# Patient Record
Sex: MALE | Race: WHITE | Employment: OTHER | URBAN - METROPOLITAN AREA
[De-identification: names, ages, dates, MRNs, and addresses within clinical notes are randomized per-mention and may not be internally consistent; named-entity substitution may affect disease eponyms.]

---

## 2013-01-01 LAB — COLONOSCOPY, EXTERNAL: NORMAL

## 2017-03-14 DIAGNOSIS — M25.532 WRIST PAIN, ACUTE, LEFT: ICD-10-CM

## 2017-03-14 DIAGNOSIS — R05.9 COUGH: ICD-10-CM

## 2017-11-01 PROBLEM — E78.1 HYPERTRIGLYCERIDEMIA: Status: ACTIVE | Noted: 2017-11-01

## 2017-11-01 PROBLEM — J33.9 NASAL POLYP: Status: ACTIVE | Noted: 2017-11-01

## 2017-11-01 PROBLEM — J30.9 ALLERGIC RHINITIS: Status: ACTIVE | Noted: 2017-11-01

## 2017-11-01 PROBLEM — G47.00 INSOMNIA: Status: ACTIVE | Noted: 2017-11-01

## 2017-11-01 PROBLEM — R21 RASH: Status: ACTIVE | Noted: 2017-11-01

## 2017-11-01 PROBLEM — M10.9 ACUTE GOUT OF RIGHT FOOT: Status: ACTIVE | Noted: 2017-11-01

## 2017-11-01 PROBLEM — Z12.5 PROSTATE CANCER SCREENING: Status: ACTIVE | Noted: 2017-11-01

## 2017-11-01 PROBLEM — I10 HTN (HYPERTENSION): Status: ACTIVE | Noted: 2017-11-01

## 2017-11-01 PROBLEM — R53.83 FATIGUE: Status: ACTIVE | Noted: 2017-11-01

## 2017-11-01 PROBLEM — M54.9 BACK PAIN: Status: ACTIVE | Noted: 2017-11-01

## 2017-11-01 PROBLEM — M25.532 WRIST PAIN, ACUTE, LEFT: Status: ACTIVE | Noted: 2017-11-01

## 2017-11-01 PROBLEM — R07.9 CHEST PAIN: Status: ACTIVE | Noted: 2017-11-01

## 2017-11-01 PROBLEM — M19.90 ARTHRITIS: Status: ACTIVE | Noted: 2017-11-01

## 2017-11-01 PROBLEM — M25.50 ARTHRALGIA: Status: ACTIVE | Noted: 2017-11-01

## 2017-11-01 PROBLEM — R05.9 COUGH: Status: ACTIVE | Noted: 2017-11-01

## 2017-11-01 RX ORDER — LEVOFLOXACIN 750 MG/1
750 TABLET ORAL DAILY
COMMUNITY
End: 2017-11-27

## 2017-11-27 ENCOUNTER — HOSPITAL ENCOUNTER (OUTPATIENT)
Age: 82
Setting detail: OBSERVATION
Discharge: HOME | End: 2017-11-29
Attending: EMERGENCY MEDICINE | Admitting: INTERNAL MEDICINE
Payer: MEDICARE

## 2017-11-27 ENCOUNTER — APPOINTMENT (OUTPATIENT)
Dept: GENERAL RADIOLOGY | Age: 82
End: 2017-11-27
Attending: EMERGENCY MEDICINE
Payer: MEDICARE

## 2017-11-27 ENCOUNTER — APPOINTMENT (OUTPATIENT)
Dept: CT IMAGING | Age: 82
End: 2017-11-27
Attending: EMERGENCY MEDICINE
Payer: MEDICARE

## 2017-11-27 DIAGNOSIS — R47.1 DYSARTHRIA: Primary | ICD-10-CM

## 2017-11-27 DIAGNOSIS — G45.1 HEMISPHERIC CAROTID ARTERY SYNDROME: ICD-10-CM

## 2017-11-27 DIAGNOSIS — E78.5 HYPERLIPIDEMIA, UNSPECIFIED HYPERLIPIDEMIA TYPE: ICD-10-CM

## 2017-11-27 DIAGNOSIS — G45.9 TRANSIENT CEREBRAL ISCHEMIA, UNSPECIFIED TYPE: ICD-10-CM

## 2017-11-27 DIAGNOSIS — I10 HYPERTENSION, UNSPECIFIED TYPE: ICD-10-CM

## 2017-11-27 DIAGNOSIS — I65.23 BILATERAL CAROTID ARTERY STENOSIS: ICD-10-CM

## 2017-11-27 DIAGNOSIS — I69.322 DYSARTHRIA DUE TO RECENT CEREBROVASCULAR ACCIDENT (CVA): ICD-10-CM

## 2017-11-27 LAB
ALBUMIN SERPL-MCNC: 3.7 G/DL (ref 3.5–5)
ALBUMIN/GLOB SERPL: 0.9 {RATIO} (ref 1.1–2.2)
ALP SERPL-CCNC: 84 U/L (ref 45–117)
ALT SERPL-CCNC: 27 U/L (ref 12–78)
ANION GAP SERPL CALC-SCNC: 6 MMOL/L (ref 5–15)
APPEARANCE UR: CLEAR
AST SERPL-CCNC: 17 U/L (ref 15–37)
ATRIAL RATE: 63 BPM
BACTERIA URNS QL MICRO: NEGATIVE /HPF
BASOPHILS # BLD: 0 K/UL (ref 0–0.1)
BASOPHILS NFR BLD: 1 % (ref 0–1)
BILIRUB SERPL-MCNC: 0.6 MG/DL (ref 0.2–1)
BILIRUB UR QL: NEGATIVE
BUN SERPL-MCNC: 19 MG/DL (ref 6–20)
BUN/CREAT SERPL: 16 (ref 12–20)
CALCIUM SERPL-MCNC: 8.9 MG/DL (ref 8.5–10.1)
CALCULATED P AXIS, ECG09: 28 DEGREES
CALCULATED R AXIS, ECG10: -15 DEGREES
CALCULATED T AXIS, ECG11: 130 DEGREES
CHLORIDE SERPL-SCNC: 102 MMOL/L (ref 97–108)
CK MB CFR SERPL CALC: 1.9 % (ref 0–2.5)
CK MB SERPL-MCNC: 2.2 NG/ML (ref 5–25)
CK SERPL-CCNC: 116 U/L (ref 39–308)
CO2 SERPL-SCNC: 29 MMOL/L (ref 21–32)
COLOR UR: NORMAL
CREAT SERPL-MCNC: 1.17 MG/DL (ref 0.7–1.3)
DIAGNOSIS, 93000: NORMAL
EOSINOPHIL # BLD: 0.3 K/UL (ref 0–0.4)
EOSINOPHIL NFR BLD: 5 % (ref 0–7)
EPITH CASTS URNS QL MICRO: NORMAL /LPF
ERYTHROCYTE [DISTWIDTH] IN BLOOD BY AUTOMATED COUNT: 13.2 % (ref 11.5–14.5)
GLOBULIN SER CALC-MCNC: 4 G/DL (ref 2–4)
GLUCOSE SERPL-MCNC: 106 MG/DL (ref 65–100)
GLUCOSE UR STRIP.AUTO-MCNC: NEGATIVE MG/DL
HCT VFR BLD AUTO: 44 % (ref 36.6–50.3)
HGB BLD-MCNC: 15.3 G/DL (ref 12.1–17)
HGB UR QL STRIP: NEGATIVE
HYALINE CASTS URNS QL MICRO: NORMAL /LPF (ref 0–5)
INR PPP: 1 (ref 0.9–1.1)
KETONES UR QL STRIP.AUTO: NEGATIVE MG/DL
LEUKOCYTE ESTERASE UR QL STRIP.AUTO: NEGATIVE
LYMPHOCYTES # BLD: 1.8 K/UL (ref 0.8–3.5)
LYMPHOCYTES NFR BLD: 29 % (ref 12–49)
MCH RBC QN AUTO: 30.8 PG (ref 26–34)
MCHC RBC AUTO-ENTMCNC: 34.8 G/DL (ref 30–36.5)
MCV RBC AUTO: 88.7 FL (ref 80–99)
MONOCYTES # BLD: 0.4 K/UL (ref 0–1)
MONOCYTES NFR BLD: 6 % (ref 5–13)
NEUTS SEG # BLD: 3.7 K/UL (ref 1.8–8)
NEUTS SEG NFR BLD: 59 % (ref 32–75)
NITRITE UR QL STRIP.AUTO: NEGATIVE
P-R INTERVAL, ECG05: 172 MS
PH UR STRIP: 6 [PH] (ref 5–8)
PLATELET # BLD AUTO: 242 K/UL (ref 150–400)
POTASSIUM SERPL-SCNC: 3.9 MMOL/L (ref 3.5–5.1)
PROT SERPL-MCNC: 7.7 G/DL (ref 6.4–8.2)
PROT UR STRIP-MCNC: NEGATIVE MG/DL
PROTHROMBIN TIME: 10.1 SEC (ref 9–11.1)
Q-T INTERVAL, ECG07: 470 MS
QRS DURATION, ECG06: 154 MS
QTC CALCULATION (BEZET), ECG08: 480 MS
RBC # BLD AUTO: 4.96 M/UL (ref 4.1–5.7)
RBC #/AREA URNS HPF: NORMAL /HPF (ref 0–5)
SODIUM SERPL-SCNC: 137 MMOL/L (ref 136–145)
SP GR UR REFRACTOMETRY: 1.01 (ref 1–1.03)
TROPONIN I SERPL-MCNC: <0.04 NG/ML
UROBILINOGEN UR QL STRIP.AUTO: 0.2 EU/DL (ref 0.2–1)
VENTRICULAR RATE, ECG03: 63 BPM
WBC # BLD AUTO: 6.2 K/UL (ref 4.1–11.1)
WBC URNS QL MICRO: NORMAL /HPF (ref 0–4)

## 2017-11-27 PROCEDURE — 99218 HC RM OBSERVATION: CPT

## 2017-11-27 PROCEDURE — 99285 EMERGENCY DEPT VISIT HI MDM: CPT

## 2017-11-27 PROCEDURE — 81001 URINALYSIS AUTO W/SCOPE: CPT | Performed by: EMERGENCY MEDICINE

## 2017-11-27 PROCEDURE — 93005 ELECTROCARDIOGRAM TRACING: CPT

## 2017-11-27 PROCEDURE — 82550 ASSAY OF CK (CPK): CPT | Performed by: EMERGENCY MEDICINE

## 2017-11-27 PROCEDURE — 96372 THER/PROPH/DIAG INJ SC/IM: CPT

## 2017-11-27 PROCEDURE — 80053 COMPREHEN METABOLIC PANEL: CPT | Performed by: EMERGENCY MEDICINE

## 2017-11-27 PROCEDURE — 71020 XR CHEST PA LAT: CPT

## 2017-11-27 PROCEDURE — 74011250637 HC RX REV CODE- 250/637: Performed by: EMERGENCY MEDICINE

## 2017-11-27 PROCEDURE — 70450 CT HEAD/BRAIN W/O DYE: CPT

## 2017-11-27 PROCEDURE — 85025 COMPLETE CBC W/AUTO DIFF WBC: CPT | Performed by: EMERGENCY MEDICINE

## 2017-11-27 PROCEDURE — 84484 ASSAY OF TROPONIN QUANT: CPT | Performed by: EMERGENCY MEDICINE

## 2017-11-27 PROCEDURE — 74011250636 HC RX REV CODE- 250/636: Performed by: INTERNAL MEDICINE

## 2017-11-27 PROCEDURE — 36415 COLL VENOUS BLD VENIPUNCTURE: CPT | Performed by: EMERGENCY MEDICINE

## 2017-11-27 PROCEDURE — 85610 PROTHROMBIN TIME: CPT | Performed by: EMERGENCY MEDICINE

## 2017-11-27 PROCEDURE — 74011250637 HC RX REV CODE- 250/637: Performed by: INTERNAL MEDICINE

## 2017-11-27 RX ORDER — DOCUSATE SODIUM 100 MG/1
100 CAPSULE, LIQUID FILLED ORAL
Status: DISCONTINUED | OUTPATIENT
Start: 2017-11-27 | End: 2017-11-29 | Stop reason: HOSPADM

## 2017-11-27 RX ORDER — SODIUM CHLORIDE 0.9 % (FLUSH) 0.9 %
5-10 SYRINGE (ML) INJECTION EVERY 8 HOURS
Status: DISCONTINUED | OUTPATIENT
Start: 2017-11-27 | End: 2017-11-29 | Stop reason: HOSPADM

## 2017-11-27 RX ORDER — DIPHENHYDRAMINE HCL 25 MG
25 CAPSULE ORAL
Status: DISCONTINUED | OUTPATIENT
Start: 2017-11-27 | End: 2017-11-29 | Stop reason: HOSPADM

## 2017-11-27 RX ORDER — DIPHENHYDRAMINE HCL 25 MG
25 CAPSULE ORAL
COMMUNITY
End: 2018-06-28

## 2017-11-27 RX ORDER — GUAIFENESIN 100 MG/5ML
243 LIQUID (ML) ORAL
Status: COMPLETED | OUTPATIENT
Start: 2017-11-27 | End: 2017-11-27

## 2017-11-27 RX ORDER — SODIUM CHLORIDE 0.9 % (FLUSH) 0.9 %
5-10 SYRINGE (ML) INJECTION AS NEEDED
Status: DISCONTINUED | OUTPATIENT
Start: 2017-11-27 | End: 2017-11-29 | Stop reason: HOSPADM

## 2017-11-27 RX ORDER — GUAIFENESIN 100 MG/5ML
81 LIQUID (ML) ORAL DAILY
Status: DISCONTINUED | OUTPATIENT
Start: 2017-11-28 | End: 2017-11-29 | Stop reason: HOSPADM

## 2017-11-27 RX ORDER — HYDROCHLOROTHIAZIDE 25 MG/1
25 TABLET ORAL DAILY
Status: DISCONTINUED | OUTPATIENT
Start: 2017-11-28 | End: 2017-11-29 | Stop reason: HOSPADM

## 2017-11-27 RX ORDER — ZOLPIDEM TARTRATE 5 MG/1
5 TABLET ORAL
Status: DISCONTINUED | OUTPATIENT
Start: 2017-11-27 | End: 2017-11-29 | Stop reason: HOSPADM

## 2017-11-27 RX ORDER — ACETAMINOPHEN 325 MG/1
650 TABLET ORAL
Status: DISCONTINUED | OUTPATIENT
Start: 2017-11-27 | End: 2017-11-29 | Stop reason: HOSPADM

## 2017-11-27 RX ORDER — HEPARIN SODIUM 5000 [USP'U]/ML
5000 INJECTION, SOLUTION INTRAVENOUS; SUBCUTANEOUS EVERY 8 HOURS
Status: DISCONTINUED | OUTPATIENT
Start: 2017-11-27 | End: 2017-11-29 | Stop reason: HOSPADM

## 2017-11-27 RX ORDER — ONDANSETRON 2 MG/ML
4 INJECTION INTRAMUSCULAR; INTRAVENOUS
Status: DISCONTINUED | OUTPATIENT
Start: 2017-11-27 | End: 2017-11-29 | Stop reason: HOSPADM

## 2017-11-27 RX ADMIN — ZOLPIDEM TARTRATE 5 MG: 5 TABLET ORAL at 22:15

## 2017-11-27 RX ADMIN — HEPARIN SODIUM 5000 UNITS: 5000 INJECTION, SOLUTION INTRAVENOUS; SUBCUTANEOUS at 22:14

## 2017-11-27 RX ADMIN — ASPIRIN 81 MG 243 MG: 81 TABLET ORAL at 12:08

## 2017-11-27 RX ADMIN — Medication 10 ML: at 22:20

## 2017-11-27 RX ADMIN — Medication 10 ML: at 12:08

## 2017-11-27 RX ADMIN — Medication 10 ML: at 17:00

## 2017-11-27 NOTE — ED PROVIDER NOTES
EMERGENCY DEPARTMENT HISTORY AND PHYSICAL EXAM      Date: 11/27/2017  Patient Name: Monica Bui    History of Presenting Illness     Chief Complaint   Patient presents with    Dysarthria     Pt states he awoke around 8am with slurred speech Pt states he went to bed around 2300 without incident Pt wife states she medicated him with 81 mg ASA prior to coming to ED       History Provided By: Patient    HPI: Monica Bui, 80 y.o. male with PMHx significant for HTN, presents ambulatory to the ED with cc of acute onset mild slurred speech at 0800 this morning. Pt states that he got up this morning without any obvious symptoms when he was picked up by his neighbor as they carpool to [employer] to help put up [employer] lights. He states that while on the way there, he was speaking when his neighbor expressed concern that the pt's speech sounded slurred. Pt denies noticing any slurred speech prior to this, noting he knows what he wants to say but has difficulty getting the words out. He states symptoms have gradually improved and resolved PTA. Symptoms lasted less than 2 hours in duration. He has a hx of Bell's Palsy but denies any hx of CVA/TIA. He reports current use of HCTZ; he denies any current use of anticoagulants. Wife provided the pt with ASA 81 mg PTA. Pt denies any recent falls, injuries, or head traumas. He specially denies any HA, vision changes, cough, or N/V/D. Pt is followed by Dr. Yris Albrecht (cardiology) and has an appointment scheduled for tomorrow morning. PCP: Cecelia Berman MD    There are no other complaints, changes, or physical findings at this time.     Current Facility-Administered Medications   Medication Dose Route Frequency Provider Last Rate Last Dose    sodium chloride (NS) flush 5-10 mL  5-10 mL IntraVENous Q8H Rubye Dragon, DO   10 mL at 11/27/17 1208    sodium chloride (NS) flush 5-10 mL  5-10 mL IntraVENous PRN Rubye Dragon, DO         Current Outpatient Prescriptions   Medication Sig Dispense Refill    diphenhydrAMINE (BENADRYL) 25 mg capsule Take 25 mg by mouth every six (6) hours as needed.  aspirin (ASPIRIN) 325 mg tablet Take 1 tablet by mouth daily. 21 tablet 0    acetaminophen-codeine (TYLENOL-CODEINE #3) 300-30 mg per tablet Take 1 tablet by mouth every four (4) hours as needed for Pain. 40 tablet 0    hydrochlorothiazide (HYDRODIURIL) 25 mg tablet Take 25 mg by mouth daily. Indications: HYPERTENSION         Past History     Past Medical History:  Past Medical History:   Diagnosis Date    Acute gout of right foot 11/1/2017    Allergic rhinitis 11/1/2017    Impression: trial of otc Zyrtec    Arthralgia 11/1/2017    Impression: left hip, possibly referred from spine    Arthritis 11/1/2017    Back pain 11/1/2017    Chest pain 11/1/2017    Impression: left shoulder/arm pain ?anginal equivalent    Cough 11/1/2017    Impression: suspect medication related, will follow    Fatigue 11/1/2017    High cholesterol     HTN (hypertension) 11/1/2017    Impression: stable on HCTZ    Hypertension     intermittent    Hypertriglyceridemia 11/1/2017    Insomnia 11/1/2017    LBBB (left bundle branch block)     Nasal polyp 11/1/2017    Prostate cancer screening 11/1/2017    Rash 11/1/2017    Wrist pain, acute, left 11/1/2017    Impression: mostly resolved, pt concerned this was heart related, more likely musculoskeletal or neuropathic, observe, if increases/persists follow up       Past Surgical History:  Past Surgical History:   Procedure Laterality Date    HX CIRCUMCISION  2013    HX MOHS PROCEDURES Left     NEUROLOGICAL PROCEDURE UNLISTED      \"pinched nerve neck\"       Family History:  History reviewed. No pertinent family history. Social History:  Social History   Substance Use Topics    Smoking status: Former Smoker    Smokeless tobacco: Never Used    Alcohol use Yes      Comment: 1-2 beers per month       Allergies:   Allergies   Allergen Reactions    Hydrocodone Other (comments)     jittery    Lisinopril Cough         Review of Systems   Review of Systems   Constitutional: Negative. Negative for appetite change, chills, fatigue and fever. HENT: Negative. Negative for congestion, rhinorrhea, sinus pressure and sore throat. Eyes: Negative for visual disturbance. Respiratory: Negative. Negative for cough, choking, chest tightness, shortness of breath and wheezing. Cardiovascular: Negative. Negative for chest pain, palpitations and leg swelling. Gastrointestinal: Negative for abdominal pain, constipation, diarrhea, nausea and vomiting. Endocrine: Negative. Genitourinary: Negative. Negative for difficulty urinating, dysuria, flank pain and urgency. Musculoskeletal: Negative. Skin: Negative. Neurological: Positive for speech difficulty (slurred). Negative for dizziness, weakness, light-headedness, numbness and headaches. Psychiatric/Behavioral: Negative. All other systems reviewed and are negative. Physical Exam   Physical Exam   Constitutional: He is oriented to person, place, and time. He appears well-developed and well-nourished. No distress. HENT:   Head: Normocephalic and atraumatic. Mouth/Throat: Oropharynx is clear and moist. No oropharyngeal exudate. Eyes: Conjunctivae and EOM are normal. Pupils are equal, round, and reactive to light. Neck: Normal range of motion. Neck supple. No JVD present. No tracheal deviation present. Cardiovascular: Normal rate, regular rhythm, normal heart sounds and intact distal pulses. No murmur heard. Pulmonary/Chest: Effort normal and breath sounds normal. No stridor. No respiratory distress. He has no wheezes. He has no rales. He exhibits no tenderness. Abdominal: Soft. He exhibits no distension. There is no tenderness. There is no rebound and no guarding. Musculoskeletal: Normal range of motion. He exhibits no edema or tenderness.    Neurological: He is alert and oriented to person, place, and time. No cranial nerve deficit. No focal motor or sensory deficits, no aphasia or slurred speech, there is some mild word finding difficulties   Skin: Skin is warm and dry. He is not diaphoretic. Psychiatric: He has a normal mood and affect. His behavior is normal.   Nursing note and vitals reviewed. Diagnostic Study Results     Labs -  Recent Results (from the past 12 hour(s))   CBC WITH AUTOMATED DIFF    Collection Time: 11/27/17  9:49 AM   Result Value Ref Range    WBC 6.2 4.1 - 11.1 K/uL    RBC 4.96 4.10 - 5.70 M/uL    HGB 15.3 12.1 - 17.0 g/dL    HCT 44.0 36.6 - 50.3 %    MCV 88.7 80.0 - 99.0 FL    MCH 30.8 26.0 - 34.0 PG    MCHC 34.8 30.0 - 36.5 g/dL    RDW 13.2 11.5 - 14.5 %    PLATELET 558 293 - 998 K/uL    NEUTROPHILS 59 32 - 75 %    LYMPHOCYTES 29 12 - 49 %    MONOCYTES 6 5 - 13 %    EOSINOPHILS 5 0 - 7 %    BASOPHILS 1 0 - 1 %    ABS. NEUTROPHILS 3.7 1.8 - 8.0 K/UL    ABS. LYMPHOCYTES 1.8 0.8 - 3.5 K/UL    ABS. MONOCYTES 0.4 0.0 - 1.0 K/UL    ABS. EOSINOPHILS 0.3 0.0 - 0.4 K/UL    ABS. BASOPHILS 0.0 0.0 - 0.1 K/UL   METABOLIC PANEL, COMPREHENSIVE    Collection Time: 11/27/17  9:49 AM   Result Value Ref Range    Sodium 137 136 - 145 mmol/L    Potassium 3.9 3.5 - 5.1 mmol/L    Chloride 102 97 - 108 mmol/L    CO2 29 21 - 32 mmol/L    Anion gap 6 5 - 15 mmol/L    Glucose 106 (H) 65 - 100 mg/dL    BUN 19 6 - 20 MG/DL    Creatinine 1.17 0.70 - 1.30 MG/DL    BUN/Creatinine ratio 16 12 - 20      GFR est AA >60 >60 ml/min/1.73m2    GFR est non-AA 60 (L) >60 ml/min/1.73m2    Calcium 8.9 8.5 - 10.1 MG/DL    Bilirubin, total 0.6 0.2 - 1.0 MG/DL    ALT (SGPT) 27 12 - 78 U/L    AST (SGOT) 17 15 - 37 U/L    Alk.  phosphatase 84 45 - 117 U/L    Protein, total 7.7 6.4 - 8.2 g/dL    Albumin 3.7 3.5 - 5.0 g/dL    Globulin 4.0 2.0 - 4.0 g/dL    A-G Ratio 0.9 (L) 1.1 - 2.2     CK W/ CKMB & INDEX    Collection Time: 11/27/17  9:49 AM   Result Value Ref Range     39 - 308 U/L    CK - MB 2.2 <3.6 NG/ML CK-MB Index 1.9 0 - 2.5     TROPONIN I    Collection Time: 11/27/17  9:49 AM   Result Value Ref Range    Troponin-I, Qt. <0.04 <0.05 ng/mL   EKG, 12 LEAD, INITIAL    Collection Time: 11/27/17 10:36 AM   Result Value Ref Range    Ventricular Rate 63 BPM    Atrial Rate 63 BPM    P-R Interval 172 ms    QRS Duration 154 ms    Q-T Interval 470 ms    QTC Calculation (Bezet) 480 ms    Calculated P Axis 28 degrees    Calculated R Axis -15 degrees    Calculated T Axis 130 degrees    Diagnosis       Normal sinus rhythm  Left bundle branch block  Abnormal ECG  No previous ECGs available     PROTHROMBIN TIME + INR    Collection Time: 11/27/17 11:00 AM   Result Value Ref Range    INR 1.0 0.9 - 1.1      Prothrombin time 10.1 9.0 - 11.1 sec   URINALYSIS W/MICROSCOPIC    Collection Time: 11/27/17 11:00 AM   Result Value Ref Range    Color YELLOW/STRAW      Appearance CLEAR CLEAR      Specific gravity 1.013 1.003 - 1.030      pH (UA) 6.0 5.0 - 8.0      Protein NEGATIVE  NEG mg/dL    Glucose NEGATIVE  NEG mg/dL    Ketone NEGATIVE  NEG mg/dL    Bilirubin NEGATIVE  NEG      Blood NEGATIVE  NEG      Urobilinogen 0.2 0.2 - 1.0 EU/dL    Nitrites NEGATIVE  NEG      Leukocyte Esterase NEGATIVE  NEG      WBC 0-4 0 - 4 /hpf    RBC 0-5 0 - 5 /hpf    Epithelial cells FEW FEW /lpf    Bacteria NEGATIVE  NEG /hpf    Hyaline cast 0-2 0 - 5 /lpf       Radiologic Studies -   CT Results  (Last 48 hours)               11/27/17 1012  CT HEAD WO CONT Final result    Impression:   impression: No acute changes. Narrative:  EXAM:  CT HEAD WO CONT       INDICATION:   dysarthria       COMPARISON: None. CONTRAST:  None. TECHNIQUE: Unenhanced CT of the head was performed using 5 mm images. Brain and   bone windows were generated. CT dose reduction was achieved through use of a   standardized protocol tailored for this examination and automatic exposure   control for dose modulation.          FINDINGS:   There is no extra-axial fluid collection hemorrhage shift or masses . CXR Results  (Last 48 hours)               11/27/17 1134  XR CHEST PA LAT Final result    Impression:  Impression: No acute process or change compared to the prior exam.           Narrative:  Exam:  2 view chest       Indication: Dysarthria       Comparison to 3/24/2006. PA and lateral views demonstrate normal heart size. There is no acute process in   the lung fields. Degenerative changes are noted in the thoracic spine. The lungs   are hyperinflated. Medical Decision Making   I am the first provider for this patient. I reviewed the vital signs, available nursing notes, past medical history, past surgical history, family history and social history. Vital Signs-Reviewed the patient's vital signs. Patient Vitals for the past 12 hrs:   Temp Pulse Resp BP SpO2   11/27/17 1215 - 65 20 130/73 96 %   11/27/17 1200 - 63 15 128/67 96 %   11/27/17 1145 - 63 9 120/65 99 %   11/27/17 1133 - 67 17 - 97 %   11/27/17 1132 - 72 15 - 96 %   11/27/17 1130 - - - 145/68 -   11/27/17 1025 - 74 18 - 94 %   11/27/17 0939 - 78 20 - 97 %   11/27/17 0930 - 78 15 161/74 97 %   11/27/17 0927 98 °F (36.7 °C) 82 16 160/84 96 %       Pulse Oximetry Analysis - 97% on RA    Cardiac Monitor:   Rate: 75 bpm  Rhythm: Normal Sinus Rhythm     Records Reviewed: Nursing Notes, Old Medical Records and Previous electrocardiograms    Provider Notes (Medical Decision Making):   DDx: TIA, CVA, ICH, electrolyte abnormality    EKG:  NSR, LBBB, normal axis/pr, prolonged qrs, no acute ST-T wave changes, Mason Nahomy,     ED Course:   Initial assessment performed. The patients presenting problems have been discussed, and they are in agreement with the care plan formulated and outlined with them. I have encouraged them to ask questions as they arise throughout their visit. 11:32 AM  I have just reevaluated the patient.  I have reviewed his vital signs and determined there is currently no worsening in their condition or physical exam. Results have been reviewed with them and their questions have been answered. We will continue to review further results as they come available. CONSULT NOTE:   12:14 PM  Madhavi Mascorro DO spoke with Dr. Solomon Morgan,   Specialty: Hospitalist  Discussed pt's hx, disposition, and available diagnostic and imaging results. Reviewed care plans. Consultant will evaluate pt for admission. Disposition:  Admit Note:  12:14 PM  Patient is being admitted to the hospital by Dr. Solomon Morgan. The results of their tests and reasons for their admission have been discussed with them and/or available family. They convey agreement and understanding for the need to be admitted and for their admission diagnosis. Consultation has been made with the inpatient physician specialist for hospitalization. At the time of admission, pt's symptoms resolved, will continue to recommend admission for TIA work-up. PLAN:  1. Admit to Hospitalist     Diagnosis     Clinical Impression:   1. Dysarthria        Attestations: This note is prepared by Gordo Hickey, acting as Scribe for Madhavi Mascorro, [address] [place] [address]: The scribe's documentation has been prepared under my direction and personally reviewed by me in its entirety.  I confirm that the note above accurately reflects all work, treatment, procedures, and medical decision making performed by me.    s

## 2017-11-27 NOTE — IP AVS SNAPSHOT
[address] [facility] 
[phone] Patient: Ngoc Avilez MRN: [id number] SRW:[birth year] About your hospitalization You were admitted on:  November 27, 2017 You last received care in the:  [facility] 3 NEUROSCIENCE TELEMETRY You were discharged on:  November 29, 2017 Why you were hospitalized Your primary diagnosis was:  Not on File Your diagnoses also included:  Tia (Transient Ischemic Attack), Bilateral Carotid Artery Stenosis, Dysarthria Due To Recent Cerebrovascular Accident (Cva) Things You Need To Do (next 8 weeks) Follow up with Farheen Joaquin MD  
  
Phone:  [phone] Where:  [address] Friday Dec 08, 2017 Follow Up with Farheen Joaquin MD at 10:20 AM  
Where:  [facility] [address] ([facility]) Follow up with 10:20 Wednesday Dec 20, 2017 Follow Up with Tommy Can MD at  1:20 PM  
Where:  [facility]) Discharge Orders None A check fritz indicates which time of day the medication should be taken. My Medications TAKE these medications as instructed Instructions Each Dose to Equal  
 Morning Noon Evening Bedtime  
 aspirin 81 mg chewable tablet Your last dose was: Your next dose is: Take 1 Tab by mouth daily. 81 mg  
    
   
   
   
  
 atorvastatin 40 mg tablet Commonly known as:  LIPITOR Your last dose was: Your next dose is: Take 1 Tab by mouth nightly. 40 mg  
    
   
   
   
  
 BENADRYL 25 mg capsule Generic drug:  diphenhydrAMINE Your last dose was: Your next dose is: Take 25 mg by mouth every six (6) hours as needed for Itching or Skin Irritation.   
 25 mg  
    
   
 hydroCHLOROthiazide 25 mg tablet Commonly known as:  HYDRODIURIL Your last dose was: Your next dose is: Take 25 mg by mouth daily. Indications: HYPERTENSION  
 25 mg Where to Get Your Medications These medications were sent to [facility] [address], [address] [address] Phone:  [phone]  
  aspirin 81 mg chewable tablet  
 atorvastatin 40 mg tablet Discharge Instructions [address] [address] 
[phone] Patient Discharge Instructions Alan Almaguer / [id number] : 1935 Admitted 2017 Discharged: 17 Take Home Medications · It is important that you take the medication exactly as they are prescribed. · Keep your medication in the bottles provided by the pharmacist and keep a list of the medication names, dosages, and times to be taken in your wallet. · Do not take other medications without consulting your doctor. What to do at [facility] Recommended diet: Cardiac Diet, Recommended activity: Activity as tolerated, Follow-up with Dr. Nicolasa Amin in 1 week. Call [phone] for your appointment. Information obtained by : 
I understand that if any problems occur once I am at home I am to contact my physician. I understand and acknowledge receipt of the instructions indicated above. Physician's or R.N.'s Signature                                                                  Date/Time Patient or Representative Signature Date/Time [employer] Announcement We are excited to announce that we are making your provider's discharge notes available to you in [employer]. You will see these notes when they are completed and signed by the physician that discharged you from your recent hospital stay. If you have any questions or concerns about any information you see in [employer], please call the Health Information Department where you were seen or reach out to your Primary Care Provider for more information about your plan of care. Introducing [facility]! Serene Tinoco introduces [employer] patient portal. Now you can access parts of your medical record, email your doctor's office, and request medication refills online. 1. In your internet browser, go to [email or web address] 2. Click on the First Time User? Click Here link in the Sign In box. You will see the New Member Sign Up page. 3. Enter your [employer] Access Code exactly as it appears below. You will not need to use this code after youve completed the sign-up process. If you do not sign up before the expiration date, you must request a new code. · [employer] Access Code: [email or web address] Expires: 2/25/2018  9:57 AM 
 
4. Enter the last four digits of your Social Security Number (xxxx) and Date of Birth (mm/dd/yyyy) as indicated and click Submit. You will be taken to the next sign-up page. 5. Create a [employer] ID. This will be your [employer] login ID and cannot be changed, so think of one that is secure and easy to remember. 6. Create a [employer] password. You can change your password at any time. 7. Enter your Password Reset Question and Answer. This can be used at a later time if you forget your password. 8. Enter your e-mail address. You will receive e-mail notification when new information is available in [address] 9. Click Sign Up. You can now view and download portions of your medical record. 10. Click the Download Summary menu link to download a portable copy of your medical information. If you have questions, please visit the Frequently Asked Questions section of the [employer] website. Remember, [employer] is NOT to be used for urgent needs. For medical emergencies, dial 911. Now available from your iPhone and Android! Providers Seen During Your Hospitalization Provider Specialty Primary office phone Ivy Orozco DO Emergency Medicine [phone] Radha Quinones MD Internal Medicine [phone] Your Primary Care Physician (PCP) Primary Care Physician Office Phone Office Fax [facility]  You are allergic to the following Allergen Reactions Hydrocodone Other (comments)  
 jittery Lisinopril Cough Recent Documentation Height Weight BMI Smoking Status 1.753 m 88.4 kg 28.78 kg/m2 Former Smoker Emergency Contacts Name Discharge Info Relation Home Work Mobile Bree Rogernda DISCHARGE CAREGIVER [3] Spouse [3] [phone] Patient Belongings The following personal items are in your possession at time of discharge: 
  Dental Appliances: None  Visual Aid: None      Home Medications: None   Jewelry: None  Clothing: Shirt, Pants, Undergarments    Other Valuables: None Please provide this summary of care documentation to your next provider. Signatures-by signing, you are acknowledging that this After Visit Summary has been reviewed with you and you have received a copy. Patient Signature:  ____________________________________________________________ Date:  ____________________________________________________________  
  
Sarah Beth Sarabia Provider Signature:  ____________________________________________________________ Date:  ____________________________________________________________

## 2017-11-27 NOTE — ED NOTES
Pt sitting in bed. No slurring noted at time. Pt feels slowly he is getting better as far as finding his words. Denies headache or chest pain or SOB.

## 2017-11-27 NOTE — PROGRESS NOTES
Pharmacy Clarification of Prior to Admission Medication Regimen     The patient was interviewed regarding clarification of the prior to admission medication regimen. Wife was present in room and obtained permission from patient to discuss drug regimen with visitor(s) present. Patient was questioned regarding use of any other inhalers, topical products, over the counter medications, herbal medications, vitamin products or ophthalmic/nasal/otic medication use. Information Obtained From: Patient    Pertinent Pharmacy Findings:   Updated patients preferred outpatient pharmacy to: [facility] [address] [place] [address], [address] [place] [address] RD. PTA medication list was corrected to the following:     Prior to Admission Medications   Prescriptions Last Dose Informant Patient Reported? Taking? diphenhydrAMINE (BENADRYL) 25 mg capsule 11/27/2017 at Unknown time Self Yes Yes   Sig: Take 25 mg by mouth every six (6) hours as needed for Itching or Skin Irritation. hydrochlorothiazide (HYDRODIURIL) 25 mg tablet 11/27/2017 at Unknown time Self Yes Yes   Sig: Take 25 mg by mouth daily.  Indications: HYPERTENSION      Facility-Administered Medications: None          Thank you,  Aaliyah Mullen CPhT  Medication History Pharmacy Technician

## 2017-11-27 NOTE — Clinical Note
Patient Class[de-identified] Observation [641] Type of Bed: Telemetry [19] Reason for Observation: TIA Admitting Diagnosis: TIA (transient ischemic attack) [550946] Admitting Physician: Tobin Osgood Attending Physician: Peter Che [[id number]]

## 2017-11-27 NOTE — ED NOTES
TRANSFER - OUT REPORT:    Verbal report given to T RN (name) on Matthew Rashid  being transferred to [facility](unit) for routine progression of care       Report consisted of patients Situation, Background, Assessment and   Recommendations(SBAR). Information from the following report(s) SBAR, Kardex, ED Summary and MAR was reviewed with the receiving nurse. Lines:   Peripheral IV 11/27/17 Left Antecubital (Active)   Site Assessment Clean, dry, & intact 11/27/2017  9:52 AM   Phlebitis Assessment 0 11/27/2017  9:52 AM   Infiltration Assessment 0 11/27/2017  9:52 AM   Dressing Status Clean, dry, & intact 11/27/2017  9:52 AM   Dressing Type Tape;Transparent 11/27/2017  9:52 AM   Hub Color/Line Status Pink;Flushed 11/27/2017  9:52 AM   Action Taken Blood drawn 11/27/2017  9:52 AM        Opportunity for questions and clarification was provided.       Patient transported with:   Belongings and wife

## 2017-11-27 NOTE — ED NOTES
Labs drawn and sent. Pt resting with wife at bedside. Remains A&O, good movement. Speech remains slurred at times with difficulty with word retrieval noted.

## 2017-11-27 NOTE — ED NOTES
Assumed care of pt from [facility]. Pt reports this morning he felt fine, on his way to work with a friend, friend noted pt had difficulty getting words out, pt reports he knew what he was trying to say but had trouble getting words out. No slurred speech noted however. Pt denies blurred vision, CP, or SOB. Pt generally healthy, active.

## 2017-11-27 NOTE — H&P
Hospitalist Admission Note    NAME: Spencer Rider   :  1935   MRN:  [id number]     Date/Time:  2017 1:31 PM    Patient PCP: Marii Ayoub MD  ________________________________________________________________________    My assessment of this patient's clinical condition and my plan of care is as follows. Assessment / Plan:  Suspected TIA  -Had dysarthria with no other symptoms. Dysarthria is almost resolved. -neuro exam is completely normal.  -start TIA work up with A1c, TSH and lipid panel. Start asa. Check us carotid and 2D echocardiogram. Consult neurology. Telemetry monitoring. EKG shows LBBB which is old.  -continue neuro checks. HTN  -resume pta hctz        Code Status: Full   Surrogate Decision Maker: Wife Severa Pinks    DVT Prophylaxis: Heparin  GI Prophylaxis: not indicated    Baseline: Independent        Subjective:   CHIEF COMPLAINT: Dysarthria    HISTORY OF PRESENT ILLNESS:     Spencer Rider is a 80 y.o.   male with HTN  who presents with sudden onset of dysarthria since this am.  He reports being in his usual state of health until early this am when he woke up and started to have  Sudden onset of word finding difficulty with no other problems. He noticed difficulty articulating words  And also find words. He denied having any weakness, tingling or numbness, facial deviation. He denies  Having any loss of consciousness. He denied similar episodes in the past. He deneis chest pain, sob,  Palpitations. 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 His symptoms have  Lasted for about 2 hours and since arrival to ER his symptoms are getting better. On arrival to er, he had a ct brain which was normal. Vitals and lab work were also normal.     We were asked to admit for work up and evaluation of the above problems.      Past Medical History:   Diagnosis Date    Acute gout of right foot 2017    Allergic rhinitis 2017    Impression: trial of otc Zyrtec    Arthralgia 11/1/2017    Impression: left hip, possibly referred from spine    Arthritis 11/1/2017    Back pain 11/1/2017    Chest pain 11/1/2017    Impression: left shoulder/arm pain ?anginal equivalent    Cough 11/1/2017    Impression: suspect medication related, will follow    Fatigue 11/1/2017    High cholesterol     HTN (hypertension) 11/1/2017    Impression: stable on HCTZ    Hypertension     intermittent    Hypertriglyceridemia 11/1/2017    Insomnia 11/1/2017    LBBB (left bundle branch block)     Nasal polyp 11/1/2017    Prostate cancer screening 11/1/2017    Rash 11/1/2017    Wrist pain, acute, left 11/1/2017    Impression: mostly resolved, pt concerned this was heart related, more likely musculoskeletal or neuropathic, observe, if increases/persists follow up        Past Surgical History:   Procedure Laterality Date    HX CIRCUMCISION  2013    HX MOHS PROCEDURES Left     NEUROLOGICAL PROCEDURE UNLISTED      \"pinched nerve neck\"       Social History   Substance Use Topics    Smoking status: Former Smoker    Smokeless tobacco: Never Used    Alcohol use Yes      Comment: 1-2 beers per month        History reviewed. No pertinent family history. No CAD in the family. Allergies   Allergen Reactions    Hydrocodone Other (comments)     jittery    Lisinopril Cough        Prior to Admission medications    Medication Sig Start Date End Date Taking? Authorizing Provider   diphenhydrAMINE (BENADRYL) 25 mg capsule Take 25 mg by mouth every six (6) hours as needed for Itching or Skin Irritation. Yes Phys Other, MD   hydrochlorothiazide (HYDRODIURIL) 25 mg tablet Take 25 mg by mouth daily. Indications: HYPERTENSION   Yes Historical Provider       REVIEW OF SYSTEMS:     I am not able to complete the review of systems because:    The patient is intubated and sedated    The patient has altered mental status due to his acute medical problems    The patient has baseline aphasia from prior stroke(s)    The patient has baseline dementia and is not reliable historian    The patient is in acute medical distress and unable to provide information           Total of 12 systems reviewed as follows:       POSITIVE= underlined text  Negative = text not underlined  General:  fever, chills, sweats, generalized weakness, weight loss/gain,      loss of appetite   Eyes:    blurred vision, eye pain, loss of vision, double vision  ENT:    rhinorrhea, pharyngitis   Respiratory:   cough, sputum production, SOB, MCCOY, wheezing, pleuritic pain   Cardiology:   chest pain, palpitations, orthopnea, PND, edema, syncope   Gastrointestinal:  abdominal pain , N/V, diarrhea, dysphagia, constipation, bleeding   Genitourinary:  frequency, urgency, dysuria, hematuria, incontinence   Muskuloskeletal :  arthralgia, myalgia, back pain  Hematology:  easy bruising, nose or gum bleeding, lymphadenopathy   Dermatological: rash, ulceration, pruritis, color change / jaundice  Endocrine:   hot flashes or polydipsia   Neurological:  headache, dizziness, confusion, focal weakness, paresthesia,     Speech difficulties, memory loss, gait difficulty  Psychological: Feelings of anxiety, depression, agitation    Objective:   VITALS:    Visit Vitals    /74    Pulse 90    Temp 98 °F (36.7 °C)    Resp 25    Ht 5' 9\" (1.753 m)    Wt 88.4 kg (194 lb 14.2 oz)    SpO2 90%    BMI 28.78 kg/m2       PHYSICAL EXAM:    General:    Alert, cooperative, no distress, appears stated age. HEENT: Atraumatic, anicteric sclerae, pink conjunctivae     No oral ulcers, mucosa moist, throat clear, dentition fair  Neck:  Supple, symmetrical,  thyroid: non tender  Lungs:   Clear to auscultation bilaterally. No Wheezing or Rhonchi. No rales. Chest wall:  No tenderness  No Accessory muscle use. Heart:   Regular  rhythm,  No  murmur   No edema  Abdomen:   Soft, non-tender. Not distended. Bowel sounds normal  Extremities: No cyanosis.   No clubbing,      Skin turgor normal, Capillary refill normal, Radial dial pulse 2+  Skin:     Not pale. Not Jaundiced  No rashes   Psych:  Good insight. Not depressed. Not anxious or agitated. Neurologic: EOMs intact. No facial asymmetry. No aphasia or slurred speech. Symmetrical strength, Sensation grossly intact. Alert and oriented X 4.     _______________________________________________________________________  Care Plan discussed with:    Comments   Patient y    Family      RN y    Care Manager                    Consultant:      _______________________________________________________________________  Expected  Disposition:   Home with Family y   HH/PT/OT/RN    SNF/LTC    EVAN    ________________________________________________________________________  TOTAL TIME:  48  Minutes    Critical Care Provided     Minutes non procedure based      Comments    y Reviewed previous records   >50% of visit spent in counseling and coordination of care y Discussion with patient and/or family and questions answered       ________________________________________________________________________  Signed: Silvana Amaya MD    Procedures: see electronic medical records for all procedures/Xrays and details which were not copied into this note but were reviewed prior to creation of Plan. LAB DATA REVIEWED:    Recent Results (from the past 24 hour(s))   CBC WITH AUTOMATED DIFF    Collection Time: 11/27/17  9:49 AM   Result Value Ref Range    WBC 6.2 4.1 - 11.1 K/uL    RBC 4.96 4.10 - 5.70 M/uL    HGB 15.3 12.1 - 17.0 g/dL    HCT 44.0 36.6 - 50.3 %    MCV 88.7 80.0 - 99.0 FL    MCH 30.8 26.0 - 34.0 PG    MCHC 34.8 30.0 - 36.5 g/dL    RDW 13.2 11.5 - 14.5 %    PLATELET 676 801 - 689 K/uL    NEUTROPHILS 59 32 - 75 %    LYMPHOCYTES 29 12 - 49 %    MONOCYTES 6 5 - 13 %    EOSINOPHILS 5 0 - 7 %    BASOPHILS 1 0 - 1 %    ABS. NEUTROPHILS 3.7 1.8 - 8.0 K/UL    ABS. LYMPHOCYTES 1.8 0.8 - 3.5 K/UL    ABS.  MONOCYTES 0.4 0.0 - 1.0 K/UL ABS. EOSINOPHILS 0.3 0.0 - 0.4 K/UL    ABS. BASOPHILS 0.0 0.0 - 0.1 K/UL   METABOLIC PANEL, COMPREHENSIVE    Collection Time: 11/27/17  9:49 AM   Result Value Ref Range    Sodium 137 136 - 145 mmol/L    Potassium 3.9 3.5 - 5.1 mmol/L    Chloride 102 97 - 108 mmol/L    CO2 29 21 - 32 mmol/L    Anion gap 6 5 - 15 mmol/L    Glucose 106 (H) 65 - 100 mg/dL    BUN 19 6 - 20 MG/DL    Creatinine 1.17 0.70 - 1.30 MG/DL    BUN/Creatinine ratio 16 12 - 20      GFR est AA >60 >60 ml/min/1.73m2    GFR est non-AA 60 (L) >60 ml/min/1.73m2    Calcium 8.9 8.5 - 10.1 MG/DL    Bilirubin, total 0.6 0.2 - 1.0 MG/DL    ALT (SGPT) 27 12 - 78 U/L    AST (SGOT) 17 15 - 37 U/L    Alk.  phosphatase 84 45 - 117 U/L    Protein, total 7.7 6.4 - 8.2 g/dL    Albumin 3.7 3.5 - 5.0 g/dL    Globulin 4.0 2.0 - 4.0 g/dL    A-G Ratio 0.9 (L) 1.1 - 2.2     CK W/ CKMB & INDEX    Collection Time: 11/27/17  9:49 AM   Result Value Ref Range     39 - 308 U/L    CK - MB 2.2 <3.6 NG/ML    CK-MB Index 1.9 0 - 2.5     TROPONIN I    Collection Time: 11/27/17  9:49 AM   Result Value Ref Range    Troponin-I, Qt. <0.04 <0.05 ng/mL   EKG, 12 LEAD, INITIAL    Collection Time: 11/27/17 10:36 AM   Result Value Ref Range    Ventricular Rate 63 BPM    Atrial Rate 63 BPM    P-R Interval 172 ms    QRS Duration 154 ms    Q-T Interval 470 ms    QTC Calculation (Bezet) 480 ms    Calculated P Axis 28 degrees    Calculated R Axis -15 degrees    Calculated T Axis 130 degrees    Diagnosis       Normal sinus rhythm  Left bundle branch block  Abnormal ECG  No previous ECGs available     PROTHROMBIN TIME + INR    Collection Time: 11/27/17 11:00 AM   Result Value Ref Range    INR 1.0 0.9 - 1.1      Prothrombin time 10.1 9.0 - 11.1 sec   URINALYSIS W/MICROSCOPIC    Collection Time: 11/27/17 11:00 AM   Result Value Ref Range    Color YELLOW/STRAW      Appearance CLEAR CLEAR      Specific gravity 1.013 1.003 - 1.030      pH (UA) 6.0 5.0 - 8.0      Protein NEGATIVE  NEG mg/dL Glucose NEGATIVE  NEG mg/dL    Ketone NEGATIVE  NEG mg/dL    Bilirubin NEGATIVE  NEG      Blood NEGATIVE  NEG      Urobilinogen 0.2 0.2 - 1.0 EU/dL    Nitrites NEGATIVE  NEG      Leukocyte Esterase NEGATIVE  NEG      WBC 0-4 0 - 4 /hpf    RBC 0-5 0 - 5 /hpf    Epithelial cells FEW FEW /lpf    Bacteria NEGATIVE  NEG /hpf    Hyaline cast 0-2 0 - 5 /lpf

## 2017-11-27 NOTE — PROGRESS NOTES
TRANSFER - IN REPORT:    Verbal report received from [address], RN (name) on Delores Chaidez  being received from ED (unit) for routine progression of care      Report consisted of patients Situation, Background, Assessment and   Recommendations(SBAR). Information from the following report(s) SBAR, Kardex, Intake/Output, Recent Results and Cardiac Rhythm NSR was reviewed with the receiving nurse. Opportunity for questions and clarification was provided. Assessment completed upon patients arrival to unit and care assumed.

## 2017-11-28 PROBLEM — I69.322 DYSARTHRIA DUE TO RECENT CEREBROVASCULAR ACCIDENT (CVA): Status: ACTIVE | Noted: 2017-11-28

## 2017-11-28 PROBLEM — I65.23 BILATERAL CAROTID ARTERY STENOSIS: Status: ACTIVE | Noted: 2017-11-28

## 2017-11-28 LAB
ANION GAP SERPL CALC-SCNC: 6 MMOL/L (ref 5–15)
BASOPHILS # BLD: 0 K/UL (ref 0–0.1)
BASOPHILS NFR BLD: 0 % (ref 0–1)
BUN SERPL-MCNC: 18 MG/DL (ref 6–20)
BUN/CREAT SERPL: 16 (ref 12–20)
CALCIUM SERPL-MCNC: 8.7 MG/DL (ref 8.5–10.1)
CHLORIDE SERPL-SCNC: 102 MMOL/L (ref 97–108)
CHOLEST SERPL-MCNC: 216 MG/DL
CO2 SERPL-SCNC: 29 MMOL/L (ref 21–32)
CREAT SERPL-MCNC: 1.16 MG/DL (ref 0.7–1.3)
EOSINOPHIL # BLD: 0.4 K/UL (ref 0–0.4)
EOSINOPHIL NFR BLD: 6 % (ref 0–7)
ERYTHROCYTE [DISTWIDTH] IN BLOOD BY AUTOMATED COUNT: 13.3 % (ref 11.5–14.5)
EST. AVERAGE GLUCOSE BLD GHB EST-MCNC: 114 MG/DL
GLUCOSE SERPL-MCNC: 105 MG/DL (ref 65–100)
HBA1C MFR BLD: 5.6 % (ref 4.2–6.3)
HCT VFR BLD AUTO: 45.8 % (ref 36.6–50.3)
HDLC SERPL-MCNC: 40 MG/DL
HDLC SERPL: 5.4 {RATIO} (ref 0–5)
HGB BLD-MCNC: 16 G/DL (ref 12.1–17)
LDLC SERPL CALC-MCNC: 139.4 MG/DL (ref 0–100)
LIPID PROFILE,FLP: ABNORMAL
LYMPHOCYTES # BLD: 2 K/UL (ref 0.8–3.5)
LYMPHOCYTES NFR BLD: 31 % (ref 12–49)
MCH RBC QN AUTO: 31.2 PG (ref 26–34)
MCHC RBC AUTO-ENTMCNC: 34.9 G/DL (ref 30–36.5)
MCV RBC AUTO: 89.3 FL (ref 80–99)
MONOCYTES # BLD: 0.4 K/UL (ref 0–1)
MONOCYTES NFR BLD: 6 % (ref 5–13)
NEUTS SEG # BLD: 3.6 K/UL (ref 1.8–8)
NEUTS SEG NFR BLD: 57 % (ref 32–75)
PLATELET # BLD AUTO: 264 K/UL (ref 150–400)
POTASSIUM SERPL-SCNC: 4 MMOL/L (ref 3.5–5.1)
RBC # BLD AUTO: 5.13 M/UL (ref 4.1–5.7)
SODIUM SERPL-SCNC: 137 MMOL/L (ref 136–145)
TRIGL SERPL-MCNC: 183 MG/DL (ref ?–150)
TSH SERPL DL<=0.05 MIU/L-ACNC: 1.66 UIU/ML (ref 0.36–3.74)
VLDLC SERPL CALC-MCNC: 36.6 MG/DL
WBC # BLD AUTO: 6.3 K/UL (ref 4.1–11.1)

## 2017-11-28 PROCEDURE — G9186 MOTOR SPEECH GOAL STATUS: HCPCS

## 2017-11-28 PROCEDURE — G9158 MOTOR SPEECH D/C STATUS: HCPCS

## 2017-11-28 PROCEDURE — 97161 PT EVAL LOW COMPLEX 20 MIN: CPT | Performed by: PHYSICAL THERAPIST

## 2017-11-28 PROCEDURE — 93306 TTE W/DOPPLER COMPLETE: CPT

## 2017-11-28 PROCEDURE — 85025 COMPLETE CBC W/AUTO DIFF WBC: CPT | Performed by: INTERNAL MEDICINE

## 2017-11-28 PROCEDURE — 80048 BASIC METABOLIC PNL TOTAL CA: CPT | Performed by: INTERNAL MEDICINE

## 2017-11-28 PROCEDURE — G8999 MOTOR SPEECH CURRENT STATUS: HCPCS

## 2017-11-28 PROCEDURE — 80061 LIPID PANEL: CPT | Performed by: INTERNAL MEDICINE

## 2017-11-28 PROCEDURE — 74011250637 HC RX REV CODE- 250/637: Performed by: INTERNAL MEDICINE

## 2017-11-28 PROCEDURE — 99218 HC RM OBSERVATION: CPT

## 2017-11-28 PROCEDURE — 84443 ASSAY THYROID STIM HORMONE: CPT | Performed by: INTERNAL MEDICINE

## 2017-11-28 PROCEDURE — 36415 COLL VENOUS BLD VENIPUNCTURE: CPT | Performed by: INTERNAL MEDICINE

## 2017-11-28 PROCEDURE — G8979 MOBILITY GOAL STATUS: HCPCS | Performed by: PHYSICAL THERAPIST

## 2017-11-28 PROCEDURE — 74011250636 HC RX REV CODE- 250/636: Performed by: INTERNAL MEDICINE

## 2017-11-28 PROCEDURE — G8980 MOBILITY D/C STATUS: HCPCS | Performed by: PHYSICAL THERAPIST

## 2017-11-28 PROCEDURE — 92523 SPEECH SOUND LANG COMPREHEN: CPT

## 2017-11-28 PROCEDURE — 83036 HEMOGLOBIN GLYCOSYLATED A1C: CPT | Performed by: INTERNAL MEDICINE

## 2017-11-28 PROCEDURE — 93880 EXTRACRANIAL BILAT STUDY: CPT

## 2017-11-28 PROCEDURE — G8978 MOBILITY CURRENT STATUS: HCPCS | Performed by: PHYSICAL THERAPIST

## 2017-11-28 PROCEDURE — 96372 THER/PROPH/DIAG INJ SC/IM: CPT

## 2017-11-28 PROCEDURE — 74011250637 HC RX REV CODE- 250/637: Performed by: PSYCHIATRY & NEUROLOGY

## 2017-11-28 RX ORDER — ATORVASTATIN CALCIUM 10 MG/1
10 TABLET, FILM COATED ORAL
Status: DISCONTINUED | OUTPATIENT
Start: 2017-11-28 | End: 2017-11-28

## 2017-11-28 RX ORDER — ATORVASTATIN CALCIUM 40 MG/1
40 TABLET, FILM COATED ORAL
Status: DISCONTINUED | OUTPATIENT
Start: 2017-11-28 | End: 2017-11-29 | Stop reason: HOSPADM

## 2017-11-28 RX ADMIN — Medication 10 ML: at 05:20

## 2017-11-28 RX ADMIN — ASPIRIN 81 MG CHEWABLE TABLET 81 MG: 81 TABLET CHEWABLE at 09:00

## 2017-11-28 RX ADMIN — ZOLPIDEM TARTRATE 5 MG: 5 TABLET ORAL at 21:38

## 2017-11-28 RX ADMIN — HEPARIN SODIUM 5000 UNITS: 5000 INJECTION, SOLUTION INTRAVENOUS; SUBCUTANEOUS at 05:16

## 2017-11-28 RX ADMIN — ATORVASTATIN CALCIUM 40 MG: 40 TABLET, FILM COATED ORAL at 21:38

## 2017-11-28 RX ADMIN — HYDROCHLOROTHIAZIDE 25 MG: 25 TABLET ORAL at 09:00

## 2017-11-28 RX ADMIN — Medication 10 ML: at 21:39

## 2017-11-28 NOTE — PROGRESS NOTES
PROGRESS NOTE    NAME:  Ariela Dasilva   :   1935   MRN:   [id number]     Date/Time:  2017 1:31 PM  Subjective:   History:  Pt. Denies current symptoms. Admitted with dysarthria lasting a couple of hours. No weakness. Medications reviewed:  Current Facility-Administered Medications   Medication Dose Route Frequency    sodium chloride (NS) flush 5-10 mL  5-10 mL IntraVENous Q8H    sodium chloride (NS) flush 5-10 mL  5-10 mL IntraVENous PRN    diphenhydrAMINE (BENADRYL) capsule 25 mg  25 mg Oral Q6H PRN    hydroCHLOROthiazide (HYDRODIURIL) tablet 25 mg  25 mg Oral DAILY    sodium chloride (NS) flush 5-10 mL  5-10 mL IntraVENous Q8H    sodium chloride (NS) flush 5-10 mL  5-10 mL IntraVENous PRN    acetaminophen (TYLENOL) tablet 650 mg  650 mg Oral Q6H PRN    ondansetron (ZOFRAN) injection 4 mg  4 mg IntraVENous Q6H PRN    docusate sodium (COLACE) capsule 100 mg  100 mg Oral DAILY PRN    heparin (porcine) injection 5,000 Units  5,000 Units SubCUTAneous Q8H    aspirin chewable tablet 81 mg  81 mg Oral DAILY    zolpidem (AMBIEN) tablet 5 mg  5 mg Oral QHS PRN        Objective:   Vitals:  Visit Vitals    /82 (BP 1 Location: Right arm, BP Patient Position: Sitting)    Pulse 73    Temp 97.5 °F (36.4 °C)    Resp 18    Ht 5' 9\" (1.753 m)    Wt 194 lb 14.2 oz (88.4 kg)    SpO2 98%    BMI 28.78 kg/m2      O2 Device: Room air Temp (24hrs), Av.6 °F (36.4 °C), Min:97.3 °F (36.3 °C), Max:98 °F (36.7 °C)      Last 24hr Input/Output:  No intake or output data in the 24 hours ending 17 1331     PHYSICAL EXAM:  General:     Alert, cooperative, no distress, appears stated age. Head:    Normocephalic, without obvious abnormality, atraumatic. Eyes:    Conjunctivae/corneas clear. PERRLA  Nose:   Nares normal. No drainage or sinus tenderness.   Throat:     Lips, mucosa, and tongue normal.  No Thrush  Neck:   Supple, symmetrical,  no adenopathy, thyroid: non tender     no carotid bruit and no JVD. Back:     Symmetric,  No CVA tenderness. Lungs:    Clear to auscultation bilaterally. No Wheezing or Rhonchi. No rales. Heart:    Regular rate and rhythm,  no murmur, rub or gallop. Abdomen:    Soft, non-tender. Not distended. Bowel sounds normal. No masses  Extremities:  Extremities normal, atraumatic, No cyanosis. No edema. No clubbing  Lymph nodes:  Cervical, supraclavicular normal.  Neurologic:  Normal strength, Alert and oriented X 3. Skin:                No rash      Lab Data Reviewed:    Recent Results (from the past 24 hour(s))   METABOLIC PANEL, BASIC    Collection Time: 11/28/17  5:12 AM   Result Value Ref Range    Sodium 137 136 - 145 mmol/L    Potassium 4.0 3.5 - 5.1 mmol/L    Chloride 102 97 - 108 mmol/L    CO2 29 21 - 32 mmol/L    Anion gap 6 5 - 15 mmol/L    Glucose 105 (H) 65 - 100 mg/dL    BUN 18 6 - 20 MG/DL    Creatinine 1.16 0.70 - 1.30 MG/DL    BUN/Creatinine ratio 16 12 - 20      GFR est AA >60 >60 ml/min/1.73m2    GFR est non-AA >60 >60 ml/min/1.73m2    Calcium 8.7 8.5 - 10.1 MG/DL   CBC WITH AUTOMATED DIFF    Collection Time: 11/28/17  5:12 AM   Result Value Ref Range    WBC 6.3 4.1 - 11.1 K/uL    RBC 5.13 4.10 - 5.70 M/uL    HGB 16.0 12.1 - 17.0 g/dL    HCT 45.8 36.6 - 50.3 %    MCV 89.3 80.0 - 99.0 FL    MCH 31.2 26.0 - 34.0 PG    MCHC 34.9 30.0 - 36.5 g/dL    RDW 13.3 11.5 - 14.5 %    PLATELET 795 740 - 598 K/uL    NEUTROPHILS 57 32 - 75 %    LYMPHOCYTES 31 12 - 49 %    MONOCYTES 6 5 - 13 %    EOSINOPHILS 6 0 - 7 %    BASOPHILS 0 0 - 1 %    ABS. NEUTROPHILS 3.6 1.8 - 8.0 K/UL    ABS. LYMPHOCYTES 2.0 0.8 - 3.5 K/UL    ABS. MONOCYTES 0.4 0.0 - 1.0 K/UL    ABS. EOSINOPHILS 0.4 0.0 - 0.4 K/UL    ABS.  BASOPHILS 0.0 0.0 - 0.1 K/UL   HEMOGLOBIN A1C WITH EAG    Collection Time: 11/28/17  5:12 AM   Result Value Ref Range    Hemoglobin A1c 5.6 4.2 - 6.3 %    Est. average glucose 114 mg/dL   LIPID PANEL    Collection Time: 11/28/17  5:12 AM   Result Value Ref Range    LIPID PROFILE          Cholesterol, total 216 (H) <200 MG/DL    Triglyceride 183 (H) <150 MG/DL    HDL Cholesterol 40 MG/DL    LDL, calculated 139.4 (H) 0 - 100 MG/DL    VLDL, calculated 36.6 MG/DL    CHOL/HDL Ratio 5.4 (H) 0 - 5.0     TSH 3RD GENERATION    Collection Time: 11/28/17  5:12 AM   Result Value Ref Range    TSH 1.66 0.36 - 3.74 uIU/mL         Assessment/Plan:     Active Problems:    TIA (transient ischemic attack) (11/27/2017)       ___________________________________________________  PLAN:    1. Follow up ECHO and carotid doppler  2. Add ASA and low dose statin  3. Neurology consult  4.  Possibly home this evening if results noted and patient stable            ___________________________________________________    Attending Physician: Veronica Michelle MD

## 2017-11-28 NOTE — PROGRESS NOTES
Speech LAnguage Pathology evaluation/discharge  Patient: Alan Almaguer [de-identified]80 y.o. male)  Date: 11/28/2017  Primary Diagnosis: TIA (transient ischemic attack)  TIA (transient ischemic attack)        Precautions:        ASSESSMENT :  Based on the objective data described below, the patient presents with intact language and functional motor speech. Patient 100% intelligible in conversation. Patient reports significant improvement in speech/language function compared to yesterday, however reports he has not yet returned to baseline. Patient with excellent prognosis for return to baseline given progress to date. Skilled therapy provided by a speech-language pathologist is not indicated at this time. PLAN :  Recommendations:  --No SLP treatment indicated  Discharge Recommendations: None     SUBJECTIVE:   Patient stated Could this happen again?  Patient alert, appropriate, cooperative. Oriented x4.     OBJECTIVE:     Past Medical History:   Diagnosis Date    Acute gout of right foot 11/1/2017    Allergic rhinitis 11/1/2017    Impression: trial of otc Zyrtec    Arthralgia 11/1/2017    Impression: left hip, possibly referred from spine    Arthritis 11/1/2017    Back pain 11/1/2017    Chest pain 11/1/2017    Impression: left shoulder/arm pain ?anginal equivalent    Cough 11/1/2017    Impression: suspect medication related, will follow    Fatigue 11/1/2017    High cholesterol     HTN (hypertension) 11/1/2017    Impression: stable on HCTZ    Hypertension     intermittent    Hypertriglyceridemia 11/1/2017    Insomnia 11/1/2017    LBBB (left bundle branch block)     Nasal polyp 11/1/2017    Prostate cancer screening 11/1/2017    Rash 11/1/2017    Wrist pain, acute, left 11/1/2017    Impression: mostly resolved, pt concerned this was heart related, more likely musculoskeletal or neuropathic, observe, if increases/persists follow up     Past Surgical History:   Procedure Laterality Date    HX CIRCUMCISION 2013    HX MOHS PROCEDURES Left     NEUROLOGICAL PROCEDURE UNLISTED      \"pinched nerve neck\"     Prior Level of Function/Home Situation:   Home Situation  Home Environment: Private residence ([facility])  # Steps to Enter: 0  One/Two Story Residence: Two story, live on 1st floor  # of Interior Steps: 0 (patient states he does not go upstairs)  Interior Rails: Both  Lift Chair Available: No  Living Alone: No  Support Systems: Child(trey), Family member(s), Spouse/Significant Other/Partner  Patient Expects to be Discharged to[de-identified] Private residence  Current DME Used/Available at Home: None  Mental Status:  Neurologic State: Alert  Orientation Level: Oriented X4  Cognition: Appropriate decision making  Perception: Appears intact  Perseveration: No perseveration noted  Safety/Judgement: Awareness of environment, Insight into deficits  Motor Speech:     Language Comprehension and Expression:  Auditory Comprehension  Auditory Impairment: No   Response to Complex Yes/No Questions (%) : 100 %  Three-Step Basic Commands (%): 100 %  Verbal Expression  Automatic speech task cueing amount:  (100%)  Repetition: No impairment  Naming: No impairment  Confrontation (%): 100 %  Sentence Formulation: No impairment (100%)  Conversation: No impairment  Overall Impairment: None               G Codes: In compliance with CMSs Claims Based Outcome Reporting, the following G-code set was chosen for this patient based the use of the NOMS functional outcome to quantify this patient's level of motor speech impairment. Using the NOMS, the patient was determined to be at level 7 for motor speech function which correlates with the CH= 0% level of severity.     Based on the objective assessment provided within this note, the current, goal, and discharge g-codes are as follows:    Swallow  Swallowing:   Swallow Current Status CH= 0%   Swallow Goal Status CH= 0%   Swallow D/C Status CH= 0%      NOMS: The NOMS functional outcome measure was used to quantify this patient's level of motor speech impairment. Based on the NOMS, the patient was determined to be at level 7 for motor speech function. NOMS Motor Speech:  Level 1 (CN):  100% unintelligible  Level 2 (CM):  Communication partner responsible for message; can do CV or automatic words w/ max cues but rarely intelligible in context  Level 3 (CL): communication partner primarily responsible for message but says CV/automatic words intelligibly; mod cues to say simple words/phrases  Level 4 (CK): In structured conversation with familiar listener can say simple words and phrases. Mod cues for simple sentences  Level 5 (CJ):  Uses simple sentences for ADLs with familiar and unfamiliar listener; min cues for complex sentences  Level 6 (CI):  Intelligible in ADLs; difficulty in voc/social activites; rare cues for complex message; uses comp strategies  Level 7 ([address] [place] [address]):  Intelligible in all activities. May occasionally use compensatory strategies. LIO. (2003). National Outcomes Measurement System (NOMS): Adult Speech-Language Pathology User's Guide. Pain:  Pain Scale 1: Numeric (0 - 10)  Pain Intensity 1: 0     After treatment:   [x]   Patient left in no apparent distress sitting up in chair  []   Patient left in no apparent distress in bed  [x]   Call bell left within reach  [x]   Nursing notified  [x]   Caregiver present  []   Bed alarm activated    COMMUNICATION/EDUCATION:   The patients plan of care including findings and recommendations was discussed with: Registered Nurse. Patient was educated regarding His deficit(s) of functional language/motor speech as this relates to His diagnosis of ?CVA. He demonstrated Excellent understanding as evidenced by verbalizing understanding. [x]   Patient/family have participated as able and agree with findings and recommendations. []   Patient is unable to participate in plan of care at this time.     Thank you for this referral.  Matti Gauthier, RAMON  Time Calculation: 15 mins

## 2017-11-28 NOTE — PROGRESS NOTES
Problem: Mobility Impaired (Adult and Pediatric)  Goal: *Acute Goals and Plan of Care (Insert Text)  physical Therapy EVALUATION- neuro population    Patient: Ariela Dasilva [de-identified]80 y.o. male)  Date: 11/28/2017  Primary Diagnosis: TIA (transient ischemic attack)  TIA (transient ischemic attack)        Precautions:standard      ASSESSMENT :  Based on the objective data described below, the patient presents with good strength, balance, functional mobility skills. Patient is independent with bed mobility, transfers, and ambulates 180' with supervision only with no assistive device. Patient scored 52/56 on Hillman balance test.  Patient feels he is at baseline for mobility. Patient feels his only deficit is with his speech (RN notified). Prior to admission, patient was independent with all mobility and is active at home. Lives with wife. No further PT recommended. Patient will benefit from skilled intervention to address the above impairments.   Patients rehabilitation potential is considered to be Excellent  Factors which may influence rehabilitation potential include:   [x]           None noted  []           Mental ability/status  []           Medical condition  []           Home/family situation and support systems  []           Safety awareness  []           Pain tolerance/management  []           Other:      PLAN :  Recommendations and Planned Interventions:  []             Bed Mobility Training             []      Neuromuscular Re-Education  []             Transfer Training                   []      Orthotic/Prosthetic Training  []             Gait Training                         []      Modalities  []             Therapeutic Exercises           []      Edema Management/Control  []             Therapeutic Activities            []      Patient and Family Training/Education  []             Other (comment):     Discharge Recommendations: None  Further Equipment Recommendations for Discharge: none     SUBJECTIVE: Patient stated I can walk fine.     OBJECTIVE DATA SUMMARY:   HISTORY:    Past Medical History:   Diagnosis Date    Acute gout of right foot 11/1/2017    Allergic rhinitis 11/1/2017    Impression: trial of otc Zyrtec    Arthralgia 11/1/2017    Impression: left hip, possibly referred from spine    Arthritis 11/1/2017    Back pain 11/1/2017    Chest pain 11/1/2017    Impression: left shoulder/arm pain ?anginal equivalent    Cough 11/1/2017    Impression: suspect medication related, will follow    Fatigue 11/1/2017    High cholesterol     HTN (hypertension) 11/1/2017    Impression: stable on HCTZ    Hypertension     intermittent    Hypertriglyceridemia 11/1/2017    Insomnia 11/1/2017    LBBB (left bundle branch block)     Nasal polyp 11/1/2017    Prostate cancer screening 11/1/2017    Rash 11/1/2017    Wrist pain, acute, left 11/1/2017    Impression: mostly resolved, pt concerned this was heart related, more likely musculoskeletal or neuropathic, observe, if increases/persists follow up     Past Surgical History:   Procedure Laterality Date    HX CIRCUMCISION  2013    HX MOHS PROCEDURES Left     NEUROLOGICAL PROCEDURE UNLISTED      \"pinched nerve neck\"     Prior Level of Function/Home Situation: Independent with all mobility.   Personal factors and/or comorbidities impacting plan of care: None noted    Home Situation  Home Environment: Private residence ([facility])  # Steps to Enter: 0  One/Two Story Residence: Two story, live on 1st floor  # of Interior Steps: 0 (patient states he does not go upstairs)  Interior Rails: Both  Lift Chair Available: No  Living Alone: No  Support Systems: Child(trey), Family member(s), Spouse/Significant Other/Partner  Patient Expects to be Discharged to[de-identified] Private residence  Current DME Used/Available at Home: None    EXAMINATION/PRESENTATION/DECISION MAKING:   Critical Behavior:  Neurologic State: Alert  Orientation Level: Oriented X4  Cognition: Appropriate decision making  Safety/Judgement: Awareness of environment, Insight into deficits  Hearing: Auditory  Auditory Impairment: None  Skin:  intact  Edema: none noted  Range Of Motion:  AROM: Within functional limits           PROM: Within functional limits           Strength:    Strength: Within functional limits                    Tone & Sensation:   Tone: Normal              Sensation: Intact               Coordination:  Coordination: Within functional limits  Vision:      Functional Mobility:  Bed Mobility:              Transfers:  Sit to Stand: Independent  Stand to Sit: Independent        Bed to Chair: Independent              Balance:   Sitting: Intact  Standing: Intact  Ambulation/Gait Training:  Distance (ft): 180 Feet (ft)  Assistive Device: Gait belt  Ambulation - Level of Assistance: Supervision                       Speed/Jannette: Fluctuations  Step Length: Left shortened;Right shortened                 Functional Measure  Hillman Balance Test:    Sitting to Standin  Standing Unsupported: 4  Sitting with Back Unsupported: 4  Standing to Sittin  Transfers: 4  Standing Unsupported with Eyes Closed: 3  Standing Unsupported with Feet Together: 4  Reach Forward with Outstretched Arm: 4   Object: 4  Turn to Look Over Shoulders: 4  Turn 360 Degrees: 4  Alternate Foot on Step/Stool: 4  Standing Unsupported One Foot in Front: 3  Stand on One Le  Total: 52         56=Maximum possible score;   0-20=High fall risk  21-40=Moderate fall risk   41-56=Low fall risk     Hillman Balance Test and G-code impairment scale:  Percentage of Impairment CH    0%   CI    1-19% CJ    20-39% CK    40-59% CL    60-79% CM    80-99% CN     100%   Hillman   Score 0-56 56 45-55 34-44 23-33 12-22 1-11 0       G codes: In compliance with CMSs Claims Based Outcome Reporting, the following G-code set was chosen for this patient based on their primary functional limitation being treated:     The outcome measure chosen to determine the severity of the functional limitation was the Hillman balance test with a score of 52/56 which was correlated with the impairment scale. ? Mobility - Walking and Moving Around:     - CURRENT STATUS: CI - 1%-19% impaired, limited or restricted    - GOAL STATUS: CI - 1%-19% impaired, limited or restricted    - D/C STATUS:  CI - 1%-19% impaired, limited or restricted     Physical Therapy Evaluation Charge Determination   History Examination Presentation Decision-Making   LOW Complexity : Zero comorbidities / personal factors that will impact the outcome / POC LOW Complexity : 1-2 Standardized tests and measures addressing body structure, function, activity limitation and / or participation in recreation  LOW Complexity : Stable, uncomplicated  LOW Complexity : FOTO score of       Based on the above components, the patient evaluation is determined to be of the following complexity level: LOW       Pain:  Pain Scale 1: Numeric (0 - 10)  Pain Intensity 1: 0              Activity Tolerance:   good  Please refer to the flowsheet for vital signs taken during this treatment. After treatment:   [x]     Patient left in no apparent distress sitting up in chair  []     Patient left in no apparent distress in bed  [x]     Call bell left within reach  [x]     Nursing notified  [x]     Caregiver present  []     Bed alarm activated    COMMUNICATION/EDUCATION:   The patients plan of care was discussed with: Speech Therapist and Registered Nurse. Patient was educated regarding His deficit(s) of speech as this relates to His diagnosis of TIA. He demonstrated Good understanding as evidenced by feedback. Patient and/or family was verbally educated on the BE FAST acronym for signs/symptoms of CVA and TIA. BE FAST was written on patient's communication board  for visual education and reinforcement. All questions answered with patient indicating good understanding.      [x]  Fall prevention education was provided and the patient/caregiver indicated understanding. [x]  Patient/family have participated as able in goal setting and plan of care. []  Patient/family agree to work toward stated goals and plan of care. []  Patient understands intent and goals of therapy, but is neutral about his/her participation. []  Patient is unable to participate in goal setting and plan of care.     Thank you for this referral.  Amairani Zheng, PT   Time Calculation: 18 mins

## 2017-11-28 NOTE — PROGRESS NOTES
Bedside shift change report given to Wauneta Goldberg, RN (oncoming nurse) by Urvashi Oh RN (offgoing nurse). Report included the following information SBAR, Kardex, Intake/Output, Recent Results and Cardiac Rhythm NSR .

## 2017-11-28 NOTE — PROGRESS NOTES
[facility]  Preliminary Report:  Carotid Duplex Scan    Right:  Mild plaque noted in the right carotid system. Right ICA velocities suggest less than 50% diameter reduction. Right vertebral artery flow is unable to be visualized. Left:  Mild plaque noted in the left carotid system. Left ICA velocities suggest 50-69% diameter reduction. Left vertebral artery flow is antegrade. Final report to follow.

## 2017-11-28 NOTE — PROCEDURES
[facility]  *** FINAL REPORT ***    Name: Adrien Muñiz  MRN: [id number]    Inpatient  : 03 Dec 1935  HIS Order #: [id number]  [id number] [facility] Visit #: [id number]  Date: 2017    TYPE OF TEST: Cerebrovascular Duplex    REASON FOR TEST  Arterial occlusion    Right Carotid:-             Proximal               Mid                 Distal  cm/s  Systolic  Diastolic  Systolic  Diastolic  Systolic  Diastolic  CCA:     37.7      12.0                            44.0      13.0  Bulb:  ECA:     94.0      10.0  ICA:     96.0      31.0       58.0      18.0       55.0      17.0  ICA/CCA:  2.2       2.4    ICA Stenosis: <50%    Right Vertebral:-  Finding: Not visualized  Sys:  Flower:    Right Subclavian: Normal    Left Carotid:-            Proximal                Mid                 Distal  cm/s  Systolic  Diastolic  Systolic  Diastolic  Systolic  Diastolic  CCA:     20.3      12.0                            60.0      13.0  Bulb:  ECA:     85.0      11.0  ICA:     50.0      17.0      106.0      33.0      140.0      38.0  ICA/CCA:  0.8       1.3    ICA Stenosis: 50-69%    Left Vertebral:-  Finding: Antegrade  Sys:       34.0  Flower:       12.0    Left Subclavian: Normal    INTERPRETATION/FINDINGS  PROCEDURE:  Carotid Duplex Examination using B-mode, color and  spectral Doppler of the extracranial cerebrovascular arteries. 1. <50% stenosis in the right internal carotid artery. 2. 50-69% stenosis in the left internal carotid artery. 3. No significant stenosis in the external carotid arteries  bilaterally. 4. Unable to visualize the right vertebral artery. 5. Antegrade flow in the left vertebral artery. 6. Normal flow in both subclavian arteries. Plaque Morphology:  1. Heterogeneous plaque in the bulb and right ICA. 2. Heterogeneous plaque in the bulb and left ICA. ADDITIONAL COMMENTS    I have personally reviewed the data relevant to the interpretation of  this  study. TECHNOLOGIST: Diana Mitchell RITU FALK  Signed: 11/28/2017 09:51 AM    PHYSICIAN: Lavinia Kehr A. Lucilla Harbor, MD  Signed: 11/28/2017 03:34 PM

## 2017-11-28 NOTE — PROGRESS NOTES
Problem: Falls - Risk of  Goal: *Absence of Falls  Document Lashell Fall Risk and appropriate interventions in the flowsheet.    Outcome: Progressing Towards Goal  Fall Risk Interventions:            Medication Interventions: Bed/chair exit alarm, Patient to call before getting OOB

## 2017-11-28 NOTE — CONSULTS
NEUROLOGY NOTE       DATE OF CONSULTATION: 11/28/2017    CONSULTED BY: Ibeth Zhang MD    Chief Complaint   Patient presents with    Dysarthria     Pt states he awoke around 8am with slurred speech Pt states he went to bed around 2300 without incident Pt wife states she medicated him with 81 mg ASA prior to coming to ED       Reason for Consult  I have been asked to see the patient in neurological consultation to render advice and opinion regarding stroke    HISTORY OF PRESENT ILLNESS  Maynor Mcneil is a 80 y.o. male who presents to the hospital because of expressive aphasia. Yesterday morning, on 11/27/2017 he was going to the botanical garden to help out with the lights. On the way over there she was not able to get the words out when he was talking with his friend. He came home and the wife noticed the same. He knew what he wanted to say but was not able to express it. This lasted around 6 hours and then his symptoms resolved. His CT scan of the head was normal and carotid ultrasound shows no significant stenosis. The patient has been started on aspirin 81 mg a day and Lipitor. The patient is back to his baseline.     ROS  A ten system review of constitutional, cardiovascular, respiratory, musculoskeletal, endocrine, skin, SHEENT, genitourinary, psychiatric and neurologic systems was obtained and is unremarkable except as stated in HPI     PMH  Past Medical History:   Diagnosis Date    Acute gout of right foot 11/1/2017    Allergic rhinitis 11/1/2017    Impression: trial of otc Zyrtec    Arthralgia 11/1/2017    Impression: left hip, possibly referred from spine    Arthritis 11/1/2017    Back pain 11/1/2017    Chest pain 11/1/2017    Impression: left shoulder/arm pain ?anginal equivalent    Cough 11/1/2017    Impression: suspect medication related, will follow    Fatigue 11/1/2017    High cholesterol     HTN (hypertension) 11/1/2017    Impression: stable on HCTZ    Hypertension intermittent    Hypertriglyceridemia 11/1/2017    Insomnia 11/1/2017    LBBB (left bundle branch block)     Nasal polyp 11/1/2017    Prostate cancer screening 11/1/2017    Rash 11/1/2017    Wrist pain, acute, left 11/1/2017    Impression: mostly resolved, pt concerned this was heart related, more likely musculoskeletal or neuropathic, observe, if increases/persists follow up       Todd Longoria  History reviewed. No pertinent family history. Anya Youngblood  Social History     Social History    Marital status:      Spouse name: N/A    Number of children: N/A    Years of education: N/A     Social History Main Topics    Smoking status: Former Smoker    Smokeless tobacco: Never Used    Alcohol use Yes      Comment: 1-2 beers per month    Drug use: No    Sexual activity: Not Asked     Other Topics Concern    None     Social History Narrative       ALLERGIES  Allergies   Allergen Reactions    Hydrocodone Other (comments)     jittery    Lisinopril Cough       PHYSICAL EXAM  EXAMINATION:   Patient Vitals for the past 24 hrs:   Temp Pulse Resp BP SpO2   11/28/17 1145 97.5 °F (36.4 °C) 73 18 121/82 98 %   11/28/17 1103 97.5 °F (36.4 °C) 68 18 133/82 99 %   11/28/17 0745 97.6 °F (36.4 °C) 71 - 124/75 98 %   11/28/17 0256 97.5 °F (36.4 °C) 71 18 117/74 96 %   11/28/17 0013 97.8 °F (36.6 °C) 75 18 118/77 95 %   11/27/17 1909 98 °F (36.7 °C) 93 18 132/86 96 %   11/27/17 1552 97.3 °F (36.3 °C) 72 18 148/90 96 %   11/27/17 1457 - 62 16 - 98 %   11/27/17 1445 - 64 12 136/67 94 %        General:   General appearance: Pt is in no acute distress   Distal pulses are preserved  Fundoscopic exam: attempted    Neurological Examination:   Mental Status:  AAO x3. Speech is fluent. Follows commands, has normal fund of knowledge, attention, short term recall, comprehension and insight. Cranial Nerves: Visual fields are full. PERRL, Extraocular movements are full. Facial sensation intact V1- V3. Facial movement intact, symmetric. Hearing intact to conversation. Palate elevates symmetrically. Shoulder shrug symmetric. Tongue midline. Motor: Strength is 5/5 in all 4 ext. Normal tone. No atrophy. Sensation: Normal to light touch    Reflexes: DTRs 1+ in upper extremities and absent in the lower extremities. Coordination/Cerebellar: Intact to finger-nose-finger     Gait: casual gait is normal.     Skin: No significant bruising or lacerations. LAB DATA REVIEWED:    Recent Results (from the past 24 hour(s))   METABOLIC PANEL, BASIC    Collection Time: 11/28/17  5:12 AM   Result Value Ref Range    Sodium 137 136 - 145 mmol/L    Potassium 4.0 3.5 - 5.1 mmol/L    Chloride 102 97 - 108 mmol/L    CO2 29 21 - 32 mmol/L    Anion gap 6 5 - 15 mmol/L    Glucose 105 (H) 65 - 100 mg/dL    BUN 18 6 - 20 MG/DL    Creatinine 1.16 0.70 - 1.30 MG/DL    BUN/Creatinine ratio 16 12 - 20      GFR est AA >60 >60 ml/min/1.73m2    GFR est non-AA >60 >60 ml/min/1.73m2    Calcium 8.7 8.5 - 10.1 MG/DL   CBC WITH AUTOMATED DIFF    Collection Time: 11/28/17  5:12 AM   Result Value Ref Range    WBC 6.3 4.1 - 11.1 K/uL    RBC 5.13 4.10 - 5.70 M/uL    HGB 16.0 12.1 - 17.0 g/dL    HCT 45.8 36.6 - 50.3 %    MCV 89.3 80.0 - 99.0 FL    MCH 31.2 26.0 - 34.0 PG    MCHC 34.9 30.0 - 36.5 g/dL    RDW 13.3 11.5 - 14.5 %    PLATELET 665 897 - 890 K/uL    NEUTROPHILS 57 32 - 75 %    LYMPHOCYTES 31 12 - 49 %    MONOCYTES 6 5 - 13 %    EOSINOPHILS 6 0 - 7 %    BASOPHILS 0 0 - 1 %    ABS. NEUTROPHILS 3.6 1.8 - 8.0 K/UL    ABS. LYMPHOCYTES 2.0 0.8 - 3.5 K/UL    ABS. MONOCYTES 0.4 0.0 - 1.0 K/UL    ABS. EOSINOPHILS 0.4 0.0 - 0.4 K/UL    ABS.  BASOPHILS 0.0 0.0 - 0.1 K/UL   HEMOGLOBIN A1C WITH EAG    Collection Time: 11/28/17  5:12 AM   Result Value Ref Range    Hemoglobin A1c 5.6 4.2 - 6.3 %    Est. average glucose 114 mg/dL   LIPID PANEL    Collection Time: 11/28/17  5:12 AM   Result Value Ref Range    LIPID PROFILE          Cholesterol, total 216 (H) <200 MG/DL    Triglyceride 183 (H) <150 MG/DL    HDL Cholesterol 40 MG/DL    LDL, calculated 139.4 (H) 0 - 100 MG/DL    VLDL, calculated 36.6 MG/DL    CHOL/HDL Ratio 5.4 (H) 0 - 5.0     TSH 3RD GENERATION    Collection Time: 11/28/17  5:12 AM   Result Value Ref Range    TSH 1.66 0.36 - 3.74 uIU/mL        Imaging review:  See below. Stroke workup    CT head  No acute changes. Carotids:   1. <50% stenosis in the right internal carotid artery. 2. 50-69% stenosis in the left internal carotid artery. 3. No significant stenosis in the external carotid arteries  bilaterally. 4. Unable to visualize the right vertebral artery. 5. Antegrade flow in the left vertebral artery. 6. Normal flow in both subclavian arteries. Plaque Morphology:  1. Heterogeneous plaque in the bulb and right ICA. 2. Heterogeneous plaque in the bulb and left ICA. TTE:   Pending    Stroke labs:  HgBA1c    Lab Results   Component Value Date/Time    Hemoglobin A1c 5.6 11/28/2017 05:12 AM     LDL   Lab Results   Component Value Date/Time    LDL, calculated 139.4 11/28/2017 05:12 AM       HOME MEDS  Prior to Admission Medications   Prescriptions Last Dose Informant Patient Reported? Taking? diphenhydrAMINE (BENADRYL) 25 mg capsule 11/27/2017 at Unknown time Self Yes Yes   Sig: Take 25 mg by mouth every six (6) hours as needed for Itching or Skin Irritation. hydrochlorothiazide (HYDRODIURIL) 25 mg tablet 11/27/2017 at Unknown time Self Yes Yes   Sig: Take 25 mg by mouth daily.  Indications: HYPERTENSION      Facility-Administered Medications: None       CURRENT MEDS  Current Facility-Administered Medications   Medication Dose Route Frequency    atorvastatin (LIPITOR) tablet 10 mg  10 mg Oral QHS    sodium chloride (NS) flush 5-10 mL  5-10 mL IntraVENous Q8H    hydroCHLOROthiazide (HYDRODIURIL) tablet 25 mg  25 mg Oral DAILY    sodium chloride (NS) flush 5-10 mL  5-10 mL IntraVENous Q8H    heparin (porcine) injection 5,000 Units  5,000 Units SubCUTAneous Q8H    aspirin chewable tablet 81 mg  81 mg Oral DAILY       IMPRESSION:  Autumn You is a 80 y.o. male who presents with new onset expressive aphasia which lasted for 6 hours and now has resolved. Examination is nonfocal.  The patient did have a left hemispheric TIA. Risk factors include hypertension and hyperlipidemia. The patient is on statin and aspirin 81 mg a day. The patient does have bilateral carotid stenosis but they are not significant. 1.  TIA  2. Hypertension  3. Hyperlipidemia  4. Carotid stenosis    RECOMMENDATIONS:  - CT head normal  - Carotid US  -less than 50% stenosis in right ICA and 50-69% stenosis in the left ICA  - TTE - Pending  - Telemetry  - BP goal is less than 140/90  - Stroke labs (HgbA1c, lipid panel)  - Continue ASA 81 mg daily  - Start atorvastatin 40 mg daily as LDL more than 70.  - ST/OT/PT eval  - Discussed  healthy lifestyle changes, and modifiable risk factors for stroke with patient and family    All the workup is done except the results of transthoracic echo. The patient does not need to stay here for the result and this can be followed up as an outpatient with the primary care physician. From neurological standpoint, the patient is okay to be discharged. I will see the patient office in 2-4 weeks. Thank you very much for this consultation. We will follow up on the above studies and give further recommendations as indicated.       Leonides Goldberg, MD  Neurologist

## 2017-11-28 NOTE — PROGRESS NOTES
Pt is an 80 y.o  male admitted with a TIA. Pt was alert, oriented and in no distress sitting in the chair with his wife at the bedside. Demographic information verified and all is correct. Pt lives with his wife in a 2 story town home with no steps to the entrance and they stay on the first floor. Prior to admission, pt was independent with his ADL's, IADL's and was driving. Denies using DME and had outpt rehab years ago. Preferred pharmacy is [facility] on [address] [place] [address]. Pt's wife can transport pt home by car. CM provided pt with the Observations letters, pt understood and signed them. Copy left with pt. CM consult noted and will continue to follow pt for discharge planning needs. Care Management Interventions  PCP Verified by CM: Yes (Dr. Katherin Valenzuela)  Mode of Transport at Discharge:  Other (see comment) (pt's wife can transport pt by car)  Transition of Care Consult (CM Consult): Discharge Planning  Discharge Durable Medical Equipment: No  Physical Therapy Consult: Yes  Occupational Therapy Consult: No  Speech Therapy Consult: No  Current Support Network: Lives with Spouse, Own Home (lives in a 2 story town home with no steps to the entrance)  Confirm Follow Up Transport: Family  Discharge Location  Discharge Placement: [facility] [address] [facility]

## 2017-11-28 NOTE — ROUTINE PROCESS
Bedside and Verbal shift change report given to Samia Hauser 238) by Tona Hines nurse). Report included the following information SBAR, Kardex, Recent Results, Med Rec Status and Cardiac Rhythm SR/PAC.       Zone Phone:             Significant changes during shift:  None    Patient Information      80 yr old, admitted on 11/27/17, patient of Dr. Shaina Rea, admitted from ED.      Problem List            Past Medical History:   Diagnosis Date    Acute gout of right foot 11/1/2017    Allergic rhinitis 11/1/2017     Impression: trial of otc Zyrtec    Arthralgia 11/1/2017     Impression: left hip, possibly referred from spine    Arthritis 11/1/2017    Back pain 11/1/2017    Chest pain 11/1/2017     Impression: left shoulder/arm pain ?anginal equivalent    Cough 11/1/2017     Impression: suspect medication related, will follow    Fatigue 11/1/2017    High cholesterol      HTN (hypertension) 11/1/2017     Impression: stable on HCTZ    Hypertension       intermittent    Hypertriglyceridemia 11/1/2017    Insomnia 11/1/2017    LBBB (left bundle branch block)      Nasal polyp 11/1/2017    Prostate cancer screening 11/1/2017    Rash 11/1/2017    Wrist pain, acute, left 11/1/2017     Impression: mostly resolved, pt concerned this was heart related, more likely musculoskeletal or neuropathic, observe, if increases/persists follow up               Past Surgical History:   Procedure Laterality Date    HX CIRCUMCISION   2013    HX MOHS PROCEDURES Left      NEUROLOGICAL PROCEDURE UNLISTED         \"pinched nerve neck\"           Core Measures:      CVA: TIA Yes  CHF: No  PNA: No    Activity Status:      OOB to Chair:  Yes  Ambulated this shift Yes  Bed Rest No      Supplemental D3: (VO Applicable)      Room Air          LINES AND DRAINS:      Central Line? No   PICC LINE? No   Urinary Catheter? No      DVT prophylaxis:      DVT prophylaxis Med- Heparin  DVT prophylaxis SCD or SHIV- Yes       Wounds: (If Applicable)      Wounds- No    Location      Patient Safety:      Falls Score Total Score:  1  Safety Level_______  Bed Alarm On? No  Sitter?  No      Plan for upcoming shift: 2 D echo      Discharge Plan: Pending      Active Consults:  Neuro, Casemanagement, PT

## 2017-11-29 VITALS
SYSTOLIC BLOOD PRESSURE: 105 MMHG | HEIGHT: 69 IN | HEART RATE: 77 BPM | DIASTOLIC BLOOD PRESSURE: 72 MMHG | RESPIRATION RATE: 16 BRPM | WEIGHT: 194.89 LBS | BODY MASS INDEX: 28.87 KG/M2 | OXYGEN SATURATION: 98 % | TEMPERATURE: 97.4 F

## 2017-11-29 PROCEDURE — 99218 HC RM OBSERVATION: CPT

## 2017-11-29 RX ORDER — ATORVASTATIN CALCIUM 40 MG/1
40 TABLET, FILM COATED ORAL
Qty: 30 TAB | Refills: 6 | Status: SHIPPED | OUTPATIENT
Start: 2017-11-29 | End: 2018-07-06 | Stop reason: SDUPTHER

## 2017-11-29 RX ORDER — GUAIFENESIN 100 MG/5ML
81 LIQUID (ML) ORAL DAILY
Qty: 30 TAB | Refills: 6 | Status: SHIPPED | OUTPATIENT
Start: 2017-11-29 | End: 2021-07-13

## 2017-11-29 RX ADMIN — Medication 10 ML: at 05:14

## 2017-11-29 NOTE — DISCHARGE INSTRUCTIONS
[facility] [address]  [phone]      Patient Discharge Instructions    Zack Escobar / [id number] : 1935    Admitted 2017 Discharged: 17     Take Home Medications            · It is important that you take the medication exactly as they are prescribed. · Keep your medication in the bottles provided by the pharmacist and keep a list of the medication names, dosages, and times to be taken in your wallet. · Do not take other medications without consulting your doctor. What to do at Home    Recommended diet: Cardiac Diet,     Recommended activity: Activity as tolerated,       Follow-up with Dr. Randi Wilson in 1 week. Call [phone] for your appointment. Information obtained by :  I understand that if any problems occur once I am at home I am to contact my physician. I understand and acknowledge receipt of the instructions indicated above.                                                                                                                                            Physician's or R.N.'s Signature                                                                  Date/Time                                                                                                                                              Patient or Representative Signature                                                          Date/Time

## 2017-11-29 NOTE — PROGRESS NOTES
Bedside shift change report given to Elena Garcia RN (oncoming nurse) by Nate Shultz RN (offgoing nurse). Report included the following information SBAR, Kardex and Recent Results.     Zone Phone:   [phone]      Significant changes during shift:  none        Patient Information    Ya Mendoza  80 y.o.  11/27/2017  9:18 AM by Bart Bamberger, MD. Ya Mendoza was admitted from Home    Problem List    Patient Active Problem List    Diagnosis Date Noted    Bilateral carotid artery stenosis 11/28/2017    Dysarthria due to recent cerebrovascular accident (CVA) 11/28/2017    TIA (transient ischemic attack) 11/27/2017    Insomnia 11/01/2017    Wrist pain, acute, left 11/01/2017    Allergic rhinitis 11/01/2017    Acute gout of right foot 11/01/2017    Arthralgia 11/01/2017    Nasal polyp 11/01/2017    Back pain 11/01/2017    Cough 11/01/2017    Chest pain 11/01/2017    Arthritis 11/01/2017    Hypertriglyceridemia 11/01/2017    Fatigue 11/01/2017    HTN (hypertension) 11/01/2017    Rash 11/01/2017    Prostate cancer screening 11/01/2017     Past Medical History:   Diagnosis Date    Acute gout of right foot 11/1/2017    Allergic rhinitis 11/1/2017    Impression: trial of otc Zyrtec    Arthralgia 11/1/2017    Impression: left hip, possibly referred from spine    Arthritis 11/1/2017    Back pain 11/1/2017    Chest pain 11/1/2017    Impression: left shoulder/arm pain ?anginal equivalent    Cough 11/1/2017    Impression: suspect medication related, will follow    Fatigue 11/1/2017    High cholesterol     HTN (hypertension) 11/1/2017    Impression: stable on HCTZ    Hypertension     intermittent    Hypertriglyceridemia 11/1/2017    Insomnia 11/1/2017    LBBB (left bundle branch block)     Nasal polyp 11/1/2017    Prostate cancer screening 11/1/2017    Rash 11/1/2017    Wrist pain, acute, left 11/1/2017    Impression: mostly resolved, pt concerned this was heart related, more likely musculoskeletal or neuropathic, observe, if increases/persists follow up       Activity Status:    OOB to Chair No  Ambulated this shift Yes   Bed Rest No    DVT prophylaxis:    DVT prophylaxis Med- Yes  DVT prophylaxis SCD or SHIV- Yes     Patient Safety:    Falls Score Total Score: 1  Safety Level_______  Bed Alarm On? No  Sitter?  No    Plan for upcoming shift: Go home        Discharge Plan: Yes     Active Consults:  IP CONSULT TO NEUROLOGY

## 2017-11-29 NOTE — PROGRESS NOTES
Problem: Falls - Risk of  Goal: *Absence of Falls  Document Lashell Fall Risk and appropriate interventions in the flowsheet.    Outcome: Progressing Towards Goal  Fall Risk Interventions:            Medication Interventions: Patient to call before getting OOB, Teach patient to arise slowly

## 2017-11-29 NOTE — PROGRESS NOTES
PROGRESS NOTE    NAME:  Yazmin Smith   :   1935   MRN:   [id number]     Date/Time:  2017 1:31 PM  Subjective:   History:  Pt. Denies current symptoms. Work up reviewed. Wife present. Medications reviewed:  Current Facility-Administered Medications   Medication Dose Route Frequency    atorvastatin (LIPITOR) tablet 40 mg  40 mg Oral QHS    sodium chloride (NS) flush 5-10 mL  5-10 mL IntraVENous Q8H    sodium chloride (NS) flush 5-10 mL  5-10 mL IntraVENous PRN    diphenhydrAMINE (BENADRYL) capsule 25 mg  25 mg Oral Q6H PRN    hydroCHLOROthiazide (HYDRODIURIL) tablet 25 mg  25 mg Oral DAILY    sodium chloride (NS) flush 5-10 mL  5-10 mL IntraVENous Q8H    sodium chloride (NS) flush 5-10 mL  5-10 mL IntraVENous PRN    acetaminophen (TYLENOL) tablet 650 mg  650 mg Oral Q6H PRN    ondansetron (ZOFRAN) injection 4 mg  4 mg IntraVENous Q6H PRN    docusate sodium (COLACE) capsule 100 mg  100 mg Oral DAILY PRN    heparin (porcine) injection 5,000 Units  5,000 Units SubCUTAneous Q8H    aspirin chewable tablet 81 mg  81 mg Oral DAILY    zolpidem (AMBIEN) tablet 5 mg  5 mg Oral QHS PRN        Objective:   Vitals:  Visit Vitals    /69 (BP 1 Location: Right arm, BP Patient Position: At rest)    Pulse 71    Temp 97.6 °F (36.4 °C)    Resp 16    Ht 5' 9\" (1.753 m)    Wt 194 lb 14.2 oz (88.4 kg)    SpO2 98%    BMI 28.78 kg/m2      O2 Device: Room air Temp (24hrs), Av.8 °F (36.6 °C), Min:97.5 °F (36.4 °C), Max:98.7 °F (37.1 °C)      Last 24hr Input/Output:  No intake or output data in the 24 hours ending 17 0806     PHYSICAL EXAM:  General:     Alert, cooperative, no distress, appears stated age. Head:    Normocephalic, without obvious abnormality, atraumatic. Eyes:    Conjunctivae/corneas clear. PERRLA  Nose:   Nares normal. No drainage or sinus tenderness.   Throat:     Lips, mucosa, and tongue normal.  No Thrush  Neck:   Supple, symmetrical,  no adenopathy, thyroid: non tender     no carotid bruit and no JVD. Back:     Symmetric,  No CVA tenderness. Lungs:    Clear to auscultation bilaterally. No Wheezing or Rhonchi. No rales. Heart:    Regular rate and rhythm,  no murmur, rub or gallop. Abdomen:    Soft, non-tender. Not distended. Bowel sounds normal. No masses  Extremities:  Extremities normal, atraumatic, No cyanosis. No edema. No clubbing  Lymph nodes:  Cervical, supraclavicular normal.  Neurologic:  Normal strength, Alert and oriented X 3. Skin:                No rash      Lab Data Reviewed:    No results found for this or any previous visit (from the past 24 hour(s)). Assessment/Plan:     Active Problems:    TIA (transient ischemic attack) (11/27/2017)      Bilateral carotid artery stenosis (11/28/2017)      Dysarthria due to recent cerebrovascular accident (CVA) (11/28/2017)       ___________________________________________________  PLAN:    1. Follow up ECHO pending, Carotid shows noncritical atherosclerosis L>R  2. Add ASA and low dose statin  3. Neurology consult appreciated  4.  Home today            ___________________________________________________    Attending Physician: Veronica Michelle MD

## 2017-11-29 NOTE — PROGRESS NOTES
F/u appointments made and documented on the discharge paperwork. CM met with pt and he was ready for discharge. Wife at bedside and will transport pt home by car. Care Management Interventions  PCP Verified by CM: Yes (Dr. Jadyn Ferguson)  Mode of Transport at Discharge:  Other (see comment) (pt's wife will transport by car)  Hospital Transport Time of Discharge: 1000  Transition of Care Consult (CM Consult): Discharge Planning  Discharge Durable Medical Equipment: No  Physical Therapy Consult: Yes  Occupational Therapy Consult: No  Speech Therapy Consult: Yes  Current Support Network: Lives with Spouse, Own Home (lives in a 2 story town home with no steps to the entrance)  Confirm Follow Up Transport: Family  Plan discussed with Pt/Family/Caregiver: Yes  Discharge Location  Discharge Placement: [address] [facility], [address] [place]

## 2017-11-29 NOTE — PROGRESS NOTES
DC instructions reviewed with patient and wife. F/U appointments made,side effects of statins reviewed,  both verbalize understanding of all information. Pt to go home with wife

## 2017-11-29 NOTE — DISCHARGE SUMMARY
Physician Discharge Summary     Patient ID:  Delores Chaidez  [id number]  27 y.o.  1935    Admit date: 11/27/2017  Discharge date and time:  11/22017    Discharge Diagnoses: TIA, HTN, hyperlipidemia, carotid artery disease    Hospital Course: Mr. Loras Rinne was admitted with dysarthria which began a couple of hours prior to admission. Upon arrival to the emergency room he underwent a head CT scan which was negative. His symptoms appeared to resolve but he was admitted for further workup for possible CVA versus TIA. The patient had follow-up lab work which revealed an LDL cholesterol of 139. His glucose was normal.  He remains on hydrochlorothiazide for mild hypertension. He is a non-smoker. The patient underwent an echocardiogram which is pending at the time of discharge. He had carotid Dopplers which showed noncritical carotid artery disease greater than 50% in the left carotid and less than 50% in the right carotid. His MRI did not demonstrate any evidence for an acute infarct. There are no other abnormalities detected. Patient's symptoms have completely resolved and will be discharged in stable condition. He was seen by neurology and addition of 81 mg enteric-coated aspirin and Lipitor 40 mg nightly were added to his medical regimen. Patient will follow-up in the office within 1 week at which point we will review his workup and his medical regimen. PCP: Veronica Michelle MD  Consults: Neurology    Significant Diagnostic Studies: Head CT, MRI of brain, carotid artery Dopplers, echocardiogram    [unfilled]    [unfilled]      Discharge Exam:  Visit Vitals    /69 (BP 1 Location: Right arm, BP Patient Position: At rest)    Pulse 71    Temp 97.6 °F (36.4 °C)    Resp 16    Ht 5' 9\" (1.753 m)    Wt 194 lb 14.2 oz (88.4 kg)    SpO2 98%    BMI 28.78 kg/m2     General:  Alert, cooperative, no distress, appears stated age. Head:  Normocephalic, without obvious abnormality, atraumatic. Eyes:  Conjunctivae/corneas clear. PERRL, EOMs intact. Fundi benign   Ears:  Normal TMs and external ear canals both ears. Nose: Nares normal. Septum midline. Mucosa normal. No drainage or sinus tenderness. Throat: Lips, mucosa, and tongue normal. Teeth and gums normal.   Neck: Supple, symmetrical, trachea midline, no adenopathy, thyroid: no enlargement/tenderness/nodules, no carotid bruit and no JVD. Back:   Symmetric, no curvature. ROM normal. No CVA tenderness. Lungs:   Clear to auscultation bilaterally. Chest wall:  No tenderness or deformity. Heart:  Regular rate and rhythm, S1, S2 normal, no murmur, click, rub or gallop. Abdomen:   Soft, non-tender. Bowel sounds normal. No masses,  No organomegaly. Genitalia:     Rectal:     Extremities: Extremities normal, atraumatic, no cyanosis or edema. Pulses: 2+ and symmetric all extremities. Skin: Skin color, texture, turgor normal. No rashes or lesions   Lymph nodes: Cervical, supraclavicular, and axillary nodes normal.   Neurologic: CNII-XII intact. Normal strength, sensation and reflexes throughout. Disposition: home    Patient Instructions:   Current Discharge Medication List      START taking these medications    Details   aspirin 81 mg chewable tablet Take 1 Tab by mouth daily. Qty: 30 Tab, Refills: 6      atorvastatin (LIPITOR) 40 mg tablet Take 1 Tab by mouth nightly. Qty: 30 Tab, Refills: 6         CONTINUE these medications which have NOT CHANGED    Details   diphenhydrAMINE (BENADRYL) 25 mg capsule Take 25 mg by mouth every six (6) hours as needed for Itching or Skin Irritation. hydrochlorothiazide (HYDRODIURIL) 25 mg tablet Take 25 mg by mouth daily.  Indications: HYPERTENSION               Signed:LEYLA Garcia MD  11/29/2017  8:14 AM

## 2017-11-29 NOTE — PROGRESS NOTES
Bedside and Verbal shift change report given to BENI Vu (oncoming nurse) by Merrily Burkitt nurse). Report included the following information SBAR, Kardex, Recent Results, Med Rec Status and Cardiac Rhythm SR/PAC.       Zone Phone:             Significant changes during shift:  all testing complete, waiting for echo to be read     Patient Information      80 yr old, admitted on 11/27/17, patient of Dr. Ernesto Newman, admitted from ED.      Problem List               Past Medical History:   Diagnosis Date    Acute gout of right foot 11/1/2017    Allergic rhinitis 11/1/2017      Impression: trial of otc Zyrtec    Arthralgia 11/1/2017      Impression: left hip, possibly referred from spine    Arthritis 11/1/2017    Back pain 11/1/2017    Chest pain 11/1/2017      Impression: left shoulder/arm pain ?anginal equivalent    Cough 11/1/2017      Impression: suspect medication related, will follow    Fatigue 11/1/2017    High cholesterol       HTN (hypertension) 11/1/2017      Impression: stable on HCTZ    Hypertension         intermittent    Hypertriglyceridemia 11/1/2017    Insomnia 11/1/2017    LBBB (left bundle branch block)       Nasal polyp 11/1/2017    Prostate cancer screening 11/1/2017    Rash 11/1/2017    Wrist pain, acute, left 11/1/2017      Impression: mostly resolved, pt concerned this was heart related, more likely musculoskeletal or neuropathic, observe, if increases/persists follow up                    Past Surgical History:   Procedure Laterality Date    HX CIRCUMCISION    2013    HX MOHS PROCEDURES Left       NEUROLOGICAL PROCEDURE UNLISTED            \"pinched nerve neck\"              Core Measures:      CVA: TIA   CHF: No  PNA: No    Activity Status:      OOB to Chair:  Yes  Ambulated this shift Yes  Bed Rest No      Supplemental L2: (NW Applicable)      Room Air          LINES AND DRAINS:      Central Line? No   PICC LINE? No   Urinary Catheter? No      DVT prophylaxis:      DVT prophylaxis Med- Heparin  DVT prophylaxis SCD or SHIV- Yes       Wounds: (If Applicable)      Wounds- No    Location      Patient Safety:      Falls Score Total Score:  1  Safety Level_______  Bed Alarm On? No  Sitter?  No      Plan for upcoming shift:  Discharge in the am, need echo to be read      Discharge Plan: Home      Active Consults:  Neuro, Casemanagement, PT

## 2017-12-08 ENCOUNTER — OFFICE VISIT (OUTPATIENT)
Dept: INTERNAL MEDICINE CLINIC | Age: 82
End: 2017-12-08

## 2017-12-08 VITALS
DIASTOLIC BLOOD PRESSURE: 77 MMHG | TEMPERATURE: 98.3 F | WEIGHT: 194.4 LBS | SYSTOLIC BLOOD PRESSURE: 119 MMHG | HEIGHT: 69 IN | BODY MASS INDEX: 28.79 KG/M2 | OXYGEN SATURATION: 96 % | HEART RATE: 73 BPM | RESPIRATION RATE: 18 BRPM

## 2017-12-08 DIAGNOSIS — G45.1 HEMISPHERIC CAROTID ARTERY SYNDROME: ICD-10-CM

## 2017-12-08 NOTE — MR AVS SNAPSHOT
Visit Information Date & Time Provider Department Dept. Phone Encounter #  
 12/8/2017 10:20 AM LEYLA Puentes MD [address] [place] [address] [facility] [phone] [id number] Follow-up Instructions Return for follow up, as scheduled. Your Appointments 12/20/2017  1:20 PM  
Follow Up with Asuncion Talavera MD  
Neurology Clinic at [facility] [address]) Appt Note: follow up from [facility]. ...[facility] 11/29/17  
 [address] [place] [address], 
[address] [place] [address], [address] [address]  
[address] [facility], [address] [facility], [address] [address] Upcoming Health Maintenance Date Due DTaP/Tdap/Td series (1 - Tdap) 12/3/1956 ZOSTER VACCINE AGE 60> 10/3/1995 GLAUCOMA SCREENING Q2Y 12/3/2000 MEDICARE YEARLY EXAM 12/3/2000 Pneumococcal 65+ Low/Medium Risk (2 of 2 - PPSV23) 1/1/2016 Allergies as of 12/8/2017  Review Complete On: 98/5/9898 By: Sreekanth Monique, RN Severity Noted Reaction Type Reactions Hydrocodone  12/01/2014   Intolerance Other (comments)  
 jittery Lisinopril  12/01/2014   Intolerance Cough Current Immunizations  Reviewed on 9/18/2017 Name Date Influenza High Dose Vaccine PF 9/15/2017 Pneumococcal Vaccine (Unspecified Type) 1/1/2011 Not reviewed this visit You Were Diagnosed With   
  
 Codes Comments Transition of care performed with sharing of clinical summary    -  Primary ICD-10-CM: Z91.89 ICD-9-CM: V15.89 Hemispheric carotid artery syndrome     ICD-10-CM: G45.1 ICD-9-CM: 435.8 Vitals BP Pulse Temp Resp Height(growth percentile) Weight(growth percentile) 119/77 (BP 1 Location: Left arm, BP Patient Position: Sitting) 73 98.3 °F (36.8 °C) (Oral) 18 5' 9\" (1.753 m) 194 lb 6.4 oz (88.2 kg) SpO2 BMI Smoking Status 96% 28.71 kg/m2 Former Smoker Vitals History BMI and BSA Data Body Mass Index Body Surface Area 28.71 kg/m 2 2.07 m 2 Preferred Pharmacy Pharmacy Name Phone [facility] [address] [phone] Your Updated Medication List  
  
   
This list is accurate as of: 12/8/17 11:07 AM.  Always use your most recent med list.  
  
  
  
  
 aspirin 81 mg chewable tablet Take 1 Tab by mouth daily. atorvastatin 40 mg tablet Commonly known as:  LIPITOR Take 1 Tab by mouth nightly. BENADRYL 25 mg capsule Generic drug:  diphenhydrAMINE Take 25 mg by mouth every six (6) hours as needed for Itching or Skin Irritation. hydroCHLOROthiazide 25 mg tablet Commonly known as:  HYDRODIURIL Take 25 mg by mouth daily. Indications: HYPERTENSION Follow-up Instructions Return for follow up, as scheduled. Introducing \[facility]\"" & [facility] SERVICES! Radha Olvera introduces [employer] patient portal. Now you can access parts of your medical record, email your doctor's office, and request medication refills online. 1. In your internet browser, go to [email or web address] 2. Click on the First Time User? Click Here link in the Sign In box. You will see the New Member Sign Up page. 3. Enter your [employer] Access Code exactly as it appears below. You will not need to use this code after youve completed the sign-up process. If you do not sign up before the expiration date, you must request a new code. · [employer] Access Code: [email or web address] Expires: 2/25/2018  9:57 AM 
 
4. Enter the last four digits of your Social Security Number (xxxx) and Date of Birth (mm/dd/yyyy) as indicated and click Submit. You will be taken to the next sign-up page. 5. Create a [employer] ID. This will be your [employer] login ID and cannot be changed, so think of one that is secure and easy to remember. 6. Create a [employer] password. You can change your password at any time. 7. Enter your Password Reset Question and Answer.  This can be used at a later time if you forget your password. 8. Enter your e-mail address. You will receive e-mail notification when new information is available in [address] 9. Click Sign Up. You can now view and download portions of your medical record. 10. Click the Download Summary menu link to download a portable copy of your medical information. If you have questions, please visit the Frequently Asked Questions section of the [employer] website. Remember, [employer] is NOT to be used for urgent needs. For medical emergencies, dial 911. Now available from your iPhone and Android! Please provide this summary of care documentation to your next provider. Your primary care clinician is listed as LEYLA Marcum. If you have any questions after today's visit, please call [phone].

## 2017-12-08 NOTE — PROGRESS NOTES
This note will not be viewable in [address] Monica Bui is a 80 y.o. male and presents with Transitions Of Care [facility] admitted for TIA on 11/27/2017 to 11/29/2017; Room 8)  . Subjective:  Mr. Durga Alfred presents today for transition of care follow-up after being admitted to the hospital for TIA on 27 November. His workup including MRI, carotid Doppler, echocardiogram were all essentially normal.  He has not had any recurring symptoms however he states that when he is fatigued he may slur a couple of words and sometimes will get worse. He states the hoarseness is been noted mostly with fatigue over the past several months. He denies any other complaints. He has follow-up with neurology which is pending. His follow-up with cardiology that had to be postponed while he was hospitalized.     Past Medical History:   Diagnosis Date    Acute gout of right foot 11/1/2017    Allergic rhinitis 11/1/2017    Impression: trial of otc Zyrtec    Arthralgia 11/1/2017    Impression: left hip, possibly referred from spine    Arthritis 11/1/2017    Back pain 11/1/2017    Chest pain 11/1/2017    Impression: left shoulder/arm pain ?anginal equivalent    Cough 11/1/2017    Impression: suspect medication related, will follow    Fatigue 11/1/2017    High cholesterol     HTN (hypertension) 11/1/2017    Impression: stable on HCTZ    Hypertension     intermittent    Hypertriglyceridemia 11/1/2017    Insomnia 11/1/2017    LBBB (left bundle branch block)     Nasal polyp 11/1/2017    Prostate cancer screening 11/1/2017    Rash 11/1/2017    Stroke ([facility] [address]     Wrist pain, acute, left 11/1/2017    Impression: mostly resolved, pt concerned this was heart related, more likely musculoskeletal or neuropathic, observe, if increases/persists follow up     Past Surgical History:   Procedure Laterality Date    HX CIRCUMCISION  2013    HX MOHS PROCEDURES Left     NEUROLOGICAL PROCEDURE UNLISTED      \"pinched nerve neck\" Allergies   Allergen Reactions    Hydrocodone Other (comments)     jittery    Lisinopril Cough     Current Outpatient Prescriptions   Medication Sig Dispense Refill    aspirin 81 mg chewable tablet Take 1 Tab by mouth daily. 30 Tab 6    atorvastatin (LIPITOR) 40 mg tablet Take 1 Tab by mouth nightly. 30 Tab 6    diphenhydrAMINE (BENADRYL) 25 mg capsule Take 25 mg by mouth every six (6) hours as needed for Itching or Skin Irritation.  hydrochlorothiazide (HYDRODIURIL) 25 mg tablet Take 25 mg by mouth daily. Indications: HYPERTENSION       Social History     Social History    Marital status:      Spouse name: N/A    Number of children: N/A    Years of education: N/A     Social History Main Topics    Smoking status: Former Smoker    Smokeless tobacco: Never Used    Alcohol use Yes      Comment: 1-2 beers per month    Drug use: No    Sexual activity: Not Asked     Other Topics Concern    None     Social History Narrative     History reviewed. No pertinent family history. Review of Systems  Constitutional:  negative for fevers, chills, anorexia and weight loss  Eyes:    negative for visual disturbance and irritation  ENT:    negative for tinnitus,sore throat,nasal congestion,ear pains. hoarseness  Respiratory:     negative for cough, hemoptysis, dyspnea,wheezing  CV:    negative for chest pain, palpitations, lower extremity edema  GI:    negative for nausea, vomiting, diarrhea, abdominal pain,melena  Endo:               negative for polyuria,polydipsia,polyphagia,heat intolerance  Genitourinary : negative for frequency, dysuria and hematuria  Integumentary: negative for rash and pruritus  Hematologic:   negative for easy bruising and gum/nose bleeding  Musculoskel:  negative for myalgias, arthralgias, back pain, muscle weakness, joint pain  Neurological:   negative for headaches, dizziness, vertigo, memory problems and gait   Behavl/Psych:  negative for feelings of anxiety, depression, mood changes  ROS otherwise negative      Objective:  Visit Vitals    /77 (BP 1 Location: Left arm, BP Patient Position: Sitting)    Pulse 73    Temp 98.3 °F (36.8 °C) (Oral)    Resp 18    Ht 5' 9\" (1.753 m)    Wt 194 lb 6.4 oz (88.2 kg)    SpO2 96%    BMI 28.71 kg/m2     Physical Exam:   General appearance - alert, well appearing, and in no distress  Mental status - alert, oriented to person, place, and time  EYE-KESHIA, EOMI, fundi normal, corneas normal, no foreign bodies  ENT-ENT exam normal, no neck nodes or sinus tenderness  Nose - normal and patent, no erythema, discharge or polyps  Mouth - mucous membranes moist, pharynx normal without lesions  Neck - supple, no significant adenopathy   Chest - clear to auscultation, no wheezes, rales or rhonchi, symmetric air entry   Heart - normal rate, regular rhythm, normal S1, S2, no murmurs, rubs, clicks or gallops   Abdomen - soft, nontender, nondistended, no masses or organomegaly  Lymph- no adenopathy palpable  Ext-peripheral pulses normal, no pedal edema, no clubbing or cyanosis  Skin-Warm and dry. no hyperpigmentation, vitiligo, or suspicious lesions  Neuro -alert, oriented, normal speech, no focal findings or movement disorder noted      Assessment/Plan:  Diagnoses and all orders for this visit:    1. Transition of care performed with sharing of clinical summary    2. Hemispheric carotid artery syndrome          ICD-10-CM ICD-9-CM    1. Transition of care performed with sharing of clinical summary Z91.89 V15.89    2. Hemispheric carotid artery syndrome G45.1 435.8    Plan:    Patient symptoms appear to have subsided. He has no focal findings on exam today. His speech is normal.  However I am concerned that he does have a history of hoarseness with fatigue and sometimes gets slurred words with fatigue which would raise the question of some other etiology.   He does have follow-up with neurology and I have asked him to address this with them at that time.  Otherwise will plan follow-up in 6 months and reevaluate. If his symptoms do recur or progress return to clinic for evaluation. Follow-up Disposition:  Return for follow up, as scheduled. I have reviewed with the patient details of the assessment and plan and all questions were answered. Relevent patient education was performed. Verbal and/or written instructions (see AVS) provided. The most recent lab findings were reviewed with the patient. Plan was discussed with patient who verbally expressed understanding. An After Visit Summary was printed and given to the patient.     Justen Dee MD

## 2017-12-08 NOTE — PROGRESS NOTES
Identified pt with two pt identifiers(name and ). Reviewed record in preparation for visit and have obtained necessary documentation. Chief Complaint   Patient presents with   Silvio admitted for TIA on 2017 to 2017; Room 8        Health Maintenance Due   Topic    DTaP/Tdap/Td series (1 - Tdap)    ZOSTER VACCINE AGE 60>     GLAUCOMA SCREENING Q2Y     MEDICARE YEARLY EXAM     Pneumococcal 65+ Low/Medium Risk (2 of 2 - PPSV23)       Coordination of Care Questionnaire:  :   1) Have you been to an emergency room, urgent care clinic since your last visit? yes [facility] for TIA on 2017  Hospitalized since your last visit? yes   For 2 days         2. Have seen or consulted any other health care provider since your last visit? NO  If yes, where when, and reason for visit? 3) Do you have an Advanced Directive/ Living Will in place? NO  If yes, do we have a copy on file NO  If no, would you like information NO    Patient is accompanied by self I have received verbal consent from Elbert Levi to discuss any/all medical information while they are present in the room.

## 2017-12-20 ENCOUNTER — OFFICE VISIT (OUTPATIENT)
Dept: NEUROLOGY | Age: 82
End: 2017-12-20

## 2017-12-20 VITALS
HEART RATE: 87 BPM | DIASTOLIC BLOOD PRESSURE: 68 MMHG | HEIGHT: 69 IN | WEIGHT: 194 LBS | BODY MASS INDEX: 28.73 KG/M2 | SYSTOLIC BLOOD PRESSURE: 122 MMHG | OXYGEN SATURATION: 97 %

## 2017-12-20 DIAGNOSIS — I65.22 LEFT CAROTID STENOSIS: ICD-10-CM

## 2017-12-20 DIAGNOSIS — I63.9 CEREBROVASCULAR ACCIDENT (CVA), UNSPECIFIED MECHANISM (HCC): Primary | ICD-10-CM

## 2017-12-20 DIAGNOSIS — E78.5 DYSLIPIDEMIA, GOAL LDL BELOW 70: ICD-10-CM

## 2017-12-20 NOTE — LETTER
12/20/2017 1:46 PM 
 
Patient:    Ori Harry YOB: 1935 Date of Visit:    12/20/2017 Dear Jeff Kathleen MD 
 
Thank you for referring Mr. Ori Harry to me for evaluation/treatment. Below are the relevant portions of my assessment and plan of care. Neurology follow-up note 12/20/17 Chief Complaint Patient presents with  Follow-up SUBJECTIVE Ori Harry is a 80 y.o. male who presented to the neurology office for management of stroke, hyperlipidemia and carotid stenosis. To recap,  on 11/27/2017 he was going to the botanical garden to help out with the lights. On the way over there she was not able to get the words out when he was talking with his friend. He came home and the wife noticed the same. He knew what he wanted to say but was not able to express it. This lasted around 6 hours and then his symptoms resolved. No new symptoms. Current Outpatient Prescriptions Medication Sig  
 aspirin 81 mg chewable tablet Take 1 Tab by mouth daily.  atorvastatin (LIPITOR) 40 mg tablet Take 1 Tab by mouth nightly.  diphenhydrAMINE (BENADRYL) 25 mg capsule Take 25 mg by mouth every six (6) hours as needed for Itching or Skin Irritation.  hydrochlorothiazide (HYDRODIURIL) 25 mg tablet Take 25 mg by mouth daily. Indications: HYPERTENSION No current facility-administered medications for this visit. Past Medical History:  
Diagnosis Date  Acute gout of right foot 11/1/2017  Allergic rhinitis 11/1/2017 Impression: trial of otc Zyrtec  Arthralgia 11/1/2017 Impression: left hip, possibly referred from spine  Arthritis 11/1/2017  Back pain 11/1/2017  Chest pain 11/1/2017 Impression: left shoulder/arm pain ?anginal equivalent  Cough 11/1/2017 Impression: suspect medication related, will follow  Fatigue 11/1/2017  High cholesterol  HTN (hypertension) 11/1/2017 Impression: stable on HCTZ  Hypertension   
 intermittent  Hypertriglyceridemia 11/1/2017  Insomnia 11/1/2017  LBBB (left bundle branch block)  Nasal polyp 11/1/2017  Prostate cancer screening 11/1/2017  Rash 11/1/2017  Stroke ([facility] [address]  Wrist pain, acute, left 11/1/2017 Impression: mostly resolved, pt concerned this was heart related, more likely musculoskeletal or neuropathic, observe, if increases/persists follow up Past Surgical History:  
Procedure Laterality Date  HX CIRCUMCISION  2013  HX MOHS PROCEDURES Left  NEUROLOGICAL PROCEDURE UNLISTED \"pinched nerve neck\" Family History Problem Relation Age of Onset  Dementia Mother  Heart Disease Father Social History Substance Use Topics  Smoking status: Former Smoker  Smokeless tobacco: Never Used  Alcohol use Yes Comment: 1-2 beers per month REVIEW OF SYSTEMS:  
A ten system review of constitutional, cardiovascular, respiratory, musculoskeletal, endocrine, skin, SHEENT, genitourinary, psychiatric and neurologic systems was obtained and is unremarkable except stroke EXAMINATION:  
Visit Vitals  /68  Pulse 87  
 Ht 5' 9\" (1.753 m)  Wt 194 lb (88 kg)  SpO2 97%  BMI 28.65 kg/m2 General:  
General appearance: Pt is in no acute distress Distal pulses are preserved Neurological Examination:  
Mental Status: AAO x3. Speech is fluent. Follows commands, has normal fund of knowledge, attention, short term recall, comprehension and insight. Cranial Nerves: Visual fields are full. PERRL, Extraocular movements are full. Facial sensation intact V1- V3. Facial movement intact, symmetric. Hearing intact to conversation. Palate elevates symmetrically. Shoulder shrug symmetric. Tongue midline. Motor: Strength is 5/5 in all 4 ext. Sensation: Normal to light touch Coordination/Cerebellar: Intact to finger-nose-finger Skin: No significant bruising or lacerations. Laboratory review:  
Results for orders placed or performed during the hospital encounter of 11/27/17 CBC WITH AUTOMATED DIFF Result Value Ref Range WBC 6.2 4.1 - 11.1 K/uL  
 RBC 4.96 4.10 - 5.70 M/uL  
 HGB 15.3 12.1 - 17.0 g/dL HCT 44.0 36.6 - 50.3 % MCV 88.7 80.0 - 99.0 FL  
 MCH 30.8 26.0 - 34.0 PG  
 MCHC 34.8 30.0 - 36.5 g/dL  
 RDW 13.2 11.5 - 14.5 % PLATELET 088 941 - 207 K/uL NEUTROPHILS 59 32 - 75 % LYMPHOCYTES 29 12 - 49 % MONOCYTES 6 5 - 13 % EOSINOPHILS 5 0 - 7 % BASOPHILS 1 0 - 1 %  
 ABS. NEUTROPHILS 3.7 1.8 - 8.0 K/UL  
 ABS. LYMPHOCYTES 1.8 0.8 - 3.5 K/UL  
 ABS. MONOCYTES 0.4 0.0 - 1.0 K/UL  
 ABS. EOSINOPHILS 0.3 0.0 - 0.4 K/UL  
 ABS. BASOPHILS 0.0 0.0 - 0.1 K/UL METABOLIC PANEL, COMPREHENSIVE Result Value Ref Range Sodium 137 136 - 145 mmol/L Potassium 3.9 3.5 - 5.1 mmol/L Chloride 102 97 - 108 mmol/L  
 CO2 29 21 - 32 mmol/L Anion gap 6 5 - 15 mmol/L Glucose 106 (H) 65 - 100 mg/dL BUN 19 6 - 20 MG/DL Creatinine 1.17 0.70 - 1.30 MG/DL  
 BUN/Creatinine ratio 16 12 - 20 GFR est AA >60 >60 ml/min/1.73m2 GFR est non-AA 60 (L) >60 ml/min/1.73m2 Calcium 8.9 8.5 - 10.1 MG/DL Bilirubin, total 0.6 0.2 - 1.0 MG/DL  
 ALT (SGPT) 27 12 - 78 U/L  
 AST (SGOT) 17 15 - 37 U/L Alk. phosphatase 84 45 - 117 U/L Protein, total 7.7 6.4 - 8.2 g/dL Albumin 3.7 3.5 - 5.0 g/dL Globulin 4.0 2.0 - 4.0 g/dL A-G Ratio 0.9 (L) 1.1 - 2.2 CK W/ CKMB & INDEX Result Value Ref Range  39 - 308 U/L  
 CK - MB 2.2 <3.6 NG/ML  
 CK-MB Index 1.9 0 - 2.5 PROTHROMBIN TIME + INR Result Value Ref Range INR 1.0 0.9 - 1.1 Prothrombin time 10.1 9.0 - 11.1 sec TROPONIN I Result Value Ref Range Troponin-I, Qt. <0.04 <0.05 ng/mL URINALYSIS W/MICROSCOPIC Result Value Ref Range Color YELLOW/STRAW Appearance CLEAR CLEAR Specific gravity 1.013 1.003 - 1.030    
 pH (UA) 6.0 5.0 - 8.0 Protein NEGATIVE  NEG mg/dL Glucose NEGATIVE  NEG mg/dL Ketone NEGATIVE  NEG mg/dL Bilirubin NEGATIVE  NEG Blood NEGATIVE  NEG Urobilinogen 0.2 0.2 - 1.0 EU/dL Nitrites NEGATIVE  NEG Leukocyte Esterase NEGATIVE  NEG    
 WBC 0-4 0 - 4 /hpf  
 RBC 0-5 0 - 5 /hpf Epithelial cells FEW FEW /lpf Bacteria NEGATIVE  NEG /hpf Hyaline cast 0-2 0 - 5 /lpf METABOLIC PANEL, BASIC Result Value Ref Range Sodium 137 136 - 145 mmol/L Potassium 4.0 3.5 - 5.1 mmol/L Chloride 102 97 - 108 mmol/L  
 CO2 29 21 - 32 mmol/L Anion gap 6 5 - 15 mmol/L Glucose 105 (H) 65 - 100 mg/dL BUN 18 6 - 20 MG/DL Creatinine 1.16 0.70 - 1.30 MG/DL  
 BUN/Creatinine ratio 16 12 - 20 GFR est AA >60 >60 ml/min/1.73m2 GFR est non-AA >60 >60 ml/min/1.73m2 Calcium 8.7 8.5 - 10.1 MG/DL  
CBC WITH AUTOMATED DIFF Result Value Ref Range WBC 6.3 4.1 - 11.1 K/uL  
 RBC 5.13 4.10 - 5.70 M/uL  
 HGB 16.0 12.1 - 17.0 g/dL HCT 45.8 36.6 - 50.3 % MCV 89.3 80.0 - 99.0 FL  
 MCH 31.2 26.0 - 34.0 PG  
 MCHC 34.9 30.0 - 36.5 g/dL  
 RDW 13.3 11.5 - 14.5 % PLATELET 465 073 - 618 K/uL NEUTROPHILS 57 32 - 75 % LYMPHOCYTES 31 12 - 49 % MONOCYTES 6 5 - 13 % EOSINOPHILS 6 0 - 7 % BASOPHILS 0 0 - 1 %  
 ABS. NEUTROPHILS 3.6 1.8 - 8.0 K/UL  
 ABS. LYMPHOCYTES 2.0 0.8 - 3.5 K/UL  
 ABS. MONOCYTES 0.4 0.0 - 1.0 K/UL  
 ABS. EOSINOPHILS 0.4 0.0 - 0.4 K/UL  
 ABS. BASOPHILS 0.0 0.0 - 0.1 K/UL HEMOGLOBIN A1C WITH EAG Result Value Ref Range Hemoglobin A1c 5.6 4.2 - 6.3 % Est. average glucose 114 mg/dL LIPID PANEL Result Value Ref Range LIPID PROFILE Cholesterol, total 216 (H) <200 MG/DL Triglyceride 183 (H) <150 MG/DL  
 HDL Cholesterol 40 MG/DL  
 LDL, calculated 139.4 (H) 0 - 100 MG/DL VLDL, calculated 36.6 MG/DL  
 CHOL/HDL Ratio 5.4 (H) 0 - 5.0 TSH 3RD GENERATION Result Value Ref Range TSH 1.66 0.36 - 3.74 uIU/mL EKG, 12 LEAD, INITIAL Result Value Ref Range Ventricular Rate 63 BPM  
 Atrial Rate 63 BPM  
 P-R Interval 172 ms QRS Duration 154 ms Q-T Interval 470 ms QTC Calculation (Bezet) 480 ms Calculated P Axis 28 degrees Calculated R Axis -15 degrees Calculated T Axis 130 degrees Diagnosis Normal sinus rhythm Left bundle branch block No previous ECGs available Confirmed by ANIVAL Franco ([id number]) on 11/27/2017 6:17:13 PM 
  
 
 
Imaging review: 
11/27/2017 CT scan of the head without contrast 
No acute changes Carotid ultrasound Less than 50% stenosis of right ICA 
50-69% stenosis of left ICA Transthoracic echo Ejection fraction 55-60%  and HbA1c 5.6 Documentation review: 
None Assessment/Plan: 1. Cerebrovascular accident (CVA), unspecified mechanism ([address] Continue aspirin 81 mg a day. No new strokelike symptoms. 2. Left carotid stenosis Patient has left carotid stenosis 50-69%. Will repeat Carotid scan in 6 months 3. Dyslipidemia, goal LDL below 70 Patient's LDL was elevated. Now on atorvastatin 40 mg daily. Goal LDL is less than 70 ICD-10-CM ICD-9-CM 1. Cerebrovascular accident (CVA), unspecified mechanism ([address] I63.9 434.91   
2. Left carotid stenosis I65.22 433.10   
3. Dyslipidemia, goal LDL below 70 E78.5 [address] Der[address] MD 
Neurologist 
 
CC: Katya Gill MD 
Fax: [phone] This note was created using voice recognition software. Despite editing, there may be syntax errors. This note will not be viewable in [address] If you have questions, please do not hesitate to call me. I look forward to following Mr. Anand Littlejohn along with you. Sincerely, Derian Way MD

## 2017-12-20 NOTE — PATIENT INSTRUCTIONS
Follow-up in 6 months    Please be advised there is a $25 fee for all paperwork to be completed from our  providers. This is to be paid by the patient prior to picking up the completed forms. A Healthy Lifestyle: Care Instructions  Your Care Instructions    A healthy lifestyle can help you feel good, stay at a healthy weight, and have plenty of energy for both work and play. A healthy lifestyle is something you can share with your whole family. A healthy lifestyle also can lower your risk for serious health problems, such as high blood pressure, heart disease, and diabetes. You can follow a few steps listed below to improve your health and the health of your family. Follow-up care is a key part of your treatment and safety. Be sure to make and go to all appointments, and call your doctor if you are having problems. It's also a good idea to know your test results and keep a list of the medicines you take. How can you care for yourself at home? · Do not eat too much sugar, fat, or fast foods. You can still have dessert and treats now and then. The goal is moderation. · Start small to improve your eating habits. Pay attention to portion sizes, drink less juice and soda pop, and eat more fruits and vegetables. ¨ Eat a healthy amount of food. A 3-ounce serving of meat, for example, is about the size of a deck of cards. Fill the rest of your plate with vegetables and whole grains. ¨ Limit the amount of soda and sports drinks you have every day. Drink more water when you are thirsty. ¨ Eat at least 5 servings of fruits and vegetables every day. It may seem like a lot, but it is not hard to reach this goal. A serving or helping is 1 piece of fruit, 1 cup of vegetables, or 2 cups of leafy, raw vegetables. Have an apple or some carrot sticks as an afternoon snack instead of a candy bar. Try to have fruits and/or vegetables at every meal.  · Make exercise part of your daily routine.  You may want to start with simple activities, such as walking, bicycling, or slow swimming. Try to be active 30 to 60 minutes every day. You do not need to do all 30 to 60 minutes all at once. For example, you can exercise 3 times a day for 10 or 20 minutes. Moderate exercise is safe for most people, but it is always a good idea to talk to your doctor before starting an exercise program.  · Keep moving. Joanne  the lawn, work in the garden, or Novitaz. Take the stairs instead of the elevator at work. · If you smoke, quit. People who smoke have an increased risk for heart attack, stroke, cancer, and other lung illnesses. Quitting is hard, but there are ways to boost your chance of quitting tobacco for good. ¨ Use nicotine gum, patches, or lozenges. ¨ Ask your doctor about stop-smoking programs and medicines. ¨ Keep trying. In addition to reducing your risk of diseases in the future, you will notice some benefits soon after you stop using tobacco. If you have shortness of breath or asthma symptoms, they will likely get better within a few weeks after you quit. · Limit how much alcohol you drink. Moderate amounts of alcohol (up to 2 drinks a day for men, 1 drink a day for women) are okay. But drinking too much can lead to liver problems, high blood pressure, and other health problems. Family health  If you have a family, there are many things you can do together to improve your health. · Eat meals together as a family as often as possible. · Eat healthy foods. This includes fruits, vegetables, lean meats and dairy, and whole grains. · Include your family in your fitness plan. Most people think of activities such as jogging or tennis as the way to fitness, but there are many ways you and your family can be more active. Anything that makes you breathe hard and gets your heart pumping is exercise. Here are some tips:  ¨ Walk to do errands or to take your child to school or the bus.   ¨ Go for a family bike ride after dinner instead of watching TV. Where can you learn more? Go to http://vannessa-sameer.info/. Enter U553 in the search box to learn more about \"A Healthy Lifestyle: Care Instructions. \"  Current as of: May 12, 2017  Content Version: 11.4  © 6155-0307 [employer], [employer]. Care instructions adapted under license by [employer] (which disclaims liability or warranty for this information). If you have questions about a medical condition or this instruction, always ask your healthcare professional. [address] any warranty or liability for your use of this information.

## 2017-12-20 NOTE — MR AVS SNAPSHOT
Visit Information Date & Time Provider Department Dept. Phone Encounter #  
 12/20/2017  1:20 PM Tereso Cárdenas MD Neurology [facility] at [facility] [phone] [id number] Your Appointments 4/10/2018 10:10 AM  
FOLLOW UP 10 with MD ZACH Dennis [facility] ([address]) Appt Note: 4M  
 [address] [address] [address] [facility] [address] Upcoming Health Maintenance Date Due DTaP/Tdap/Td series (1 - Tdap) 12/3/1956 ZOSTER VACCINE AGE 60> 10/3/1995 GLAUCOMA SCREENING Q2Y 12/3/2000 MEDICARE YEARLY EXAM 12/3/2000 Pneumococcal 65+ Low/Medium Risk (2 of 2 - PPSV23) 1/1/2016 Allergies as of 12/20/2017  Review Complete On: 12/20/2017 By: Tereso Cárdenas MD  
  
 Severity Noted Reaction Type Reactions Hydrocodone  12/01/2014   Intolerance Other (comments)  
 jittery Lisinopril  12/01/2014   Intolerance Cough Current Immunizations  Reviewed on 9/18/2017 Name Date Influenza High Dose Vaccine PF 9/15/2017 Pneumococcal Vaccine (Unspecified Type) 1/1/2011 Not reviewed this visit You Were Diagnosed With   
  
 Codes Comments Cerebrovascular accident (CVA), unspecified mechanism ([facility]    -  Primary ICD-10-CM: I63.9 ICD-9-CM: 434.91 Left carotid stenosis     ICD-10-CM: K23.88 
ICD-9-CM: 433.10 Dyslipidemia, goal LDL below 70     ICD-10-CM: E78.5 ICD-9-CM: 272.4 Vitals BP Pulse Height(growth percentile) Weight(growth percentile) SpO2 BMI  
 122/68 87 5' 9\" (1.753 m) 194 lb (88 kg) 97% 28.65 kg/m2 Smoking Status Former Smoker BMI and BSA Data Body Mass Index Body Surface Area  
 28.65 kg/m 2 2.07 m 2 Preferred Pharmacy Pharmacy Name Phone [facility], [address] [place] [address]. [phone] Your Updated Medication List  
  
   
This list is accurate as of: 12/20/17  1:45 PM.  Always use your most recent med list.  
  
  
  
  
 aspirin 81 mg chewable tablet Take 1 Tab by mouth daily. atorvastatin 40 mg tablet Commonly known as:  LIPITOR Take 1 Tab by mouth nightly. BENADRYL 25 mg capsule Generic drug:  diphenhydrAMINE Take 25 mg by mouth every six (6) hours as needed for Itching or Skin Irritation. hydroCHLOROthiazide 25 mg tablet Commonly known as:  HYDRODIURIL Take 25 mg by mouth daily. Indications: HYPERTENSION Patient Instructions Follow-up in 6 months Please be advised there is a $25 fee for all paperwork to be completed from our  providers. This is to be paid by the patient prior to picking up the completed forms. A Healthy Lifestyle: Care Instructions Your Care Instructions A healthy lifestyle can help you feel good, stay at a healthy weight, and have plenty of energy for both work and play. A healthy lifestyle is something you can share with your whole family. A healthy lifestyle also can lower your risk for serious health problems, such as high blood pressure, heart disease, and diabetes. You can follow a few steps listed below to improve your health and the health of your family. Follow-up care is a key part of your treatment and safety. Be sure to make and go to all appointments, and call your doctor if you are having problems. It's also a good idea to know your test results and keep a list of the medicines you take. How can you care for yourself at home? · Do not eat too much sugar, fat, or fast foods. You can still have dessert and treats now and then. The goal is moderation. · Start small to improve your eating habits. Pay attention to portion sizes, drink less juice and soda pop, and eat more fruits and vegetables. ¨ Eat a healthy amount of food.  A 3-ounce serving of meat, for example, is about the size of a deck of cards. Fill the rest of your plate with vegetables and whole grains. ¨ Limit the amount of soda and sports drinks you have every day. Drink more water when you are thirsty. ¨ Eat at least 5 servings of fruits and vegetables every day. It may seem like a lot, but it is not hard to reach this goal. A serving or helping is 1 piece of fruit, 1 cup of vegetables, or 2 cups of leafy, raw vegetables. Have an apple or some carrot sticks as an afternoon snack instead of a candy bar. Try to have fruits and/or vegetables at every meal. 
· Make exercise part of your daily routine. You may want to start with simple activities, such as walking, bicycling, or slow swimming. Try to be active 30 to 60 minutes every day. You do not need to do all 30 to 60 minutes all at once. For example, you can exercise 3 times a day for 10 or 20 minutes. Moderate exercise is safe for most people, but it is always a good idea to talk to your doctor before starting an exercise program. 
· Keep moving. Samson [employer]s the lawn, work in the garden, or [employer]. Take the stairs instead of the elevator at work. · If you smoke, quit. People who smoke have an increased risk for heart attack, stroke, cancer, and other lung illnesses. Quitting is hard, but there are ways to boost your chance of quitting tobacco for good. ¨ Use nicotine gum, patches, or lozenges. ¨ Ask your doctor about stop-smoking programs and medicines. ¨ Keep trying. In addition to reducing your risk of diseases in the future, you will notice some benefits soon after you stop using tobacco. If you have shortness of breath or asthma symptoms, they will likely get better within a few weeks after you quit. · Limit how much alcohol you drink. Moderate amounts of alcohol (up to 2 drinks a day for men, 1 drink a day for women) are okay. But drinking too much can lead to liver problems, high blood pressure, and other health problems. Family health If you have a family, there are many things you can do together to improve your health. · Eat meals together as a family as often as possible. · Eat healthy foods. This includes fruits, vegetables, lean meats and dairy, and whole grains. · Include your family in your fitness plan. Most people think of activities such as jogging or tennis as the way to fitness, but there are many ways you and your family can be more active. Anything that makes you breathe hard and gets your heart pumping is exercise. Here are some tips: 
¨ Walk to do errands or to take your child to school or the bus. ¨ Go for a family bike ride after dinner instead of watching TV. Where can you learn more? Go to http://vannessa-sameer.info/. Enter S555 in the search box to learn more about \"A Healthy Lifestyle: Care Instructions. \" Current as of: May 12, 2017 Content Version: 11.4 © 8431-9663 [employer]. Care instructions adapted under license by [employer] (which disclaims liability or warranty for this information). If you have questions about a medical condition or this instruction, always ask your healthcare professional. [place] [address] any warranty or liability for your use of this information. Introducing hospitals & HEALTH SERVICES! Jayme Wick introduces [facility] patient portal. Now you can access parts of your medical record, email your doctor's office, and request medication refills online. 1. In your internet browser, go to [email or web address] 2. Click on the First Time User? Click Here link in the Sign In box. You will see the New Member Sign Up page. 3. Enter your [facility] Access Code exactly as it appears below. You will not need to use this code after youve completed the sign-up process. If you do not sign up before the expiration date, you must request a new code. · [facility] Access Code: [email or web address] Expires: 2/25/2018  9:57 AM 
 
 4. Enter the last four digits of your Social Security Number (xxxx) and Date of Birth (mm/dd/yyyy) as indicated and click Submit. You will be taken to the next sign-up page. 5. Create a TIMPIK ID. This will be your TIMPIK login ID and cannot be changed, so think of one that is secure and easy to remember. 6. Create a TIMPIK password. You can change your password at any time. 7. Enter your Password Reset Question and Answer. This can be used at a later time if you forget your password. 8. Enter your e-mail address. You will receive e-mail notification when new information is available in [address] 9. Click Sign Up. You can now view and download portions of your medical record. 10. Click the Download Summary menu link to download a portable copy of your medical information. If you have questions, please visit the Frequently Asked Questions section of the TIM[employer]K website. Remember, TIM[employer]K is NOT to be used for urgent needs. For medical emergencies, dial 911. Now available from your iPhone and Android! Please provide this summary of care documentation to your next provider. Your primary care clinician is listed as LEYLA Haines. If you have any questions after today's visit, please call [phone].

## 2017-12-20 NOTE — PROGRESS NOTES
Neurology follow-up note    12/20/17    Chief Complaint   Patient presents with   Shayy Moya is a 80 y.o. male who presented to the neurology office for management of stroke, hyperlipidemia and carotid stenosis. To recap,  on 11/27/2017 he was going to the botanical garden to help out with the lights. On the way over there she was not able to get the words out when he was talking with his friend. He came home and the wife noticed the same. He knew what he wanted to say but was not able to express it. This lasted around 6 hours and then his symptoms resolved. No new symptoms. Current Outpatient Prescriptions   Medication Sig    aspirin 81 mg chewable tablet Take 1 Tab by mouth daily.  atorvastatin (LIPITOR) 40 mg tablet Take 1 Tab by mouth nightly.  diphenhydrAMINE (BENADRYL) 25 mg capsule Take 25 mg by mouth every six (6) hours as needed for Itching or Skin Irritation.  hydrochlorothiazide (HYDRODIURIL) 25 mg tablet Take 25 mg by mouth daily. Indications: HYPERTENSION     No current facility-administered medications for this visit.         Past Medical History:   Diagnosis Date    Acute gout of right foot 11/1/2017    Allergic rhinitis 11/1/2017    Impression: trial of otc Zyrtec    Arthralgia 11/1/2017    Impression: left hip, possibly referred from spine    Arthritis 11/1/2017    Back pain 11/1/2017    Chest pain 11/1/2017    Impression: left shoulder/arm pain ?anginal equivalent    Cough 11/1/2017    Impression: suspect medication related, will follow    Fatigue 11/1/2017    High cholesterol     HTN (hypertension) 11/1/2017    Impression: stable on HCTZ    Hypertension     intermittent    Hypertriglyceridemia 11/1/2017    Insomnia 11/1/2017    LBBB (left bundle branch block)     Nasal polyp 11/1/2017    Prostate cancer screening 11/1/2017    Rash 11/1/2017    Stroke ([address]     Wrist pain, acute, left 11/1/2017    Impression: mostly resolved, pt concerned this was heart related, more likely musculoskeletal or neuropathic, observe, if increases/persists follow up     Past Surgical History:   Procedure Laterality Date    HX CIRCUMCISION  2013    HX MOHS PROCEDURES Left     NEUROLOGICAL PROCEDURE UNLISTED      \"pinched nerve neck\"     Family History   Problem Relation Age of Onset    Dementia Mother     Heart Disease Father      Social History   Substance Use Topics    Smoking status: Former Smoker    Smokeless tobacco: Never Used    Alcohol use Yes      Comment: 1-2 beers per month       REVIEW OF SYSTEMS:   A ten system review of constitutional, cardiovascular, respiratory, musculoskeletal, endocrine, skin, SHEENT, genitourinary, psychiatric and neurologic systems was obtained and is unremarkable except stroke    EXAMINATION:   Visit Vitals    /68    Pulse 87    Ht 5' 9\" (1.753 m)    Wt 194 lb (88 kg)    SpO2 97%    BMI 28.65 kg/m2        General:   General appearance: Pt is in no acute distress   Distal pulses are preserved    Neurological Examination:   Mental Status: AAO x3. Speech is fluent. Follows commands, has normal fund of knowledge, attention, short term recall, comprehension and insight. Cranial Nerves: Visual fields are full. PERRL, Extraocular movements are full. Facial sensation intact V1- V3. Facial movement intact, symmetric. Hearing intact to conversation. Palate elevates symmetrically. Shoulder shrug symmetric. Tongue midline. Motor: Strength is 5/5 in all 4 ext. Sensation: Normal to light touch    Coordination/Cerebellar: Intact to finger-nose-finger     Skin: No significant bruising or lacerations.     Laboratory review:   Results for orders placed or performed during the hospital encounter of 11/27/17   CBC WITH AUTOMATED DIFF   Result Value Ref Range    WBC 6.2 4.1 - 11.1 K/uL    RBC 4.96 4.10 - 5.70 M/uL    HGB 15.3 12.1 - 17.0 g/dL    HCT 44.0 36.6 - 50.3 %    MCV 88.7 80.0 - 99.0 FL    MCH 30.8 26.0 - 34.0 PG    MCHC 34.8 30.0 - 36.5 g/dL    RDW 13.2 11.5 - 14.5 %    PLATELET 855 502 - 563 K/uL    NEUTROPHILS 59 32 - 75 %    LYMPHOCYTES 29 12 - 49 %    MONOCYTES 6 5 - 13 %    EOSINOPHILS 5 0 - 7 %    BASOPHILS 1 0 - 1 %    ABS. NEUTROPHILS 3.7 1.8 - 8.0 K/UL    ABS. LYMPHOCYTES 1.8 0.8 - 3.5 K/UL    ABS. MONOCYTES 0.4 0.0 - 1.0 K/UL    ABS. EOSINOPHILS 0.3 0.0 - 0.4 K/UL    ABS. BASOPHILS 0.0 0.0 - 0.1 K/UL   METABOLIC PANEL, COMPREHENSIVE   Result Value Ref Range    Sodium 137 136 - 145 mmol/L    Potassium 3.9 3.5 - 5.1 mmol/L    Chloride 102 97 - 108 mmol/L    CO2 29 21 - 32 mmol/L    Anion gap 6 5 - 15 mmol/L    Glucose 106 (H) 65 - 100 mg/dL    BUN 19 6 - 20 MG/DL    Creatinine 1.17 0.70 - 1.30 MG/DL    BUN/Creatinine ratio 16 12 - 20      GFR est AA >60 >60 ml/min/1.73m2    GFR est non-AA 60 (L) >60 ml/min/1.73m2    Calcium 8.9 8.5 - 10.1 MG/DL    Bilirubin, total 0.6 0.2 - 1.0 MG/DL    ALT (SGPT) 27 12 - 78 U/L    AST (SGOT) 17 15 - 37 U/L    Alk.  phosphatase 84 45 - 117 U/L    Protein, total 7.7 6.4 - 8.2 g/dL    Albumin 3.7 3.5 - 5.0 g/dL    Globulin 4.0 2.0 - 4.0 g/dL    A-G Ratio 0.9 (L) 1.1 - 2.2     CK W/ CKMB & INDEX   Result Value Ref Range     39 - 308 U/L    CK - MB 2.2 <3.6 NG/ML    CK-MB Index 1.9 0 - 2.5     PROTHROMBIN TIME + INR   Result Value Ref Range    INR 1.0 0.9 - 1.1      Prothrombin time 10.1 9.0 - 11.1 sec   TROPONIN I   Result Value Ref Range    Troponin-I, Qt. <0.04 <0.05 ng/mL   URINALYSIS W/MICROSCOPIC   Result Value Ref Range    Color YELLOW/STRAW      Appearance CLEAR CLEAR      Specific gravity 1.013 1.003 - 1.030      pH (UA) 6.0 5.0 - 8.0      Protein NEGATIVE  NEG mg/dL    Glucose NEGATIVE  NEG mg/dL    Ketone NEGATIVE  NEG mg/dL    Bilirubin NEGATIVE  NEG      Blood NEGATIVE  NEG      Urobilinogen 0.2 0.2 - 1.0 EU/dL    Nitrites NEGATIVE  NEG      Leukocyte Esterase NEGATIVE  NEG      WBC 0-4 0 - 4 /hpf    RBC 0-5 0 - 5 /hpf    Epithelial cells FEW FEW /lpf    Bacteria NEGATIVE  NEG /hpf    Hyaline cast 0-2 0 - 5 /lpf   METABOLIC PANEL, BASIC   Result Value Ref Range    Sodium 137 136 - 145 mmol/L    Potassium 4.0 3.5 - 5.1 mmol/L    Chloride 102 97 - 108 mmol/L    CO2 29 21 - 32 mmol/L    Anion gap 6 5 - 15 mmol/L    Glucose 105 (H) 65 - 100 mg/dL    BUN 18 6 - 20 MG/DL    Creatinine 1.16 0.70 - 1.30 MG/DL    BUN/Creatinine ratio 16 12 - 20      GFR est AA >60 >60 ml/min/1.73m2    GFR est non-AA >60 >60 ml/min/1.73m2    Calcium 8.7 8.5 - 10.1 MG/DL   CBC WITH AUTOMATED DIFF   Result Value Ref Range    WBC 6.3 4.1 - 11.1 K/uL    RBC 5.13 4.10 - 5.70 M/uL    HGB 16.0 12.1 - 17.0 g/dL    HCT 45.8 36.6 - 50.3 %    MCV 89.3 80.0 - 99.0 FL    MCH 31.2 26.0 - 34.0 PG    MCHC 34.9 30.0 - 36.5 g/dL    RDW 13.3 11.5 - 14.5 %    PLATELET 034 935 - 136 K/uL    NEUTROPHILS 57 32 - 75 %    LYMPHOCYTES 31 12 - 49 %    MONOCYTES 6 5 - 13 %    EOSINOPHILS 6 0 - 7 %    BASOPHILS 0 0 - 1 %    ABS. NEUTROPHILS 3.6 1.8 - 8.0 K/UL    ABS. LYMPHOCYTES 2.0 0.8 - 3.5 K/UL    ABS. MONOCYTES 0.4 0.0 - 1.0 K/UL    ABS. EOSINOPHILS 0.4 0.0 - 0.4 K/UL    ABS.  BASOPHILS 0.0 0.0 - 0.1 K/UL   HEMOGLOBIN A1C WITH EAG   Result Value Ref Range    Hemoglobin A1c 5.6 4.2 - 6.3 %    Est. average glucose 114 mg/dL   LIPID PANEL   Result Value Ref Range    LIPID PROFILE          Cholesterol, total 216 (H) <200 MG/DL    Triglyceride 183 (H) <150 MG/DL    HDL Cholesterol 40 MG/DL    LDL, calculated 139.4 (H) 0 - 100 MG/DL    VLDL, calculated 36.6 MG/DL    CHOL/HDL Ratio 5.4 (H) 0 - 5.0     TSH 3RD GENERATION   Result Value Ref Range    TSH 1.66 0.36 - 3.74 uIU/mL   EKG, 12 LEAD, INITIAL   Result Value Ref Range    Ventricular Rate 63 BPM    Atrial Rate 63 BPM    P-R Interval 172 ms    QRS Duration 154 ms    Q-T Interval 470 ms    QTC Calculation (Bezet) 480 ms    Calculated P Axis 28 degrees    Calculated R Axis -15 degrees    Calculated T Axis 130 degrees    Diagnosis       Normal sinus rhythm  Left bundle branch block  No previous ECGs available  Confirmed by ANIVAL Ann ([id number]) on 11/27/2017 6:17:13 PM         Imaging review:  11/27/2017  CT scan of the head without contrast  No acute changes    Carotid ultrasound  Less than 50% stenosis of right ICA  50-69% stenosis of left ICA    Transthoracic echo  Ejection fraction 55-60%     and HbA1c 5.6    Documentation review:  None    Assessment/Plan:   1. Cerebrovascular accident (CVA), unspecified mechanism ([address]  Continue aspirin 81 mg a day. No new strokelike symptoms. 2. Left carotid stenosis  Patient has left carotid stenosis 50-69%. Will repeat Carotid scan in 6 months    3. Dyslipidemia, goal LDL below 70  Patient's LDL was elevated. Now on atorvastatin 40 mg daily. Goal LDL is less than 70        ICD-10-CM ICD-9-CM    1. Cerebrovascular accident (CVA), unspecified mechanism ([address] I63.9 434.91    2. Left carotid stenosis I65.22 433.10    3. Dyslipidemia, goal LDL below 70 E78.5 272.4         Artie Guevara MD  Neurologist    CC: Jeff Kathleen MD  Fax: [phone]    This note was created using voice recognition software. Despite editing, there may be syntax errors. This note will not be viewable in [address]

## 2018-01-05 ENCOUNTER — OFFICE VISIT (OUTPATIENT)
Dept: INTERNAL MEDICINE CLINIC | Age: 83
End: 2018-01-05

## 2018-01-05 VITALS
DIASTOLIC BLOOD PRESSURE: 75 MMHG | OXYGEN SATURATION: 97 % | RESPIRATION RATE: 18 BRPM | SYSTOLIC BLOOD PRESSURE: 114 MMHG | HEART RATE: 78 BPM | HEIGHT: 69 IN | BODY MASS INDEX: 29.09 KG/M2 | TEMPERATURE: 98.3 F | WEIGHT: 196.4 LBS

## 2018-01-05 DIAGNOSIS — L30.9 DERMATITIS: ICD-10-CM

## 2018-01-05 DIAGNOSIS — M75.51 BURSITIS OF RIGHT SHOULDER: Primary | ICD-10-CM

## 2018-01-05 RX ORDER — DESOXIMETASONE 2.5 MG/G
CREAM TOPICAL 2 TIMES DAILY
COMMUNITY
End: 2018-06-28

## 2018-01-05 RX ORDER — CEPHALEXIN 500 MG/1
500 CAPSULE ORAL 4 TIMES DAILY
Qty: 40 CAP | Refills: 0 | Status: SHIPPED | OUTPATIENT
Start: 2018-01-05 | End: 2018-01-15

## 2018-01-05 RX ORDER — HYDROCORTISONE VALERATE 2 MG/G
1 CREAM TOPICAL 2 TIMES DAILY
Qty: 45 G | Refills: 0 | Status: SHIPPED | OUTPATIENT
Start: 2018-01-05 | End: 2018-01-15

## 2018-01-05 RX ORDER — LIDOCAINE HYDROCHLORIDE 10 MG/ML
2 INJECTION INFILTRATION; PERINEURAL ONCE
Qty: 1 VIAL | Refills: 0
Start: 2018-01-05 | End: 2018-01-05

## 2018-01-05 NOTE — PROGRESS NOTES
Kashif Acuña is a 80 y.o. male    Chief Complaint   Patient presents with    Insect Bite     volunteers at botanical garden, noticed this nov/dec \"itches and burns\" he tried steroid cream with no relief    Shoulder Pain     \"sore and it keeps me up at night\" \"if they can give me a steroid shot     1. Have you been to the ER, urgent care clinic since your last visit? Hospitalized since your last visit? Yes Where: [facility] Reason for visit: \"TIA\"    2. Have you seen or consulted any other health care providers outside of the [address] [place] [address] since your last visit? Include any pap smears or colon screening.  Alessandra Mattson for  Neurosurgery f/u

## 2018-01-05 NOTE — MR AVS SNAPSHOT
Visit Information Date & Time Provider Department Dept. Phone Encounter #  
 1/5/2018  9:10 AM LEYLA Oliver MD [address] [place] [address] [facility] [phone] [id number] Follow-up Instructions Return if symptoms worsen or fail to improve. Your Appointments 4/10/2018 10:10 AM  
FOLLOW UP 10 with MD TATY Torrez [facility] ([facility] Appt Note: 4M  
 [address] [address] [address] [address]  
  
    
 6/20/2018  1:40 PM  
Follow Up with Michelle Duarte MD  
[facility]urology Clinic at [facility]) Appt Note: FU,CVA,adt,12/20/2017  
 [facility], 
[address], [address] [address]  
[address], [address] [place] [address], [address] [address] Upcoming Health Maintenance Date Due DTaP/Tdap/Td series (1 - Tdap) 12/3/1956 ZOSTER VACCINE AGE 60> 10/3/1995 GLAUCOMA SCREENING Q2Y 12/3/2000 MEDICARE YEARLY EXAM 12/3/2000 Pneumococcal 65+ Low/Medium Risk (2 of 2 - PPSV23) 1/1/2016 Allergies as of 1/5/2018  Review Complete On: 2/6/2978 By: Saleem Key LPN Severity Noted Reaction Type Reactions Hydrocodone  12/01/2014   Intolerance Other (comments)  
 jittery Lisinopril  12/01/2014   Intolerance Cough Current Immunizations  Reviewed on 9/18/2017 Name Date Influenza High Dose Vaccine PF 9/15/2017 Pneumococcal Vaccine (Unspecified Type) 1/1/2011 Not reviewed this visit You Were Diagnosed With   
  
 Codes Comments Bursitis of right shoulder    -  Primary ICD-10-CM: M75.51 
ICD-9-CM: 726.10 Dermatitis     ICD-10-CM: L30.9 ICD-9-CM: 692.9 Vitals BP Pulse Temp Resp Height(growth percentile) Weight(growth percentile)  114/75 (BP 1 Location: Left arm, BP Patient Position: Sitting) 78 98.3 °F (36.8 °C) (Oral) 18 5' 9\" (1.753 m) 196 lb 6.4 oz (89.1 kg) SpO2 BMI Smoking Status 97% 29 kg/m2 Former Smoker Vitals History BMI and BSA Data Body Mass Index Body Surface Area  
 29 kg/m 2 2.08 m 2 Preferred Pharmacy Pharmacy Name Phone [address], [address] [facility]. [phone] Your Updated Medication List  
  
   
This list is accurate as of: 1/5/18 10:22 AM.  Always use your most recent med list.  
  
  
  
  
 aspirin 81 mg chewable tablet Take 1 Tab by mouth daily. atorvastatin 40 mg tablet Commonly known as:  LIPITOR Take 1 Tab by mouth nightly. BENADRYL 25 mg capsule Generic drug:  diphenhydrAMINE Take 25 mg by mouth every six (6) hours as needed for Itching or Skin Irritation. cephALEXin 500 mg capsule Commonly known as:  Lollnaa Clio Take 1 Cap by mouth four (4) times daily for 10 days. desoximetasone 0.25 % topical cream  
Commonly known as:  TOPICORT Apply  to affected area two (2) times a day. hydroCHLOROthiazide 25 mg tablet Commonly known as:  HYDRODIURIL Take 25 mg by mouth daily. Indications: HYPERTENSION  
  
 hydrocortisone valerate 0.2 % topical cream  
Commonly known as:  C[employer]a Apply 1 Each to affected area two (2) times a day for 10 days. use thin layer  
  
 lidocaine 10 mg/mL (1 %) injection Commonly known as:  XYLOCAINE  
2 mL by IntraDERMal route once for 1 dose. Prescriptions Sent to Pharmacy Refills  
 hydrocortisone valerate (WESTCORT) 0.2 % topical cream 0 Sig: Apply 1 Each to affected area two (2) times a day for 10 days. use thin layer Class: Normal  
 Pharmacy: [address], [address] [facility] [address] [facility] [address] Ph #: [phone] Route: Topical  
 cephALEXin (KEFLEX) 500 mg capsule 0 Sig: Take 1 Cap by mouth four (4) times daily for 10 days.   
 Class: Normal  
 Pharmacy: [address], [address] [place] [address] RD.  #: [phone] Route: Oral  
  
We Performed the Following METHYLPREDNISOLONE ACETATE INJECTION 40 MG [ [facility]] Comments:  
 Submit quantity per 40 mg injection ND DRAIN/INJECT LARGE JOINT/BURSA [id number] CPT(R)] Follow-up Instructions Return if symptoms worsen or fail to improve. Introducing [facility] & HEALTH SERVICES! [facility] introduces SocialProof patient portal. Now you can access parts of your medical record, email your doctor's office, and request medication refills online. 1. In your internet browser, go to [email or web address] 2. Click on the First Time User? Click Here link in the Sign In box. You will see the New Member Sign Up page. 3. Enter your SocialProof Access Code exactly as it appears below. You will not need to use this code after youve completed the sign-up process. If you do not sign up before the expiration date, you must request a new code. · SocialProof Access Code: [email or web address] Expires: 2/25/2018  9:57 AM 
 
4. Enter the last four digits of your Social Security Number (xxxx) and Date of Birth (mm/dd/yyyy) as indicated and click Submit. You will be taken to the next sign-up page. 5. Create a SocialProof ID. This will be your SocialProof login ID and cannot be changed, so think of one that is secure and easy to remember. 6. Create a SocialProof password. You can change your password at any time. 7. Enter your Password Reset Question and Answer. This can be used at a later time if you forget your password. 8. Enter your e-mail address. You will receive e-mail notification when new information is available in [address] 9. Click Sign Up. You can now view and download portions of your medical record. 10. Click the Download Summary menu link to download a portable copy of your medical information.  
 
If you have questions, please visit the Frequently Asked Questions section of the [employer]in' Glue. Remember, Swapboxhart is NOT to be used for urgent needs. For medical emergencies, dial 911. Now available from your iPhone and Android! Please provide this summary of care documentation to your next provider. Your primary care clinician is listed as LEYLA Levin. If you have any questions after today's visit, please call [phone].

## 2018-01-08 NOTE — PROGRESS NOTES
This note will not be viewable in [address] Josh Denise is a 80 y.o. male and presents with Insect Bite (volunteers at [employer], noticed this nov/dec \"itches and burns\" he tried steroid cream with no relief) and Shoulder Pain (\"sore and it keeps me up at [facility] they can give me a steroid shot)  . Subjective:  Mr. Carlos Henderson presents today with complaint of a rash on his torso and lower back. It is also present on his arms. He denies any new soaps or detergents. He is tried topical medication steroid cream without relief. He also complains of some right shoulder pain and had a steroid shot previously but has not seen any significant benefit.     Past Medical History:   Diagnosis Date    Acute gout of right foot 11/1/2017    Allergic rhinitis 11/1/2017    Impression: trial of otc Zyrtec    Arthralgia 11/1/2017    Impression: left hip, possibly referred from spine    Arthritis 11/1/2017    Back pain 11/1/2017    Chest pain 11/1/2017    Impression: left shoulder/arm pain ?anginal equivalent    Cough 11/1/2017    Impression: suspect medication related, will follow    Fatigue 11/1/2017    High cholesterol     HTN (hypertension) 11/1/2017    Impression: stable on HCTZ    Hypertension     intermittent    Hypertriglyceridemia 11/1/2017    Insomnia 11/1/2017    LBBB (left bundle branch block)     Nasal polyp 11/1/2017    Prostate cancer screening 11/1/2017    Rash 11/1/2017    Stroke ([address]     Wrist pain, acute, left 11/1/2017    Impression: mostly resolved, pt concerned this was heart related, more likely musculoskeletal or neuropathic, observe, if increases/persists follow up     Past Surgical History:   Procedure Laterality Date    HX CIRCUMCISION  2013    HX MOHS PROCEDURES Left     NEUROLOGICAL PROCEDURE UNLISTED      \"pinched nerve neck\"     Allergies   Allergen Reactions    Hydrocodone Other (comments)     jittery    Lisinopril Cough     Current Outpatient Prescriptions Medication Sig Dispense Refill    desoximetasone (TOPICORT) 0.25 % topical cream Apply  to affected area two (2) times a day.  hydrocortisone valerate (WESTCORT) 0.2 % topical cream Apply 1 Each to affected area two (2) times a day for 10 days. use thin layer 45 g 0    cephALEXin (KEFLEX) 500 mg capsule Take 1 Cap by mouth four (4) times daily for 10 days. 40 Cap 0    aspirin 81 mg chewable tablet Take 1 Tab by mouth daily. 30 Tab 6    atorvastatin (LIPITOR) 40 mg tablet Take 1 Tab by mouth nightly. 30 Tab 6    hydrochlorothiazide (HYDRODIURIL) 25 mg tablet Take 25 mg by mouth daily. Indications: HYPERTENSION      diphenhydrAMINE (BENADRYL) 25 mg capsule Take 25 mg by mouth every six (6) hours as needed for Itching or Skin Irritation. Social History     Social History    Marital status:      Spouse name: N/A    Number of children: N/A    Years of education: N/A     Social History Main Topics    Smoking status: Former Smoker    Smokeless tobacco: Never Used    Alcohol use Yes      Comment: 1-2 beers per month    Drug use: No    Sexual activity: Not Asked     Other Topics Concern    None     Social History Narrative     Family History   Problem Relation Age of Onset    Dementia Mother     Heart Disease Father        Review of Systems  Constitutional:  negative for fevers, chills, anorexia and weight loss  Eyes:    negative for visual disturbance and irritation  ENT:    negative for tinnitus,sore throat,nasal congestion,ear pains. hoarseness  Respiratory:     negative for cough, hemoptysis, dyspnea,wheezing  CV:    negative for chest pain, palpitations, lower extremity edema  GI:    negative for nausea, vomiting, diarrhea, abdominal pain,melena  Endo:               negative for polyuria,polydipsia,polyphagia,heat intolerance  Genitourinary : negative for frequency, dysuria and hematuria  Integumentary: negative for rash and pruritus  Hematologic:   negative for easy bruising and gum/nose bleeding  Musculoskel:  negative for myalgias, arthralgias, back pain, muscle weakness, positive right shoulder pain  Neurological:   negative for headaches, dizziness, vertigo, memory problems and gait   Behavl/Psych:  negative for feelings of anxiety, depression, mood changes  ROS otherwise negative      Objective:  Visit Vitals    /75 (BP 1 Location: Left arm, BP Patient Position: Sitting)    Pulse 78    Temp 98.3 °F (36.8 °C) (Oral)    Resp 18    Ht 5' 9\" (1.753 m)    Wt 196 lb 6.4 oz (89.1 kg)    SpO2 97%    BMI 29 kg/m2     Physical Exam:   General appearance - alert, well appearing, and in no distress  Mental status - alert, oriented to person, place, and time  EYE-KESHIA, EOMI, fundi normal, corneas normal, no foreign bodies  ENT-ENT exam normal, no neck nodes or sinus tenderness  Nose - normal and patent, no erythema, discharge or polyps  Mouth - mucous membranes moist, pharynx normal without lesions  Neck - supple, no significant adenopathy   Chest - clear to auscultation, no wheezes, rales or rhonchi, symmetric air entry   Heart - normal rate, regular rhythm, normal S1, S2, no murmurs, rubs, clicks or gallops   Abdomen - soft, nontender, nondistended, no masses or organomegaly  Lymph- no adenopathy palpable  Ext-peripheral pulses normal, no pedal edema, no clubbing or cyanosis  Skin-Warm and dry. no hyperpigmentation, vitiligo, diffuse mildly erythematous scaly lesion with dry skin involving the arms and back he also has a patch of psoriasis which appears to be quiescent  Neuro -alert, oriented, normal speech, no focal findings or movement disorder noted  Muscular skeletal-right shoulder demonstrates point tenderness over the shoulder consistent with bursitis    Procedure:Right shoulder injected with corticosteroid and 2 cc 1% lidocaine using aseptic technique without complication. Assessment/Plan:  Diagnoses and all orders for this visit:    1.  Bursitis of right shoulder  - (DEPO-MEDROL 40 mg  -   quantity 1 for Reimbursement) METHYLPREDNISOLONE ACETATE 40 mg injection  -     lidocaine (XYLOCAINE) 10 mg/mL (1 %) injection; 2 mL by IntraDERMal route once for 1 dose. -     [address]10 - DRAIN/INJECT LARGE JOINT/BURSA    2. Dermatitis    Other orders  -     hydrocortisone valerate (WESTCORT) 0.2 % topical cream; Apply 1 Each to affected area two (2) times a day for 10 days. use thin layer  -     cephALEXin (KEFLEX) 500 mg capsule; Take 1 Cap by mouth four (4) times daily for 10 days. ICD-10-CM ICD-9-CM    1. Bursitis of right shoulder M75.51 726.10 METHYLPREDNISOLONE ACETATE INJECTION 40 MG      lidocaine (XYLOCAINE) 10 mg/mL (1 %) injection      WA DRAIN/INJECT LARGE JOINT/BURSA   2. Dermatitis L30.9 692.9        Plan:  Right shoulder injected for bursitis as noted above. Suspect eczematous dermatitis with possible superimposed bacterial infection. Start Keflex and topical Westcort cream.  Follow-up if not improved. Follow-up Disposition:  Return if symptoms worsen or fail to improve. I have reviewed with the patient details of the assessment and plan and all questions were answered. Relevent patient education was performed. Verbal and/or written instructions (see AVS) provided. The most recent lab findings were reviewed with the patient. Plan was discussed with patient who verbally expressed understanding. An After Visit Summary was printed and given to the patient.     Misty Michael MD

## 2018-04-10 ENCOUNTER — OFFICE VISIT (OUTPATIENT)
Dept: INTERNAL MEDICINE CLINIC | Age: 83
End: 2018-04-10

## 2018-04-10 VITALS
HEIGHT: 69 IN | SYSTOLIC BLOOD PRESSURE: 135 MMHG | RESPIRATION RATE: 18 BRPM | OXYGEN SATURATION: 96 % | BODY MASS INDEX: 28.64 KG/M2 | HEART RATE: 66 BPM | DIASTOLIC BLOOD PRESSURE: 79 MMHG | TEMPERATURE: 98 F | WEIGHT: 193.4 LBS

## 2018-04-10 DIAGNOSIS — R21 RASH: ICD-10-CM

## 2018-04-10 DIAGNOSIS — I67.9 CEREBROVASCULAR DISEASE: ICD-10-CM

## 2018-04-10 DIAGNOSIS — Z79.899 ON STATIN THERAPY: ICD-10-CM

## 2018-04-10 DIAGNOSIS — I10 HYPERTENSION, UNSPECIFIED TYPE: Primary | ICD-10-CM

## 2018-04-10 DIAGNOSIS — E78.1 HYPERTRIGLYCERIDEMIA: ICD-10-CM

## 2018-04-10 LAB
ALBUMIN SERPL-MCNC: 4.4 G/DL (ref 3.9–5.4)
ALKALINE PHOS POC: 84 U/L (ref 38–126)
ALT SERPL-CCNC: 33 U/L (ref 9–52)
AST SERPL-CCNC: 20 U/L (ref 14–36)
BACTERIA UA POCT, BACTPOCT: NORMAL
BILIRUB UR QL STRIP: NEGATIVE
BUN BLD-MCNC: 18 MG/DL (ref 9–20)
CALCIUM BLD-MCNC: 9.5 MG/DL (ref 8.4–10.2)
CASTS UA POCT: 0
CHLORIDE BLD-SCNC: 99 MMOL/L (ref 98–107)
CHOLEST SERPL-MCNC: 104 MG/DL (ref 0–200)
CK (CPK) POC: 65 U/L (ref 30–135)
CLUE CELLS, CLUEPOCT: NEGATIVE
CO2 POC: 30 MMOL/L (ref 22–32)
CREAT BLD-MCNC: 0.9 MG/DL (ref 0.8–1.5)
CRYSTALS UA POCT, CRYSPOCT: NEGATIVE
EGFR (POC): 79.3
EPITHELIAL CELLS POCT: NEGATIVE
GLUCOSE POC: 102 MG/DL (ref 75–110)
GLUCOSE UR-MCNC: NEGATIVE MG/DL
HDLC SERPL-MCNC: 41 MG/DL (ref 35–130)
KETONES P FAST UR STRIP-MCNC: NEGATIVE MG/DL
LDL CHOLESTEROL POC: 34 MG/DL (ref 0–130)
MUCUS UA POCT, MUCPOCT: NORMAL
PH UR STRIP: 7 [PH] (ref 5–7)
POTASSIUM SERPL-SCNC: 4 MMOL/L (ref 3.6–5)
PROT SERPL-MCNC: 7.6 G/DL (ref 6.3–8.2)
PROT UR QL STRIP: NEGATIVE
RBC UA POCT, RBCPOCT: NORMAL
SODIUM SERPL-SCNC: 140 MMOL/L (ref 137–145)
SP GR UR STRIP: 1.01 (ref 1.01–1.02)
TCHOL/HDL RATIO (POC): 2.5 (ref 0–4)
TOTAL BILIRUBIN POC: 1.2 MG/DL (ref 0.2–1.3)
TRICH UA POCT, TRICHPOC: NEGATIVE
TRIGL SERPL-MCNC: 145 MG/DL (ref 0–200)
UA UROBILINOGEN AMB POC: NORMAL (ref 0.2–1)
URINALYSIS CLARITY POC: CLEAR
URINALYSIS COLOR POC: NORMAL
URINE BLOOD POC: NORMAL
URINE CULT COMMENT, POCT: NORMAL
URINE LEUKOCYTES POC: NEGATIVE
URINE NITRITES POC: NEGATIVE
VLDLC SERPL CALC-MCNC: 29 MG/DL
WBC UA POCT, WBCPOCT: 0
YEAST UA POCT, YEASTPOC: NEGATIVE

## 2018-04-10 RX ORDER — PERMETHRIN 50 MG/G
CREAM TOPICAL
Qty: 60 G | Refills: 0 | Status: SHIPPED | OUTPATIENT
Start: 2018-04-10 | End: 2018-06-28

## 2018-04-10 NOTE — MR AVS SNAPSHOT
[address] [place] [address] [address] [address] [phone] Patient: Radha Knapp MRN: [id number] WEW:[birth year] Visit Information Date & Time Provider Department Dept. Phone Encounter #  
 4/10/2018 10:10 AM LEYLA Urbina MD [address] [facility] [phone] [id number] Follow-up Instructions Return in about 6 months (around 10/10/2018) for [address] [place] [address]. Your Appointments 6/28/2018 10:00 AM  
Follow Up with Rachana Ramirez MD  
Neurology Clinic at [facility]) Appt Note: FU,CVA,adt,12/20/2017; FU,CVA,adt,12/20/2017-tlw 3/15/18  
 [address] [address], [address] [place] [address]  
[address] [facility], [address] [place] [address], [address] [place] [address] [address] [address] Upcoming Health Maintenance Date Due DTaP/Tdap/Td series (1 - Tdap) 12/3/1956 ZOSTER VACCINE AGE 60> 10/3/1995 GLAUCOMA SCREENING Q2Y 12/3/2000 Pneumococcal 65+ Low/Medium Risk (2 of 2 - PPSV23) 1/1/2016 MEDICARE YEARLY EXAM 3/14/2018 Allergies as of 4/10/2018  Review Complete On: 4/10/2018 By: Love Lau MD  
  
 Severity Noted Reaction Type Reactions Hydrocodone  12/01/2014   Intolerance Other (comments)  
 jittery Lisinopril  12/01/2014   Intolerance Cough Current Immunizations  Reviewed on 9/18/2017 Name Date Influenza High Dose Vaccine PF 9/15/2017 Pneumococcal Vaccine (Unspecified Type) 1/1/2011 Not reviewed this visit You Were Diagnosed With   
  
 Codes Comments Hypertension, unspecified type    -  Primary ICD-10-CM: I10 
ICD-9-CM: 401.9 Hypertriglyceridemia     ICD-10-CM: E78.1 ICD-9-CM: 272.1 Cerebrovascular disease     ICD-10-CM: I67.9 ICD-9-CM: 437.9 On statin therapy     ICD-10-CM: Z79.899 ICD-9-CM: V58.69 Rash     ICD-10-CM: R21 
ICD-9-CM: 782.1 Vitals BP Pulse Temp Resp Height(growth percentile) Weight(growth percentile) 135/79 (BP 1 Location: Left arm, BP Patient Position: Sitting) 66 98 °F (36.7 °C) (Oral) 18 5' 9\" (1.753 m) 193 lb 6.4 oz (87.7 kg) SpO2 BMI Smoking Status 96% 28.56 kg/m2 Former Smoker BMI and BSA Data Body Mass Index Body Surface Area 28.56 kg/m 2 2.07 m 2 Preferred Pharmacy Pharmacy Name Phone [facility] [address], [address] [place] [address]. [phone] Your Updated Medication List  
  
   
This list is accurate as of 4/10/18 11:38 AM.  Always use your most recent med list.  
  
  
  
  
 aspirin 81 mg chewable tablet Take 1 Tab by mouth daily. atorvastatin 40 mg tablet Commonly known as:  LIPITOR Take 1 Tab by mouth nightly. BENADRYL 25 mg capsule Generic drug:  diphenhydrAMINE Take 25 mg by mouth every six (6) hours as needed for Itching or Skin Irritation. desoximetasone 0.25 % topical cream  
Commonly known as:  TOPICORT Apply  to affected area two (2) times a day. hydroCHLOROthiazide 25 mg tablet Commonly known as:  HYDRODIURIL Take 25 mg by mouth daily. Indications: HYPERTENSION  
  
 permethrin 5 % topical cream  
Commonly known as:  ELIMITE  
apply sparingly as directed Prescriptions Sent to Pharmacy Refills  
 permethrin (ELIMITE) 5 % topical cream 0 Sig: apply sparingly as directed Class: Normal  
 Pharmacy: [facility] [address], [address] [place] [address]. Ph #: [phone] We Performed the Following AMB POC CK (CPK) [81509 CPT(R)] AMB POC COMPREHENSIVE METABOLIC PANEL [81816 CPT(R)] AMB POC LIPID PROFILE [29535 CPT(R)] AMB POC URINALYSIS DIP STICK AUTO W/ MICRO  [76776 CPT(R)] Follow-up Instructions Return in about 6 months (around 10/10/2018) for [facility]! [facility] introduces Veristorm patient portal. Now you can access parts of your medical record, email your doctor's office, and request medication refills online. 1. In your internet browser, go to [email or web address] 2. Click on the First Time User? Click Here link in the Sign In box. You will see the New Member Sign Up page. 3. Enter your Veristorm Access Code exactly as it appears below. You will not need to use this code after youve completed the sign-up process. If you do not sign up before the expiration date, you must request a new code. · Veristorm Access Code: [email or web address] Expires: 7/9/2018 11:38 AM 
 
4. Enter the last four digits of your Social Security Number (xxxx) and Date of Birth (mm/dd/yyyy) as indicated and click Submit. You will be taken to the next sign-up page. 5. Create a Veristorm ID. This will be your Veristorm login ID and cannot be changed, so think of one that is secure and easy to remember. 6. Create a Veristorm password. You can change your password at any time. 7. Enter your Password Reset Question and Answer. This can be used at a later time if you forget your password. 8. Enter your e-mail address. You will receive e-mail notification when new information is available in [address] 9. Click Sign Up. You can now view and download portions of your medical record. 10. Click the Download Summary menu link to download a portable copy of your medical information. If you have questions, please visit the Frequently Asked Questions section of the Veristorm website. Remember, Veristorm is NOT to be used for urgent needs. For medical emergencies, dial 911. Now available from your iPhone and Android! Please provide this summary of care documentation to your next provider. Your primary care clinician is listed as LEYLA Causey. If you have any questions after today's visit, please call [phone].

## 2018-04-10 NOTE — PROGRESS NOTES
This note will not be viewable in [address] Liliana Dc is a 80 y.o. male and presents with Medication Evaluation ((room 8)   follow up  )  . Subjective:  Mr. Ana Serrano presents today for follow-up of several problems including hypertension, hyperlipidemia, cerebrovascular disease with a history of stroke presenting as dysarthria, and a rash. He developed a rash after helping string some lights at [employer] in December and was treated at that time and his symptoms resolved. He states more recently that he thinks his rash started up again after he wore a shirt that he had worn when he went to lose cancer. He has discarded this shirt. It had been washed in hot water. He denies headache, shortness breath, chest pain, PND, orthopnea, or pedal edema. He does not have any focal weakness and denies any lingering dysarthria.     Past Medical History:   Diagnosis Date    Acute gout of right foot 11/1/2017    Allergic rhinitis 11/1/2017    Impression: trial of otc Zyrtec    Arthralgia 11/1/2017    Impression: left hip, possibly referred from spine    Arthritis 11/1/2017    Back pain 11/1/2017    Chest pain 11/1/2017    Impression: left shoulder/arm pain ?anginal equivalent    Cough 11/1/2017    Impression: suspect medication related, will follow    Fatigue 11/1/2017    High cholesterol     HTN (hypertension) 11/1/2017    Impression: stable on HCTZ    Hypertension     intermittent    Hypertriglyceridemia 11/1/2017    Insomnia 11/1/2017    LBBB (left bundle branch block)     Nasal polyp 11/1/2017    Prostate cancer screening 11/1/2017    Rash 11/1/2017    Stroke ([facility] [address]     Wrist pain, acute, left 11/1/2017    Impression: mostly resolved, pt concerned this was heart related, more likely musculoskeletal or neuropathic, observe, if increases/persists follow up     Past Surgical History:   Procedure Laterality Date    HX CIRCUMCISION  2013    HX MOHS PROCEDURES Left     NEUROLOGICAL PROCEDURE UNLISTED      \"pinched nerve neck\"     Allergies   Allergen Reactions    Hydrocodone Other (comments)     jittery    Lisinopril Cough     Current Outpatient Prescriptions   Medication Sig Dispense Refill    permethrin (ELIMITE) 5 % topical cream apply sparingly as directed 60 g 0    aspirin 81 mg chewable tablet Take 1 Tab by mouth daily. 30 Tab 6    atorvastatin (LIPITOR) 40 mg tablet Take 1 Tab by mouth nightly. 30 Tab 6    hydrochlorothiazide (HYDRODIURIL) 25 mg tablet Take 25 mg by mouth daily. Indications: HYPERTENSION      desoximetasone (TOPICORT) 0.25 % topical cream Apply  to affected area two (2) times a day.  diphenhydrAMINE (BENADRYL) 25 mg capsule Take 25 mg by mouth every six (6) hours as needed for Itching or Skin Irritation. Social History     Social History    Marital status:      Spouse name: N/A    Number of children: N/A    Years of education: N/A     Social History Main Topics    Smoking status: Former Smoker    Smokeless tobacco: Never Used    Alcohol use Yes      Comment: 1-2 beers per month    Drug use: No    Sexual activity: Not Asked     Other Topics Concern    None     Social History Narrative     Family History   Problem Relation Age of Onset    Dementia Mother     Heart Disease Father        Review of Systems  Constitutional:  negative for fevers, chills, anorexia and weight loss  Eyes:    negative for visual disturbance and irritation  ENT:    negative for tinnitus,sore throat,nasal congestion,ear pains. hoarseness  Respiratory:     negative for cough, hemoptysis, dyspnea,wheezing  CV:    negative for chest pain, palpitations, lower extremity edema  GI:    negative for nausea, vomiting, diarrhea, abdominal pain,melena  Endo:               negative for polyuria,polydipsia,polyphagia,heat intolerance  Genitourinary : negative for frequency, dysuria and hematuria  Integumentary: Positive for rash and pruritus  Hematologic: negative for easy bruising and gum/nose bleeding  Musculoskel:  negative for myalgias, arthralgias, back pain, muscle weakness, joint pain  Neurological:   negative for headaches, dizziness, vertigo, memory problems and gait   Behavl/Psych:  negative for feelings of anxiety, depression, mood changes  ROS otherwise negative      Objective:  Visit Vitals    /79 (BP 1 Location: Left arm, BP Patient Position: Sitting)    Pulse 66    Temp 98 °F (36.7 °C) (Oral)    Resp 18    Ht 5' 9\" (1.753 m)    Wt 193 lb 6.4 oz (87.7 kg)    SpO2 96%    BMI 28.56 kg/m2     Physical Exam:   General appearance - alert, well appearing, and in no distress  Mental status - alert, oriented to person, place, and time  EYE-KESHIA, EOMI, fundi normal, corneas normal, no foreign bodies  ENT-ENT exam normal, no neck nodes or sinus tenderness  Nose - normal and patent, no erythema, discharge or polyps  Mouth - mucous membranes moist, pharynx normal without lesions  Neck - supple, no significant adenopathy   Chest - clear to auscultation, no wheezes, rales or rhonchi, symmetric air entry   Heart - normal rate, regular rhythm, normal S1, S2, no murmurs, rubs, clicks or gallops   Abdomen - soft, nontender, nondistended, no masses or organomegaly  Lymph- no adenopathy palpable  Ext-peripheral pulses normal, no pedal edema, no clubbing or cyanosis  Skin-several small erythematous lesions on the trunk and torso. A couple of excoriated lesions on his flank. Neuro -alert, oriented, normal speech, no focal findings or movement disorder noted      Assessment/Plan:  Diagnoses and all orders for this visit:    1. Hypertension, unspecified type  -     AMB POC COMPREHENSIVE METABOLIC PANEL  -     AMB POC URINALYSIS DIP STICK AUTO W/ MICRO     2. Hypertriglyceridemia  -     AMB POC LIPID PROFILE    3. Cerebrovascular disease    4. On statin therapy  -     AMB POC CK (CPK)    5.  Rash  -     permethrin (ELIMITE) 5 % topical cream; apply sparingly as directed          ICD-10-CM ICD-9-CM    1. Hypertension, unspecified type I10 401.9 AMB POC COMPREHENSIVE METABOLIC PANEL      AMB POC URINALYSIS DIP STICK AUTO W/ MICRO    2. Hypertriglyceridemia E78.1 272.1 AMB POC LIPID PROFILE   3. Cerebrovascular disease I67.9 437.9    4. On statin therapy Z79.899 V58.69 AMB POC CK (CPK)   5. Rash R21 782. 1 permethrin (ELIMITE) 5 % topical cream     Plan:    Continue current medical regimen as outlined above. Regarding his rash and concerned that this may represent a mite because of his recurring nature. He will be prescribed Elimite cream is given instructions on its use. If this does not alleviate his symptoms I would consider referral to dermatology for further evaluation. He will have follow-up labs including CK conference metabolic profile and lipid profile. Results be forwarded to Dr. John Velez. Follow-up Disposition:  Return in about 6 months (around 10/10/2018) for [address] [place] [address]. I have reviewed with the patient details of the assessment and plan and all questions were answered. Relevent patient education was performed. Verbal and/or written instructions (see AVS) provided. The most recent lab findings were reviewed with the patient. Plan was discussed with patient who verbally expressed understanding. An After Visit Summary was printed and given to the patient.     Coleman Stephenson MD

## 2018-04-10 NOTE — PROGRESS NOTES
Alejandro Angulo is a 80 y.o. male  Chief Complaint   Patient presents with    Medication Evaluation     (room 8)   follow up       Visit Vitals    /79 (BP 1 Location: Left arm, BP Patient Position: Sitting)    Pulse 66    Temp 98 °F (36.7 °C) (Oral)    Resp 18    Ht 5' 9\" (1.753 m)    Wt 193 lb 6.4 oz (87.7 kg)    SpO2 96%    BMI 28.56 kg/m2     1. Have you been to the ER, urgent care clinic since your last visit? Hospitalized since your last visit?  no    2. Have you seen or consulted any other health care providers outside of the [facility] since your last visit? Include any pap smears or colon screening.  no

## 2018-06-28 ENCOUNTER — OFFICE VISIT (OUTPATIENT)
Dept: NEUROLOGY | Age: 83
End: 2018-06-28

## 2018-06-28 VITALS
HEART RATE: 69 BPM | SYSTOLIC BLOOD PRESSURE: 118 MMHG | BODY MASS INDEX: 29.09 KG/M2 | WEIGHT: 196.4 LBS | OXYGEN SATURATION: 98 % | HEIGHT: 69 IN | DIASTOLIC BLOOD PRESSURE: 64 MMHG

## 2018-06-28 DIAGNOSIS — I63.9 CEREBROVASCULAR ACCIDENT (CVA), UNSPECIFIED MECHANISM (HCC): Primary | ICD-10-CM

## 2018-06-28 DIAGNOSIS — I65.22 LEFT CAROTID STENOSIS: ICD-10-CM

## 2018-06-28 DIAGNOSIS — E78.5 DYSLIPIDEMIA, GOAL LDL BELOW 70: ICD-10-CM

## 2018-06-28 NOTE — PROGRESS NOTES
Neurology follow-up note    06/28/18    Chief Complaint   Patient presents with    Follow-up     Stroke       SUBJECTIVE  Omar Quarles is a 80 y.o. male who presented to the neurology office for management of stroke, hyperlipidemia and carotid stenosis. To recap,  on 11/27/2017 he was going to the botanical garden to help out with the lights. On the way over there she was not able to get the words out when he was talking with his friend. He came home and the wife noticed the same. He knew what he wanted to say but was not able to express it. This lasted around 6 hours and then his symptoms resolved. No new symptoms. Current Outpatient Prescriptions   Medication Sig    aspirin 81 mg chewable tablet Take 1 Tab by mouth daily.  atorvastatin (LIPITOR) 40 mg tablet Take 1 Tab by mouth nightly.  hydrochlorothiazide (HYDRODIURIL) 25 mg tablet Take 25 mg by mouth daily. Indications: HYPERTENSION     No current facility-administered medications for this visit.         Past Medical History:   Diagnosis Date    Acute gout of right foot 11/1/2017    Allergic rhinitis 11/1/2017    Impression: trial of otc Zyrtec    Arthralgia 11/1/2017    Impression: left hip, possibly referred from spine    Arthritis 11/1/2017    Back pain 11/1/2017    Chest pain 11/1/2017    Impression: left shoulder/arm pain ?anginal equivalent    Cough 11/1/2017    Impression: suspect medication related, will follow    Fatigue 11/1/2017    High cholesterol     HTN (hypertension) 11/1/2017    Impression: stable on HCTZ    Hypertension     intermittent    Hypertriglyceridemia 11/1/2017    Insomnia 11/1/2017    LBBB (left bundle branch block)     Nasal polyp 11/1/2017    Prostate cancer screening 11/1/2017    Rash 11/1/2017    Stroke ([facility] [address]     Wrist pain, acute, left 11/1/2017    Impression: mostly resolved, pt concerned this was heart related, more likely musculoskeletal or neuropathic, observe, if increases/persists follow up     Past Surgical History:   Procedure Laterality Date    HX CIRCUMCISION  2013    HX MOHS PROCEDURES Left     NEUROLOGICAL PROCEDURE UNLISTED      \"pinched nerve neck\"     Family History   Problem Relation Age of Onset    Dementia Mother     Heart Disease Father      Social History   Substance Use Topics    Smoking status: Former Smoker    Smokeless tobacco: Never Used    Alcohol use Yes      Comment: 1-2 beers per month       REVIEW OF SYSTEMS:   A ten system review of constitutional, cardiovascular, respiratory, musculoskeletal, endocrine, skin, SHEENT, genitourinary, psychiatric and neurologic systems was obtained and is unremarkable except stroke    EXAMINATION:   Visit Vitals    /64    Pulse 69    Ht 5' 9\" (1.753 m)    Wt 196 lb 6.4 oz (89.1 kg)    SpO2 98%    BMI 29 kg/m2        General:   General appearance: Pt is in no acute distress   Distal pulses are preserved    Neurological Examination:   Mental Status: AAO x3. Speech is fluent. Follows commands, has normal fund of knowledge, attention, short term recall, comprehension and insight. Cranial Nerves: Visual fields are full. PERRL, Extraocular movements are full. Facial sensation intact V1- V3. Facial movement intact, symmetric. Hearing intact to conversation. Palate elevates symmetrically. Shoulder shrug symmetric. Tongue midline. Motor: Strength is 5/5 in all 4 ext. Sensation: Normal to light touch    Coordination/Cerebellar: Intact to finger-nose-finger     Skin: No significant bruising or lacerations.     Laboratory review:   Results for orders placed or performed in visit on 04/10/18   AMB POC LIPID PROFILE   Result Value Ref Range    Cholesterol (POC) 104.0 0 - 200 mg/dL    Triglycerides (POC) 145.0 0 - 200 mg/dL    HDL Cholesterol (POC) 41.0 35 - 130 mg/dL    VLDL (POC) 29.0 MG/DL    LDL Cholesterol (POC) 34.0 0.0 - 130.0 MG/DL    TChol/HDL Ratio (POC) 2.5 0.0 - 4.0   AMB POC CK (CPK)   Result Value Ref Range CK (CPK) (POC) 65.0 30 - 135 U/L   AMB POC COMPREHENSIVE METABOLIC PANEL   Result Value Ref Range    GLUCOSE 102.0 75 - 110 mg/dL    BUN 18.0 9 - 20 mg/dL    Creatinine (POC) 0.9 0.8 - 1.5 mg/dL    Sodium (POC) 140.0 137 - 145 MMOL/L    Potassium (POC) 4.0 3.6 - 5.0 MMOL/L    CHLORIDE 99.0 98 - 107 MMOL/L    CO2 30.0 22 - 32 MMOL/L    CALCIUM 9.5 8.4 - 10.2 mg/dL    TOTAL PROTEIN 7.6 6.3 - 8.2 g/dL    ALBUMIN 4.4 3.9 - 5.4 g/dL    AST (POC) 20 14 - 36 U/L    ALT (POC) 33 9 - 52 U/L    ALKALINE PHOS 84.0 38 - 126 U/L    TOTAL BILIRUBIN 1.2 0.2 - 1.3 mg/dL    eGFR (POC) 79.3    AMB POC URINALYSIS DIP STICK AUTO W/ MICRO    Result Value Ref Range    Color (UA POC) Light Yellow     Clarity (UA POC) Clear     Glucose (UA POC) Negative Negative    Bilirubin (UA POC) Negative Negative    Ketones (UA POC) Negative Negative    Specific gravity (UA POC) 1.015 1.010 - 1.025    Blood (UA POC) 1+ Negative    pH (UA POC) 7.0 5.0 - 7.0    Protein (UA POC) Negative Negative    Urobilinogen (UA POC) normal 0.2 - 1    Nitrites (UA POC) Negative Negative    Leukocyte esterase (UA POC) Negative Negative    Epithelial cells (UA POC) Negative     Mucus (UA POC) None     WBCs (UA POC) 0      RBCs (UA POC) 3-5     Casts (UA POC) 0 Negative    Crystals (UA POC) Negative Negative    Clue Cells (UA POC) NEGATIVE     Trichomonas (UA POC) Negative     Yeast (UA POC) Negative     Bacteria (UA POC) None Negative    URINE CULT COMMENT (UA POC) Culture Not Indicated        Imaging review:  11/27/2017  CT scan of the head without contrast  No acute changes    Carotid ultrasound  Less than 50% stenosis of right ICA  50-69% stenosis of left ICA    Transthoracic echo  Ejection fraction 55-60%    Stroke labs:  HgBA1c    Lab Results   Component Value Date/Time    Hemoglobin A1c 5.6 11/28/2017 05:12 AM     LDL   Lab Results   Component Value Date/Time    LDL, calculated 139.4 (H) 11/28/2017 05:12 AM       Documentation review:  None    Assessment/Plan: 1. Cerebrovascular accident (CVA), unspecified mechanism ([facility] [address]  Continue aspirin 81 mg a day. No new strokelike symptoms. 2. Left carotid stenosis  Patient has left carotid stenosis 50-69%. Will order carotid ultrasound. 3. Dyslipidemia, goal LDL below 70  Patient's LDL was elevated. Now on atorvastatin 40 mg daily. Goal LDL is less than 70    Patient to follow-up with primary care physician. ICD-10-CM ICD-9-CM    1. Cerebrovascular accident (CVA), unspecified mechanism ([facility] [address] I63.9 434.91    2. Left carotid stenosis I65.22 433.10    3. Dyslipidemia, goal LDL below 70 E78.5 272.4         Lauren Matthew MD  Neurologist    CC: Jose Miguel August MD  Fax: [phone]    This note was created using voice recognition software. Despite editing, there may be syntax errors. This note will not be viewable in [address]

## 2018-06-28 NOTE — MR AVS SNAPSHOT
[address], 
[address], [address] [facility] [facility] 
[phone] Patient: Irasema Edgar MRN: [id number] MZI:[birth year] Visit Information Date & Time Provider Department Dept. Phone Encounter #  
 6/28/2018 10:00 AM Adán López MD [facility]rology [facility] at [facility] [phone] [id number] Your Appointments 10/23/2018  9:30 AM  
Follow Up with LEYLA Cuba MD  
[facility] ([address]) Appt Note: [address] [id number] [address] [address] Upcoming Health Maintenance Date Due DTaP/Tdap/Td series (1 - Tdap) 12/3/1956 ZOSTER VACCINE AGE 60> 10/3/1995 GLAUCOMA SCREENING Q2Y 12/3/2000 Pneumococcal 65+ Low/Medium Risk (2 of 2 - PPSV23) 1/1/2016 MEDICARE YEARLY EXAM 3/14/2018 Influenza Age 5 to Adult 8/1/2018 Allergies as of 6/28/2018  Review Complete On: 6/28/2018 By: Adán López MD  
  
 Severity Noted Reaction Type Reactions Hydrocodone  12/01/2014   Intolerance Other (comments)  
 jittery Lisinopril  12/01/2014   Intolerance Cough Current Immunizations  Reviewed on 9/18/2017 Name Date Influenza High Dose Vaccine PF 9/15/2017 Pneumococcal Vaccine (Unspecified Type) 1/1/2011 Not reviewed this visit You Were Diagnosed With   
  
 Codes Comments Cerebrovascular accident (CVA), unspecified mechanism ([facility] [address]    -  Primary ICD-10-CM: I63.9 ICD-9-CM: 434.91 Left carotid stenosis     ICD-10-CM: W33.56 
ICD-9-CM: 433.10 Dyslipidemia, goal LDL below 70     ICD-10-CM: E78.5 ICD-9-CM: 272.4 Vitals BP Pulse Height(growth percentile) Weight(growth percentile) SpO2 BMI  
 118/64 69 5' 9\" (1.753 m) 196 lb 6.4 oz (89.1 kg) 98% 29 kg/m2 Smoking Status Former Smoker BMI and BSA Data Body Mass Index Body Surface Area  
 29 kg/m 2 2.08 m 2 Preferred Pharmacy Pharmacy Name Phone [facility], [address] [place] [address]. [phone] Your Updated Medication List  
  
   
This list is accurate as of 6/28/18 10:21 AM.  Always use your most recent med list.  
  
  
  
  
 aspirin 81 mg chewable tablet Take 1 Tab by mouth daily. atorvastatin 40 mg tablet Commonly known as:  LIPITOR Take 1 Tab by mouth nightly. hydroCHLOROthiazide 25 mg tablet Commonly known as:  HYDRODIURIL Take 25 mg by mouth daily. Indications: HYPERTENSION To-Do List   
 06/28/2018 Imaging:  DUPLEX CAROTID BILATERAL AMB NEURO Patient Instructions 1. Carotid ultrasound 2. Follow-up as needed A Healthy Lifestyle: Care Instructions Your Care Instructions A healthy lifestyle can help you feel good, stay at a healthy weight, and have plenty of energy for both work and play. A healthy lifestyle is something you can share with your whole family. A healthy lifestyle also can lower your risk for serious health problems, such as high blood pressure, heart disease, and diabetes. You can follow a few steps listed below to improve your health and the health of your family. Follow-up care is a key part of your treatment and safety. Be sure to make and go to all appointments, and call your doctor if you are having problems. It's also a good idea to know your test results and keep a list of the medicines you take. How can you care for yourself at home? · Do not eat too much sugar, fat, or fast foods. You can still have dessert and treats now and then. The goal is moderation. · Start small to improve your eating habits. Pay attention to portion sizes, drink less juice and soda pop, and eat more fruits and vegetables. ¨ Eat a healthy amount of food.  A 3-ounce serving of meat, for example, is about the size of a deck of cards. Fill the rest of your plate with vegetables and whole grains. ¨ Limit the amount of soda and sports drinks you have every day. Drink more water when you are thirsty. ¨ Eat at least 5 servings of fruits and vegetables every day. It may seem like a lot, but it is not hard to reach this goal. A serving or helping is 1 piece of fruit, 1 cup of vegetables, or 2 cups of leafy, raw vegetables. Have an apple or some carrot sticks as an afternoon snack instead of a candy bar. Try to have fruits and/or vegetables at every meal. 
· Make exercise part of your daily routine. You may want to start with simple activities, such as walking, bicycling, or slow swimming. Try to be active 30 to 60 minutes every day. You do not need to do all 30 to 60 minutes all at once. For example, you can exercise 3 times a day for 10 or 20 minutes. Moderate exercise is safe for most people, but it is always a good idea to talk to your doctor before starting an exercise program. 
· Keep moving. Benitez Buddle the lawn, work in the garden, or [place]. Take the stairs instead of the elevator at work. · If you smoke, quit. People who smoke have an increased risk for heart attack, stroke, cancer, and other lung illnesses. Quitting is hard, but there are ways to boost your chance of quitting tobacco for good. ¨ Use nicotine gum, patches, or lozenges. ¨ Ask your doctor about stop-smoking programs and medicines. ¨ Keep trying. In addition to reducing your risk of diseases in the future, you will notice some benefits soon after you stop using tobacco. If you have shortness of breath or asthma symptoms, they will likely get better within a few weeks after you quit. · Limit how much alcohol you drink. Moderate amounts of alcohol (up to 2 drinks a day for men, 1 drink a day for women) are okay. But drinking too much can lead to liver problems, high blood pressure, and other health problems. Family health If you have a family, there are many things you can do together to improve your health. · Eat meals together as a family as often as possible. · Eat healthy foods. This includes fruits, vegetables, lean meats and dairy, and whole grains. · Include your family in your fitness plan. Most people think of activities such as jogging or tennis as the way to fitness, but there are many ways you and your family can be more active. Anything that makes you breathe hard and gets your heart pumping is exercise. Here are some tips: 
¨ Walk to do errands or to take your child to school or the bus. ¨ Go for a family bike ride after dinner instead of watching TV. Where can you learn more? Go to http://vannessa-sameer.info/. Enter H463 in the search box to learn more about \"A Healthy Lifestyle: Care Instructions. \" Current as of: May 12, 2017 Content Version: 11.4 © 4531-8871 [employer]. Care instructions adapted under license by [employer] (which disclaims liability or warranty for this information). If you have questions about a medical condition or this instruction, always ask your healthcare professional. [place] [address] any warranty or liability for your use of this information. Please be advised there is a $25 fee for all paperwork to be completed from our  providers. This is to be paid by the patient prior to picking up the completed forms. Introducing \[facility] [place] [facility]\"" & HEALTH SERVICES! Mallory Borrero introduces [employer] patient portal. Now you can access parts of your medical record, email your doctor's office, and request medication refills online. 1. In your internet browser, go to [email or web address] 2. Click on the First Time User? Click Here link in the Sign In box. You will see the New Member Sign Up page. 3. Enter your [employer] Access Code exactly as it appears below.  You will not need to use this code after youve completed the sign-up process. If you do not sign up before the expiration date, you must request a new code. · [email or web address] Access Code: [email or web address] Expires: 7/9/2018 11:38 AM 
 
4. Enter the last four digits of your Social Security Number (xxxx) and Date of Birth (mm/dd/yyyy) as indicated and click Submit. You will be taken to the next sign-up page. 5. Create a [email or web address] ID. This will be your [email or web address] login ID and cannot be changed, so think of one that is secure and easy to remember. 6. Create a [email or web address] password. You can change your password at any time. 7. Enter your Password Reset Question and Answer. This can be used at a later time if you forget your password. 8. Enter your e-mail address. You will receive e-mail notification when new information is available in [address] 9. Click Sign Up. You can now view and download portions of your medical record. 10. Click the Download Summary menu link to download a portable copy of your medical information. If you have questions, please visit the Frequently Asked Questions section of the [email or web address] website. Remember, [email or web address] is NOT to be used for urgent needs. For medical emergencies, dial 911. Now available from your iPhone and Android! Please provide this summary of care documentation to your next provider. Your primary care clinician is listed as LEYLA Rodrigez. If you have any questions after today's visit, please call [phone].

## 2018-06-28 NOTE — PATIENT INSTRUCTIONS
1. Carotid ultrasound  2. Follow-up as needed     A Healthy Lifestyle: Care Instructions  Your Care Instructions    A healthy lifestyle can help you feel good, stay at a healthy weight, and have plenty of energy for both work and play. A healthy lifestyle is something you can share with your whole family. A healthy lifestyle also can lower your risk for serious health problems, such as high blood pressure, heart disease, and diabetes. You can follow a few steps listed below to improve your health and the health of your family. Follow-up care is a key part of your treatment and safety. Be sure to make and go to all appointments, and call your doctor if you are having problems. It's also a good idea to know your test results and keep a list of the medicines you take. How can you care for yourself at home? · Do not eat too much sugar, fat, or fast foods. You can still have dessert and treats now and then. The goal is moderation. · Start small to improve your eating habits. Pay attention to portion sizes, drink less juice and soda pop, and eat more fruits and vegetables. ¨ Eat a healthy amount of food. A 3-ounce serving of meat, for example, is about the size of a deck of cards. Fill the rest of your plate with vegetables and whole grains. ¨ Limit the amount of soda and sports drinks you have every day. Drink more water when you are thirsty. ¨ Eat at least 5 servings of fruits and vegetables every day. It may seem like a lot, but it is not hard to reach this goal. A serving or helping is 1 piece of fruit, 1 cup of vegetables, or 2 cups of leafy, raw vegetables. Have an apple or some carrot sticks as an afternoon snack instead of a candy bar. Try to have fruits and/or vegetables at every meal.  · Make exercise part of your daily routine. You may want to start with simple activities, such as walking, bicycling, or slow swimming. Try to be active 30 to 60 minutes every day.  You do not need to do all 30 to 60 minutes all at once. For example, you can exercise 3 times a day for 10 or 20 minutes. Moderate exercise is safe for most people, but it is always a good idea to talk to your doctor before starting an exercise program.  · Keep moving. Chilo Puff the lawn, work in the garden, or [employer]ac. Take the stairs instead of the elevator at work. · If you smoke, quit. People who smoke have an increased risk for heart attack, stroke, cancer, and other lung illnesses. Quitting is hard, but there are ways to boost your chance of quitting tobacco for good. ¨ Use nicotine gum, patches, or lozenges. ¨ Ask your doctor about stop-smoking programs and medicines. ¨ Keep trying. In addition to reducing your risk of diseases in the future, you will notice some benefits soon after you stop using tobacco. If you have shortness of breath or asthma symptoms, they will likely get better within a few weeks after you quit. · Limit how much alcohol you drink. Moderate amounts of alcohol (up to 2 drinks a day for men, 1 drink a day for women) are okay. But drinking too much can lead to liver problems, high blood pressure, and other health problems. Family health  If you have a family, there are many things you can do together to improve your health. · Eat meals together as a family as often as possible. · Eat healthy foods. This includes fruits, vegetables, lean meats and dairy, and whole grains. · Include your family in your fitness plan. Most people think of activities such as jogging or tennis as the way to fitness, but there are many ways you and your family can be more active. Anything that makes you breathe hard and gets your heart pumping is exercise. Here are some tips:  ¨ Walk to do errands or to take your child to school or the bus. ¨ Go for a family bike ride after dinner instead of watching TV. Where can you learn more? Go to http://vannessa-sameer.info/.   Enter T989 in the search box to learn more about \"A Healthy Lifestyle: Care Instructions. \"  Current as of: May 12, 2017  Content Version: 11.4  © 4397-8487 Healthwise, Incorporated. Care instructions adapted under license by [facility] (which disclaims liability or warranty for this information). If you have questions about a medical condition or this instruction, always ask your healthcare professional. [address] any warranty or liability for your use of this information. Please be advised there is a $25 fee for all paperwork to be completed from our  providers. This is to be paid by the patient prior to picking up the completed forms.

## 2018-06-28 NOTE — LETTER
6/28/2018 10:21 AM 
 
Patient:    Manuel Edwards YOB: 1935 Date of Visit:    6/28/2018 Dear Maki Armando MD 
 
Thank you for referring Mr. Manuel Edwards to me for evaluation/treatment. Below are the relevant portions of my assessment and plan of care. Neurology follow-up note 
 
06/28/18 Chief Complaint Patient presents with  Follow-up Stroke SUBJECTIVE Manuel Edwards is a 80 y.o. male who presented to the neurology office for management of stroke, hyperlipidemia and carotid stenosis. To recap,  on 11/27/2017 he was going to the botanical garden to help out with the lights. On the way over there she was not able to get the words out when he was talking with his friend. He came home and the wife noticed the same. He knew what he wanted to say but was not able to express it. This lasted around 6 hours and then his symptoms resolved. No new symptoms. Current Outpatient Prescriptions Medication Sig  
 aspirin 81 mg chewable tablet Take 1 Tab by mouth daily.  atorvastatin (LIPITOR) 40 mg tablet Take 1 Tab by mouth nightly.  hydrochlorothiazide (HYDRODIURIL) 25 mg tablet Take 25 mg by mouth daily. Indications: HYPERTENSION No current facility-administered medications for this visit. Past Medical History:  
Diagnosis Date  Acute gout of right foot 11/1/2017  Allergic rhinitis 11/1/2017 Impression: trial of otc Zyrtec  Arthralgia 11/1/2017 Impression: left hip, possibly referred from spine  Arthritis 11/1/2017  Back pain 11/1/2017  Chest pain 11/1/2017 Impression: left shoulder/arm pain ?anginal equivalent  Cough 11/1/2017 Impression: suspect medication related, will follow  Fatigue 11/1/2017  High cholesterol  HTN (hypertension) 11/1/2017 Impression: stable on HCTZ  Hypertension   
 intermittent  Hypertriglyceridemia 11/1/2017  Insomnia 11/1/2017  LBBB (left bundle branch block)  Nasal polyp 11/1/2017  Prostate cancer screening 11/1/2017  Rash 11/1/2017  Stroke ([facility] [address]  Wrist pain, acute, left 11/1/2017 Impression: mostly resolved, pt concerned this was heart related, more likely musculoskeletal or neuropathic, observe, if increases/persists follow up Past Surgical History:  
Procedure Laterality Date  HX CIRCUMCISION  2013  HX MOHS PROCEDURES Left  NEUROLOGICAL PROCEDURE UNLISTED \"pinched nerve neck\" Family History Problem Relation Age of Onset  Dementia Mother  Heart Disease Father Social History Substance Use Topics  Smoking status: Former Smoker  Smokeless tobacco: Never Used  Alcohol use Yes Comment: 1-2 beers per month REVIEW OF SYSTEMS:  
A ten system review of constitutional, cardiovascular, respiratory, musculoskeletal, endocrine, skin, SHEENT, genitourinary, psychiatric and neurologic systems was obtained and is unremarkable except stroke EXAMINATION:  
Visit Vitals  /64  Pulse 69  Ht 5' 9\" (1.753 m)  Wt 196 lb 6.4 oz (89.1 kg)  SpO2 98%  BMI 29 kg/m2 General:  
General appearance: Pt is in no acute distress Distal pulses are preserved Neurological Examination:  
Mental Status: AAO x3. Speech is fluent. Follows commands, has normal fund of knowledge, attention, short term recall, comprehension and insight. Cranial Nerves: Visual fields are full. PERRL, Extraocular movements are full. Facial sensation intact V1- V3. Facial movement intact, symmetric. Hearing intact to conversation. Palate elevates symmetrically. Shoulder shrug symmetric. Tongue midline. Motor: Strength is 5/5 in all 4 ext. Sensation: Normal to light touch Coordination/Cerebellar: Intact to finger-nose-finger Skin: No significant bruising or lacerations. Laboratory review:  
Results for orders placed or performed in visit on 04/10/18 AMB POC LIPID PROFILE Result Value Ref Range Cholesterol (POC) 104.0 0 - 200 mg/dL Triglycerides (POC) 145.0 0 - 200 mg/dL HDL Cholesterol (POC) 41.0 35 - 130 mg/dL VLDL (POC) 29.0 MG/DL  
 LDL Cholesterol (POC) 34.0 0.0 - 130.0 MG/DL TChol/HDL Ratio (POC) 2.5 0.0 - 4.0 AMB POC CK (CPK) Result Value Ref Range CK (CPK) (POC) 65.0 30 - 135 U/L AMB POC COMPREHENSIVE METABOLIC PANEL Result Value Ref Range GLUCOSE 102.0 75 - 110 mg/dL BUN 18.0 9 - 20 mg/dL Creatinine (POC) 0.9 0.8 - 1.5 mg/dL Sodium (POC) 140.0 137 - 145 MMOL/L Potassium (POC) 4.0 3.6 - 5.0 MMOL/L  
 CHLORIDE 99.0 98 - 107 MMOL/L  
 CO2 30.0 22 - 32 MMOL/L  
 CALCIUM 9.5 8.4 - 10.2 mg/dL TOTAL PROTEIN 7.6 6.3 - 8.2 g/dL ALBUMIN 4.4 3.9 - 5.4 g/dL AST (POC) 20 14 - 36 U/L  
 ALT (POC) 33 9 - 52 U/L ALKALINE PHOS 84.0 38 - 126 U/L  
 TOTAL BILIRUBIN 1.2 0.2 - 1.3 mg/dL  
 eGFR (POC) 79.3 AMB POC URINALYSIS DIP STICK AUTO W/ MICRO Result Value Ref Range Color (UA POC) Light Yellow Clarity (UA POC) Clear Glucose (UA POC) Negative Negative Bilirubin (UA POC) Negative Negative Ketones (UA POC) Negative Negative Specific gravity (UA POC) 1.015 1.010 - 1.025 Blood (UA POC) 1+ Negative pH (UA POC) 7.0 5.0 - 7.0 Protein (UA POC) Negative Negative Urobilinogen (UA POC) normal 0.2 - 1 Nitrites (UA POC) Negative Negative Leukocyte esterase (UA POC) Negative Negative Epithelial cells (UA POC) Negative Mucus (UA POC) None WBCs (UA POC) 0    
 RBCs (UA POC) 3-5 Casts (UA POC) 0 Negative Crystals (UA POC) Negative Negative Clue Cells (UA POC) NEGATIVE Trichomonas (UA POC) Negative Yeast (UA POC) Negative Bacteria (UA POC) None Negative URINE CULT COMMENT (UA POC) Culture Not Indicated Imaging review: 
11/27/2017 CT scan of the head without contrast 
No acute changes Carotid ultrasound Less than 50% stenosis of right ICA 
50-69% stenosis of left ICA Transthoracic echo Ejection fraction 55-60% Stroke labs: 
HgBA1c Lab Results Component Value Date/Time Hemoglobin A1c 5.6 11/28/2017 05:12 AM  
 
LDL Lab Results Component Value Date/Time LDL, calculated 139.4 (H) 11/28/2017 05:12 AM  
 
 
Documentation review: 
None Assessment/Plan: 1. Cerebrovascular accident (CVA), unspecified mechanism ([address] Continue aspirin 81 mg a day. No new strokelike symptoms. 2. Left carotid stenosis Patient has left carotid stenosis 50-69%. Will order carotid ultrasound. 3. Dyslipidemia, goal LDL below 70 Patient's LDL was elevated. Now on atorvastatin 40 mg daily. Goal LDL is less than 70 Patient to follow-up with primary care physician. ICD-10-CM ICD-9-CM 1. Cerebrovascular accident (CVA), unspecified mechanism ([address] I63.9 434.91   
2. Left carotid stenosis I65.22 433.10   
3. Dyslipidemia, goal LDL below 70 E78.5 272.4 Janet Jain MD 
Neurologist 
 
CC: Coleman Stephenson MD 
Fax: [phone] This note was created using voice recognition software. Despite editing, there may be syntax errors. This note will not be viewable in [address] If you have questions, please do not hesitate to call me. I look forward to following Mr. Lauryn Trevino along with you. Sincerely, Janet Jain MD

## 2018-07-06 RX ORDER — ATORVASTATIN CALCIUM 40 MG/1
TABLET, FILM COATED ORAL
Qty: 30 TAB | Refills: 5 | Status: SHIPPED | OUTPATIENT
Start: 2018-07-06 | End: 2018-10-15 | Stop reason: SDUPTHER

## 2018-07-06 RX ORDER — ATORVASTATIN CALCIUM 40 MG/1
TABLET, FILM COATED ORAL
Qty: 30 TAB | Refills: 5 | Status: SHIPPED | OUTPATIENT
Start: 2018-07-06 | End: 2018-08-31 | Stop reason: SDUPTHER

## 2018-07-10 ENCOUNTER — OFFICE VISIT (OUTPATIENT)
Dept: NEUROLOGY | Age: 83
End: 2018-07-10

## 2018-07-10 DIAGNOSIS — G45.1 HEMISPHERIC CAROTID ARTERY SYNDROME: ICD-10-CM

## 2018-07-10 DIAGNOSIS — I67.9 CEREBROVASCULAR DISEASE: Primary | ICD-10-CM

## 2018-07-10 DIAGNOSIS — I65.22 LEFT CAROTID STENOSIS: ICD-10-CM

## 2018-07-10 DIAGNOSIS — I65.23 BILATERAL CAROTID ARTERY STENOSIS: ICD-10-CM

## 2018-07-11 NOTE — PROCEDURES
This study consisted of pulsed wave Doppler examination, Color-flow imaging, and Duplex imaging of both the right and left carotid systems, and both vertebral arteries.        Imaging of both right and left carotid systems showed mild mixed plaquing at the bifurcations and proximal and distal internal and external carotid arteries bilaterally, with stenosis in the range of 16-49% only and with no flow abnormalities identified.        Both vertebral arteries showed normal antegrade flow.

## 2018-08-31 ENCOUNTER — OFFICE VISIT (OUTPATIENT)
Dept: INTERNAL MEDICINE CLINIC | Age: 83
End: 2018-08-31

## 2018-08-31 VITALS
HEART RATE: 72 BPM | TEMPERATURE: 97.8 F | RESPIRATION RATE: 16 BRPM | HEIGHT: 69 IN | OXYGEN SATURATION: 95 % | SYSTOLIC BLOOD PRESSURE: 116 MMHG | BODY MASS INDEX: 28.14 KG/M2 | DIASTOLIC BLOOD PRESSURE: 64 MMHG | WEIGHT: 190 LBS

## 2018-08-31 DIAGNOSIS — Z23 NEED FOR TETANUS, DIPHTHERIA, AND ACELLULAR PERTUSSIS (TDAP) VACCINE: ICD-10-CM

## 2018-08-31 DIAGNOSIS — F51.01 PRIMARY INSOMNIA: Primary | ICD-10-CM

## 2018-08-31 RX ORDER — ESZOPICLONE 2 MG/1
2 TABLET, FILM COATED ORAL
Qty: 30 TAB | Refills: 0 | Status: SHIPPED | OUTPATIENT
Start: 2018-08-31 | End: 2020-12-21 | Stop reason: SDUPTHER

## 2018-08-31 NOTE — PROGRESS NOTES
This note will not be viewable in [address] Delores Chaidez is a 80 y.o. male and presents with Insomnia (room 2)  . Subjective:  Mr. Loras Rinne presents today with complaint of insomnia. He states this is been present for the past couple of weeks. He is taken over-the-counter melatonin and Benadryl for this. It is helped somewhat over the past couple of nights but he notes in the morning when he awakens he feels groggy for couple of hours. He does not have any explanation for why he is having difficulty getting to sleep. He does not have ruminating thoughts and states that he has no particular concerns or problems that are worrying him. He is not taking any other medications and is not drinking alcohol.     Past Medical History:   Diagnosis Date    Acute gout of right foot 11/1/2017    Allergic rhinitis 11/1/2017    Impression: trial of otc Zyrtec    Arthralgia 11/1/2017    Impression: left hip, possibly referred from spine    Arthritis 11/1/2017    Back pain 11/1/2017    Chest pain 11/1/2017    Impression: left shoulder/arm pain ?anginal equivalent    Cough 11/1/2017    Impression: suspect medication related, will follow    Fatigue 11/1/2017    High cholesterol     HTN (hypertension) 11/1/2017    Impression: stable on HCTZ    Hypertension     intermittent    Hypertriglyceridemia 11/1/2017    Insomnia 11/1/2017    LBBB (left bundle branch block)     Nasal polyp 11/1/2017    Prostate cancer screening 11/1/2017    Rash 11/1/2017    Stroke ([facility] [address]     Wrist pain, acute, left 11/1/2017    Impression: mostly resolved, pt concerned this was heart related, more likely musculoskeletal or neuropathic, observe, if increases/persists follow up     Past Surgical History:   Procedure Laterality Date    HX CIRCUMCISION  2013    HX MOHS PROCEDURES Left     NEUROLOGICAL PROCEDURE UNLISTED      \"pinched nerve neck\"     Allergies   Allergen Reactions    Hydrocodone Other (comments)     jittery    Lisinopril Cough     Current Outpatient Prescriptions   Medication Sig Dispense Refill    eszopiclone (LUNESTA) 2 mg tablet Take 1 Tab by mouth nightly as needed for Sleep. Max Daily Amount: 2 mg. 30 Tab 0    atorvastatin (LIPITOR) 40 mg tablet TAKE ONE TABLET BY MOUTH NIGHTLY 30 Tab 5    aspirin 81 mg chewable tablet Take 1 Tab by mouth daily. 30 Tab 6    hydrochlorothiazide (HYDRODIURIL) 25 mg tablet Take 25 mg by mouth daily. Indications: HYPERTENSION       Social History     Social History    Marital status:      Spouse name: N/A    Number of children: N/A    Years of education: N/A     Social History Main Topics    Smoking status: Former Smoker    Smokeless tobacco: Never Used    Alcohol use Yes      Comment: 1-2 beers per month    Drug use: No    Sexual activity: Not Asked     Other Topics Concern    None     Social History Narrative     Family History   Problem Relation Age of Onset    Dementia Mother     Heart Disease Father        Review of Systems  Constitutional:  negative for fevers, chills, anorexia and weight loss  Eyes:    negative for visual disturbance and irritation  ENT:    negative for tinnitus,sore throat,nasal congestion,ear pains. hoarseness  Respiratory:     negative for cough, hemoptysis, dyspnea,wheezing  CV:    negative for chest pain, palpitations, lower extremity edema  GI:    negative for nausea, vomiting, diarrhea, abdominal pain,melena  Endo:               negative for polyuria,polydipsia,polyphagia,heat intolerance  Genitourinary : negative for frequency, dysuria and hematuria  Integumentary: negative for rash and pruritus  Hematologic:   negative for easy bruising and gum/nose bleeding  Musculoskel:  negative for myalgias, arthralgias, back pain, muscle weakness, joint pain  Neurological:   negative for headaches, dizziness, vertigo, memory problems and gait   Behavl/Psych:  negative for feelings of anxiety, depression, mood changes  ROS otherwise negative      Objective:  Visit Vitals    /64 (BP 1 Location: Left arm, BP Patient Position: Sitting)    Pulse 72    Temp 97.8 °F (36.6 °C) (Oral)    Resp 16    Ht 5' 9\" (1.753 m)    Wt 190 lb (86.2 kg)    SpO2 95%    BMI 28.06 kg/m2     Physical Exam:   General appearance - alert, well appearing, and in no distress  Mental status - alert, oriented to person, place, and time  EYE-KESHIA, EOMI, fundi normal, corneas normal, no foreign bodies  ENT-ENT exam normal, no neck nodes or sinus tenderness  Nose - normal and patent, no erythema, discharge or polyps  Mouth - mucous membranes moist, pharynx normal without lesions  Neck - supple, no significant adenopathy   Chest - clear to auscultation, no wheezes, rales or rhonchi, symmetric air entry   Heart - normal rate, regular rhythm, normal S1, S2, no murmurs, rubs, clicks or gallops   Abdomen - soft, nontender, nondistended, no masses or organomegaly  Lymph- no adenopathy palpable  Ext-peripheral pulses normal, no pedal edema, no clubbing or cyanosis  Skin-Warm and dry. no hyperpigmentation, vitiligo, or suspicious lesions  Neuro -alert, oriented, normal speech, no focal findings or movement disorder noted      Assessment/Plan:  Diagnoses and all orders for this visit:    1. Primary insomnia  -     eszopiclone (LUNESTA) 2 mg tablet; Take 1 Tab by mouth nightly as needed for Sleep. Max Daily Amount: 2 mg. 2. Need for tetanus, diphtheria, and acellular pertussis (Tdap) vaccine  -     TETANUS, DIPHTHERIA TOXOIDS AND ACELLULAR PERTUSSIS VACCINE (TDAP), IN INDIVIDS. >=7, IM          ICD-10-CM ICD-9-CM    1. Primary insomnia F51.01 307.42 eszopiclone (LUNESTA) 2 mg tablet   2.  Need for tetanus, diphtheria, and acellular pertussis (Tdap) vaccine Z23 V06.1 TETANUS, DIPHTHERIA TOXOIDS AND ACELLULAR PERTUSSIS VACCINE (TDAP), IN INDIVIDS. >=7, IM     Plan:    The patient will continue melatonin and Benadryl nightly for a week or 2 to see if he can get back on track with his sleeping. If this causes persistent drowsiness in the a.m. or he is so inclined he may try Lunesta 2 mg nightly for a couple of weeks and then as needed only. Tdap vaccine will be updated today. Patient is advised to get his flu vaccine and pneumonia vaccine in the fall. Follow-up Disposition:  Return for as scheduled. I have reviewed with the patient details of the assessment and plan and all questions were answered. Relevent patient education was performed. Verbal and/or written instructions (see AVS) provided. The most recent lab findings were reviewed with the patient. Plan was discussed with patient who verbally expressed understanding. An After Visit Summary was printed and given to the patient.     Farheen Joaquin MD

## 2018-08-31 NOTE — MR AVS SNAPSHOT
[place] [address] [address] [phone] Patient: Ya Mendoza MRN: [id number] LANDON:[id number] Visit Information Date & Time Provider Department Dept. Phone Encounter #  
 8/31/2018  1:20 PM LEYLA Nevarez MD [facility] [id number] Follow-up Instructions Return for as scheduled. Your Appointments 10/23/2018  9:30 AM  
Follow Up with MD ZACH Barros [facility] ([facility]) Appt Note: [address] [address] [address] [address] Upcoming Health Maintenance Date Due DTaP/Tdap/Td series (1 - Tdap) [birth year]/3/1956 ZOSTER VACCINE AGE 60> 10/3/1995 GLAUCOMA SCREENING Q2Y 12/3/2000 Pneumococcal 65+ Low/Medium Risk (2 of 2 - PPSV23) 1/1/2016 MEDICARE YEARLY EXAM 3/14/2018 Influenza Age 5 to Adult 8/1/2018 Allergies as of 8/31/2018  Review Complete On: 8/31/2018 By: Rolan Vieira LPN Severity Noted Reaction Type Reactions Hydrocodone  12/01/2014   Intolerance Other (comments)  
 jittery Lisinopril  12/01/2014   Intolerance Cough Current Immunizations  Reviewed on 9/18/2017 Name Date Influenza High Dose Vaccine PF 9/15/2017 Pneumococcal Vaccine (Unspecified Type) 1/1/2011 Tdap  Incomplete Not reviewed this visit You Were Diagnosed With   
  
 Codes Comments Primary insomnia    -  Primary ICD-10-CM: F51.01 
ICD-9-CM: 307.42 Need for tetanus, diphtheria, and acellular pertussis (Tdap) vaccine     ICD-10-CM: A34 ICD-9-CM: V06.1 Vitals BP Pulse Temp Resp Height(growth percentile) Weight(growth percentile) 116/64 (BP 1 Location: Left arm, BP Patient Position: Sitting) 72 97.8 °F (36.6 °C) (Oral) 16 5' 9\" (1.753 m) 190 lb (86.2 kg) SpO2 BMI Smoking Status 95% 28.06 kg/m2 Former Smoker Vitals History BMI and BSA Data Body Mass Index Body Surface Area 28.06 kg/m 2 2.05 m 2 Preferred Pharmacy Pharmacy Name Phone [facility] [address] [place] [address] Dr Smith, [address] [place] [address]. [phone] Your Updated Medication List  
  
   
This list is accurate as of 8/31/18  2:35 PM.  Always use your most recent med list.  
  
  
  
  
 aspirin 81 mg chewable tablet Take 1 Tab by mouth daily. atorvastatin 40 mg tablet Commonly known as:  LIPITOR  
TAKE ONE TABLET BY MOUTH NIGHTLY  
  
 eszopiclone 2 mg tablet Commonly known as:  Isadore  Take 1 Tab by mouth nightly as needed for Sleep. Max Daily Amount: 2 mg.  
  
 hydroCHLOROthiazide 25 mg tablet Commonly known as:  HYDRODIURIL Take 25 mg by mouth daily. Indications: HYPERTENSION Prescriptions Printed Refills  
 eszopiclone (LUNESTA) 2 mg tablet 0 Sig: Take 1 Tab by mouth nightly as needed for Sleep. Max Daily Amount: 2 mg. Class: Print Route: Oral  
  
We Performed the Following TETANUS, DIPHTHERIA TOXOIDS AND ACELLULAR PERTUSSIS VACCINE (TDAP), IN INDIVIDS. >=7, IM [id number] CPT(R)] Follow-up Instructions Return for as scheduled. Introducing hospitals & HEALTH SERVICES! [facility] introduces [employer] patient portal. Now you can access parts of your medical record, email your doctor's office, and request medication refills online. 1. In your internet browser, go to [email or web address] 2. Click on the First Time User? Click Here link in the Sign In box. You will see the New Member Sign Up page. 3. Enter your [employer] Access Code exactly as it appears below. You will not need to use this code after youve completed the sign-up process. If you do not sign up before the expiration date, you must request a new code. · [employer] Access Code: [email or web address] Expires: 10/8/2018 10:45 PM 
 
 4. Enter the last four digits of your Social Security Number (xxxx) and Date of Birth (mm/dd/yyyy) as indicated and click Submit. You will be taken to the next sign-up page. 5. Create a Hapara ID. This will be your H[employer]ara login ID and cannot be changed, so think of one that is secure and easy to remember. 6. Create a Hapara password. You can change your password at any time. 7. Enter your Password Reset Question and Answer. This can be used at a later time if you forget your password. 8. Enter your e-mail address. You will receive e-mail notification when new information is available in [address] 9. Click Sign Up. You can now view and download portions of your medical record. 10. Click the Download Summary menu link to download a portable copy of your medical information. If you have questions, please visit the Frequently Asked Questions section of the H[employer] website. Remember, [employer] is NOT to be used for urgent needs. For medical emergencies, dial 911. Now available from your iPhone and Android! Please provide this summary of care documentation to your next provider. Your primary care clinician is listed as LEYLA Blake. If you have any questions after today's visit, please call [phone].

## 2018-08-31 NOTE — PROGRESS NOTES
Chief Complaint   Patient presents with    Insomnia     room 2     1. Have you been to the ER, urgent care clinic since your last visit? NO Hospitalized since your last visit? NO     2. Have you seen or consulted any other health care providers outside of the [address] [place] [address] since your last visit? Include any pap smears or colon screening. NO    PT IS HERE FOR C/O OF \"INSOMNIA. \" PT STATES THIS HAS BEEN GOING ON FOR SEVERAL WEEKS.

## 2018-10-09 ENCOUNTER — ANESTHESIA (OUTPATIENT)
Dept: ENDOSCOPY | Age: 83
End: 2018-10-09
Payer: MEDICARE

## 2018-10-09 ENCOUNTER — HOSPITAL ENCOUNTER (OUTPATIENT)
Age: 83
Setting detail: OUTPATIENT SURGERY
Discharge: HOME | End: 2018-10-09
Attending: COLON & RECTAL SURGERY | Admitting: COLON & RECTAL SURGERY
Payer: MEDICARE

## 2018-10-09 ENCOUNTER — ANESTHESIA EVENT (OUTPATIENT)
Dept: ENDOSCOPY | Age: 83
End: 2018-10-09
Payer: MEDICARE

## 2018-10-09 VITALS
DIASTOLIC BLOOD PRESSURE: 72 MMHG | OXYGEN SATURATION: 98 % | HEART RATE: 84 BPM | HEIGHT: 69 IN | SYSTOLIC BLOOD PRESSURE: 166 MMHG | WEIGHT: 178 LBS | TEMPERATURE: 97.8 F | BODY MASS INDEX: 26.36 KG/M2 | RESPIRATION RATE: 13 BRPM

## 2018-10-09 PROCEDURE — 74011250636 HC RX REV CODE- 250/636

## 2018-10-09 PROCEDURE — 76060000031 HC ANESTHESIA FIRST 0.5 HR: Performed by: COLON & RECTAL SURGERY

## 2018-10-09 PROCEDURE — 74011250636 HC RX REV CODE- 250/636: Performed by: COLON & RECTAL SURGERY

## 2018-10-09 PROCEDURE — 76040000019: Performed by: COLON & RECTAL SURGERY

## 2018-10-09 RX ORDER — EPINEPHRINE 0.1 MG/ML
1 INJECTION INTRACARDIAC; INTRAVENOUS
Status: DISCONTINUED | OUTPATIENT
Start: 2018-10-09 | End: 2018-10-09 | Stop reason: HOSPADM

## 2018-10-09 RX ORDER — ATROPINE SULFATE 0.1 MG/ML
0.5 INJECTION INTRAVENOUS
Status: DISCONTINUED | OUTPATIENT
Start: 2018-10-09 | End: 2018-10-09 | Stop reason: HOSPADM

## 2018-10-09 RX ORDER — FLUMAZENIL 0.1 MG/ML
0.2 INJECTION INTRAVENOUS
Status: DISCONTINUED | OUTPATIENT
Start: 2018-10-09 | End: 2018-10-09 | Stop reason: HOSPADM

## 2018-10-09 RX ORDER — SODIUM CHLORIDE 9 MG/ML
50 INJECTION, SOLUTION INTRAVENOUS CONTINUOUS
Status: DISCONTINUED | OUTPATIENT
Start: 2018-10-09 | End: 2018-10-09 | Stop reason: HOSPADM

## 2018-10-09 RX ORDER — SODIUM CHLORIDE 0.9 % (FLUSH) 0.9 %
5-10 SYRINGE (ML) INJECTION AS NEEDED
Status: DISCONTINUED | OUTPATIENT
Start: 2018-10-09 | End: 2018-10-09 | Stop reason: HOSPADM

## 2018-10-09 RX ORDER — NALOXONE HYDROCHLORIDE 0.4 MG/ML
0.4 INJECTION, SOLUTION INTRAMUSCULAR; INTRAVENOUS; SUBCUTANEOUS
Status: DISCONTINUED | OUTPATIENT
Start: 2018-10-09 | End: 2018-10-09 | Stop reason: HOSPADM

## 2018-10-09 RX ORDER — SODIUM CHLORIDE 0.9 % (FLUSH) 0.9 %
5-10 SYRINGE (ML) INJECTION EVERY 8 HOURS
Status: DISCONTINUED | OUTPATIENT
Start: 2018-10-09 | End: 2018-10-09 | Stop reason: HOSPADM

## 2018-10-09 RX ORDER — PROPOFOL 10 MG/ML
INJECTION, EMULSION INTRAVENOUS AS NEEDED
Status: DISCONTINUED | OUTPATIENT
Start: 2018-10-09 | End: 2018-10-09 | Stop reason: HOSPADM

## 2018-10-09 RX ORDER — DEXTROMETHORPHAN/PSEUDOEPHED 2.5-7.5/.8
1.2 DROPS ORAL
Status: DISCONTINUED | OUTPATIENT
Start: 2018-10-09 | End: 2018-10-09 | Stop reason: HOSPADM

## 2018-10-09 RX ADMIN — SODIUM CHLORIDE 50 ML/HR: 900 INJECTION, SOLUTION INTRAVENOUS at 13:10

## 2018-10-09 RX ADMIN — PROPOFOL 50 MG: 10 INJECTION, EMULSION INTRAVENOUS at 13:56

## 2018-10-09 RX ADMIN — PROPOFOL 90 MG: 10 INJECTION, EMULSION INTRAVENOUS at 14:07

## 2018-10-09 NOTE — OP NOTES
Colonoscopy Procedure Note    Indications: Previous adenomatous polyp    Anesthesia/Sedation: MAC anesthesia Propofol    Pre-Procedure Exam:  Airway: clear   Heart: normal S1and S2    Lungs: clear bilateral  Abdomen: soft, nontender, bowel sounds present and normal in all quadrants   Mental Status: awake, alert, and oriented to person, place, and time      Procedure in Detail:  Informed consent was obtained for the procedure, including sedation. Risks of perforation, hemorrhage, adverse drug reaction, and aspiration were discussed. The patient was placed in the left lateral decubitus position. Based on the pre-procedure assessment, including review of the patient's medical history, medications, allergies, and review of systems, he had been deemed to be an appropriate candidate for moderate sedation; he was therefore sedated with the medications listed above. The patient was monitored continuously with ECG tracing, pulse oximetry, blood pressure monitoring, and direct observations. A rectal examination was performed. The [id number] was inserted into the rectum and advanced under direct vision to the cecum, which was identified by the ileocecal valve and appendiceal orifice. The quality of the colonic preparation was excellent. A careful inspection was made as the colonoscope was withdrawn, including a retroflexed view of the rectum; findings and interventions are described below. Appropriate photodocumentation was obtained. Findings:   Rectum:     - Protruding lesions:     -Internal Hemorrhoids  Sigmoid:     - Excavated lesions:     - Diverticulosis  Descending Colon:     - Excavated lesions:     - Diverticulosis  Transverse Colon:   Normal  Ascending Colon:   Normal  Cecum:   Normal          Specimens: No specimens were collected. EBL: None    Complications: None; patient tolerated the procedure well. Attending Attestation: I performed the procedure.     Recommendations:   - Repeat colonoscopy in 5 years.     Signed By: Gomez Downs MD                        October 9, 2018

## 2018-10-09 NOTE — ANESTHESIA POSTPROCEDURE EVALUATION
Post-Anesthesia Evaluation and Assessment Patient: Edgar Villalobos MRN: [id number]  SSN: [id number] YOB: 1935  Age: 80 y.o. Sex: male Cardiovascular Function/Vital Signs Visit Vitals  /70  Pulse (!) 53  Temp 36.5 °C (97.7 °F)  Resp 13  Ht 5' 9\" (1.753 m)  Wt 80.7 kg (178 lb)  SpO2 97%  BMI 26.29 kg/m2 Patient is status post general anesthesia for Procedure(s): 
COLONOSCOPY. Nausea/Vomiting: None Postoperative hydration reviewed and adequate. Pain: 
Pain Scale 1: Numeric (0 - 10) (10/09/18 1256) Pain Intensity 1: 0 (10/09/18 1256) Managed Neurological Status: At baseline Mental Status and Level of Consciousness: Arousable Pulmonary Status:  
O2 Device: Nasal cannula (10/09/18 1409) Adequate oxygenation and airway patent Complications related to anesthesia: None Post-anesthesia assessment completed. No concerns Signed By: King Bernal MD   
 October 9, 2018

## 2018-10-09 NOTE — ANESTHESIA PREPROCEDURE EVALUATION
Anesthetic History No history of anesthetic complications Review of Systems / Medical History Patient summary reviewed, nursing notes reviewed and pertinent labs reviewed Pulmonary Within defined limits Neuro/Psych CVA TIA Cardiovascular Hypertension: well controlled Dysrhythmias Exercise tolerance: >4 METS Comments: LBBB on EKG; structurally normal heart per Cardiologist  
GI/Hepatic/Renal 
Within defined limits Endo/Other Arthritis Other Findings Physical Exam 
 
Airway Mallampati: II 
TM Distance: 4 - 6 cm Neck ROM: normal range of motion Mouth opening: Normal 
 
 Cardiovascular Rhythm: regular Rate: normal 
 
 
 
 Dental 
 
Dentition: Rachel Garrett Pulmonary Breath sounds clear to auscultation Abdominal 
GI exam deferred Other Findings Anesthetic Plan ASA: 2 Anesthesia type: general 
 
Monitoring Plan: BIS Induction: Intravenous Anesthetic plan and risks discussed with: Patient

## 2018-10-09 NOTE — H&P
Colon and Rectal Surgery History and Physical 
 
Subjective:  
  
Kateryna Mandel is a 80 y.o. male who has hx luis m Patient Active Problem List  
 Diagnosis Date Noted  Cerebrovascular disease 04/10/2018  Bilateral carotid artery stenosis 11/28/2017  Dysarthria due to recent cerebrovascular accident (CVA) 11/28/2017  TIA (transient ischemic attack) 11/27/2017  Insomnia 11/01/2017  Wrist pain, acute, left 11/01/2017  Allergic rhinitis 11/01/2017  Acute gout of right foot 11/01/2017  Arthralgia 11/01/2017  Nasal polyp 11/01/2017  Back pain 11/01/2017  Cough 11/01/2017  Chest pain 11/01/2017  Arthritis 11/01/2017  Hypertriglyceridemia 11/01/2017  Fatigue 11/01/2017  
 HTN (hypertension) 11/01/2017  Rash 11/01/2017  Prostate cancer screening 11/01/2017 Past Medical History:  
Diagnosis Date  Allergic rhinitis 11/1/2017 Impression: trial of otc Zyrtec  Arthralgia 11/1/2017 Impression: left hip, possibly referred from spine  Arthritis 11/1/2017  Back pain 11/1/2017  Chest pain 11/1/2017 Impression: left shoulder/arm pain ?anginal equivalent  Cough 11/1/2017 Impression: suspect medication related, will follow  Fatigue 11/1/2017  High cholesterol  HTN (hypertension) 11/1/2017 Impression: stable on HCTZ  Hypertension   
 intermittent  Hypertriglyceridemia 11/1/2017  Insomnia 11/1/2017  LBBB (left bundle branch block)  Nasal polyp 11/1/2017  Prostate cancer screening 11/1/2017  Rash 11/1/2017  Stroke ([facility] [address] TIA  Wrist pain, acute, left 11/1/2017 Impression: mostly resolved, pt concerned this was heart related, more likely musculoskeletal or neuropathic, observe, if increases/persists follow up Past Surgical History:  
Procedure Laterality Date  HX CIRCUMCISION  2013  HX MOHS PROCEDURES Left  NEUROLOGICAL PROCEDURE UNLISTED \"pinched nerve neck\" Social History Substance Use Topics  Smoking status: Former Smoker Packs/day: 1.00 Years: 3.00 Quit date: 56  Smokeless tobacco: Never Used  Alcohol use 4.2 oz/week 7 Cans of beer per week Family History Problem Relation Age of Onset  Dementia Mother  Heart Disease Father Prior to Admission medications Medication Sig Start Date End Date Taking? Authorizing Provider  
eszopiclone (LUNESTA) 2 mg tablet Take 1 Tab by mouth nightly as needed for Sleep. Max Daily Amount: 2 mg. 8/31/18  Yes Duong Murrieta MD  
atorvastatin (LIPITOR) 40 mg tablet TAKE ONE TABLET BY MOUTH NIGHTLY 7/6/18  Yes LEYLA Cuevas MD  
aspirin 81 mg chewable tablet Take 1 Tab by mouth daily. 11/29/17  Yes LEYLA Cuevas MD  
hydrochlorothiazide (HYDRODIURIL) 25 mg tablet Take 25 mg by mouth daily. Indications: HYPERTENSION   Yes Historical Provider Allergies Allergen Reactions  Hydrocodone Other (comments)  
  jittery  Lisinopril Cough Review of Systems: A comprehensive review of systems was negative except for that written in the History of Present Illness. Objective:  
 
Visit Vitals  /85  Pulse 86  Temp 97.7 °F (36.5 °C)  Resp 13  Ht 5' 9\" (1.753 m)  Wt 80.7 kg (178 lb)  SpO2 99%  BMI 26.29 kg/m2 Physical Exam: nad Chest clear Heart reg 
abd soft Imaging:  images and reports reviewed Lab Review:  No results found for this or any previous visit (from the past 24 hour(s)). Labs and radiology: images and reports reviewed Assessment: Hx luis m Plan: 1. I recommend proceeding with colonoscopy. Treatment alternatives were discussed. 2. Discussed aspects of surgical intervention, methods, risks (including by not limited to infection, bleeding, hematoma, and perforation of the intestines or solid organs), and the risks of general anesthetic.  The patient understands the risks; any and all questions were answered to the patient's satisfaction. Signed By: Chasidy Martel MD   
 October 9, 2018

## 2018-10-09 NOTE — PERIOP NOTES
Anesthesia reports 140mg Propofol, and 500mL NS given during procedure. Received report from anesthesia staff on vital signs and status of patient.

## 2018-10-09 NOTE — DISCHARGE INSTRUCTIONS
Lilliana [place]  [id number]  1935    COLON DISCHARGE INSTRUCTIONS  Discomfort:  Redness at IV site- apply warm compress to area; if redness or soreness persist- contact your physician  There may be a slight amount of blood passed from the rectum  Gaseous discomfort- walking, belching will help relieve any discomfort  You may not operate a vehicle for 12 hours  You may not engage in an occupation involving machinery or appliances for rest of today  You may not drink alcoholic beverages for at least 12 hours  Avoid making any critical decisions for at least 24 hour  DIET:   High fiber diet. - however -  remember your colon is empty and a heavy meal will produce gas. Avoid these foods:  vegetables, fried / greasy foods, carbonated drinks for today    MEDICATIONS:  resume       ACTIVITY:  You may resume your normal daily activities it is recommended that you spend the remainder of the day resting -  avoid any strenuous activity. CALL M.D. ANY SIGN OF:   Increasing pain, nausea, vomiting  Abdominal distension (swelling)  New increased bleeding (oral or rectal)  Fever (chills)  Pain in chest area  Bloody discharge from nose or mouth  Shortness of breath     Follow-up Instructions:   Call Gladis Weldon MD if any questions or problems. Telephone # [phone]  Biopsy results will be available in  7 to10 days  Should have a repeat colonoscopy in 5 years. COLONOSCOPY FINDINGS:  Your colonoscopy showed: diverticulosis, hemorrhoids.

## 2018-10-09 NOTE — IP AVS SNAPSHOT
[address] [facility] 
[phone] Patient: Juan Welsh MRN: [id number] TMJ:[id number]/[id number] About your hospitalization You were admitted on:  October 9, 2018 You last received care in the:  [facility] [place] END[facility] You were discharged on:  October 9, 2018 Why you were hospitalized Your primary diagnosis was:  Not on File Follow-up Information Follow up With Details Comments Contact Info MD Edmundo Dickey [address] [facility] 
[phone] Your Scheduled Appointments Tuesday October 23, 2018  9:30 AM EDT Follow Up with MD Samia Dickey [address] ([facility]) Edmundo [address] [address] [phone] Discharge Orders None A check fritz indicates which time of day the medication should be taken. My Medications CONTINUE taking these medications Instructions Each Dose to Equal  
 Morning Noon Evening Bedtime  
 aspirin 81 mg chewable tablet Your last dose was: Your next dose is: Take 1 Tab by mouth daily. 81 mg  
    
   
   
   
  
 atorvastatin 40 mg tablet Commonly known as:  LIPITOR Your last dose was: Your next dose is: TAKE ONE TABLET BY MOUTH NIGHTLY  
     
   
   
   
  
 eszopiclone 2 mg tablet Commonly known as:  Alejandro Crew Your last dose was: Your next dose is: Take 1 Tab by mouth nightly as needed for Sleep. Max Daily Amount: 2 mg.  
 2 mg  
    
   
   
   
  
 hydroCHLOROthiazide 25 mg tablet Commonly known as:  HYDRODIURIL Your last dose was: Your next dose is: Take 25 mg by mouth daily. Indications: HYPERTENSION  
 25 mg Discharge Instructions Juan Welsh [id number] 
1935 COLON DISCHARGE INSTRUCTIONS Discomfort: 
Redness at IV site- apply warm compress to area; if redness or soreness persist- contact your physician There may be a slight amount of blood passed from the rectum Gaseous discomfort- walking, belching will help relieve any discomfort You may not operate a vehicle for 12 hours You may not engage in an occupation involving machinery or appliances for rest of today You may not drink alcoholic beverages for at least 12 hours Avoid making any critical decisions for at least 24 hour DIET: 
 High fiber diet.  however -  remember your colon is empty and a heavy meal will produce gas. Avoid these foods:  vegetables, fried / greasy foods, carbonated drinks for today MEDICATIONS: 
resume ACTIVITY: 
You may resume your normal daily activities it is recommended that you spend the remainder of the day resting -  avoid any strenuous activity. CALL M.D. ANY SIGN OF: Increasing pain, nausea, vomiting Abdominal distension (swelling) New increased bleeding (oral or rectal) Fever (chills) Pain in chest area Bloody discharge from nose or mouth Shortness of breath Follow-up Instructions: 
 Call Sherrilee Closs, MD if any questions or problems. Telephone # [phone] Biopsy results will be available in  7 to10 days Should have a repeat colonoscopy in 5 years. COLONOSCOPY FINDINGS: 
Your colonoscopy showed: diverticulosis, hemorrhoids. ACO Transitions of Care [place] [address] Gloria Anderson offers a voluntary care coordination program to provide high quality service and care to [facility] fee-for-service beneficiaries. Foster Rodriguez was designed to help you enhance your health and well-being through the following services: ? Transitions of Care  support for individuals who are transitioning from one care setting to another (example: Hospital to home). ? Chronic and Complex Care Coordination  support for individuals and caregivers of those with serious or chronic illnesses or with more than one chronic (ongoing) condition and those who take a number of different medications. If you meet specific medical criteria, a [facility] [address] may call you directly to coordinate your care with your primary care physician and your other care providers. For questions about the [facility] programs, please, contact your physicians office. For general questions or additional information about Accountable Care Organizations: 
Please visit www.medicare.gov/acos. html or call 1-800-MEDICARE (4-838.139.3752) TTY users should call 7-414.698.4978. Introducing [facility]! [facility] introduces [employer] patient portal. Now you can access parts of your medical record, email your doctor's office, and request medication refills online. 1. In your internet browser, go to [email or web address] 2. Click on the First Time User? Click Here link in the Sign In box. You will see the New Member Sign Up page. 3. Enter your [employer] Access Code exactly as it appears below. You will not need to use this code after youve completed the sign-up process. If you do not sign up before the expiration date, you must request a new code. · [employer] Access Code: [email or web address] Expires: 1/7/2019  2:15 PM 
 
4. Enter the last four digits of your Social Security Number (xxxx) and Date of Birth (mm/dd/yyyy) as indicated and click Submit. You will be taken to the next sign-up page. 5. Create a [employer] ID. This will be your [employer] login ID and cannot be changed, so think of one that is secure and easy to remember. 6. Create a [employer] password. You can change your password at any time. 7. Enter your Password Reset Question and Answer. This can be used at a later time if you forget your password. 8. Enter your e-mail address. You will receive e-mail notification when new information is available in [address] 9. Click Sign Up. You can now view and download portions of your medical record. 10. Click the Download Summary menu link to download a portable copy of your medical information. If you have questions, please visit the Frequently Asked Questions section of the [employer] website. Remember, [employer] is NOT to be used for urgent needs. For medical emergencies, dial 911. Now available from your iPhone and Android! Introducing Dallas Powell As a [facility] patient, I wanted to make you aware of our electronic visit tool called Dallas Powell. [employer] allows you to connect within minutes with a medical provider 24 hours a day, seven days a week via a mobile device or tablet or logging into a secure website from your computer. You can access Dallas Powell from anywhere in the [place]. A virtual visit might be right for you when you have a simple condition and feel like you just dont want to get out of bed, or cant get away from work for an appointment, when your regular [facility] provider is not available (evenings, weekends or holidays), or when youre out of town and need minor care. Electronic visits cost only $49 and if the [employer] provider determines a prescription is needed to treat your condition, one can be electronically transmitted to a nearby pharmacy*. Please take a moment to enroll today if you have not already done so. The enrollment process is free and takes just a few minutes. To enroll, please download the [employer] florencio to your tablet or phone, or visit [email or web address] to enroll on your computer.    
And, as an [address] [place] [address] patient with a [employer] account, the results of your visits will be scanned into your electronic medical record and your primary care provider will be able to view the scanned results. We urge you to continue to see your regular [facility] provider for your ongoing medical care. And while your primary care provider may not be the one available when you seek a Dallas Leavittdavidfin virtual visit, the peace of mind you get from getting a real diagnosis real time can be priceless. For more information on "[employer]davidfin, view our Frequently Asked Questions (FAQs) at [email or web address]. Sincerely, 
 
Domingo York MD 
Chief Medical Officer Valeria Anderson *:  certain medications cannot be prescribed via Dallas Treeyasmani Providers Seen During Your Hospitalization Provider Specialty Primary office phone Erica Linder MD Colon and Rectal Surgery [phone] Your Primary Care Physician (PCP) Primary Care Physician Office Phone Office Fax Mabel Germain  You are allergic to the following Allergen Reactions Hydrocodone Other (comments)  
 jittery Lisinopril Cough Recent Documentation Height Weight BMI Smoking Status 1.753 m 80.7 kg 26.29 kg/m2 Former Smoker Emergency Contacts Name Discharge Info Relation Home Work Mobile Meagan Roger DISCHARGE CAREGIVER [3] Spouse [3] [phone] Patient Belongings The following personal items are in your possession at time of discharge: 
  Dental Appliances: None  Visual Aid: None Please provide this summary of care documentation to your next provider. Signatures-by signing, you are acknowledging that this After Visit Summary has been reviewed with you and you have received a copy. Patient Signature:  ____________________________________________________________ Date:  ____________________________________________________________  
  
Ramesh Patel  Provider Signature: ____________________________________________________________ Date:  ____________________________________________________________

## 2018-10-09 NOTE — ROUTINE PROCESS
Michell Barger 1935 
[id number] Situation: 
Verbal report received from: Cruz Lombardo Procedure: Procedure(s): 
COLONOSCOPY Background: 
 
Preoperative diagnosis: HISTORY OF POLYPS Postoperative diagnosis: Diverticulosis, hemorrhoids :  Dr. Radha Cuello Assistant(s): Endoscopy Technician-1: Gris Reese Endoscopy RN-1: Liliam Beth RN Specimens: * No specimens in log * H. Pylori  no Assessment: 
Intra-procedure medications Anesthesia gave intra-procedure sedation and medications, see anesthesia flow sheet yes Intravenous fluids: NS@ Bert Tran Vital signs stable Abdominal assessment: round and soft Recommendation: 
Discharge patient per MD order. Family or Friend Permission to share finding with family or friend yes

## 2018-10-09 NOTE — BRIEF OP NOTE
BRIEF OPERATIVE NOTE Date of Procedure: 10/9/2018 Preoperative Diagnosis: HISTORY OF POLYPS Postoperative Diagnosis: Diverticulosis, hemorrhoids Procedure(s): 
COLONOSCOPY Surgeon(s) and Role: 
   * Chad Varma MD - Primary Surgical Assistant:  
 
Surgical Staff: 
Endoscopy Technician-1: Birgit Stubbs Endoscopy RN-1: Curly Marino RN No case tracking events are documented in the log. Anesthesia: MAC Estimated Blood Loss: none Specimens: * No specimens in log * Findings: sigmoid diverticulosis, hemorrhoids Complications: none apparent Implants: * No implants in log *

## 2018-10-15 RX ORDER — ATORVASTATIN CALCIUM 40 MG/1
40 TABLET, FILM COATED ORAL DAILY
Qty: 90 TAB | Refills: 3 | Status: SHIPPED | OUTPATIENT
Start: 2018-10-15 | End: 2019-10-10 | Stop reason: SDUPTHER

## 2018-10-15 NOTE — TELEPHONE ENCOUNTER
Requested Prescriptions     Pending Prescriptions Disp Refills    atorvastatin (LIPITOR) 40 mg tablet 90 Tab 3     Sig: Take 1 Tab by mouth daily.        Last refill 07/16/18  Next Appointment:10/23/18

## 2018-10-23 ENCOUNTER — OFFICE VISIT (OUTPATIENT)
Dept: INTERNAL MEDICINE CLINIC | Age: 83
End: 2018-10-23

## 2018-10-23 VITALS
SYSTOLIC BLOOD PRESSURE: 110 MMHG | DIASTOLIC BLOOD PRESSURE: 70 MMHG | WEIGHT: 187.4 LBS | HEIGHT: 69 IN | HEART RATE: 69 BPM | BODY MASS INDEX: 27.76 KG/M2 | OXYGEN SATURATION: 97 % | RESPIRATION RATE: 16 BRPM

## 2018-10-23 DIAGNOSIS — Z13.31 SCREENING FOR DEPRESSION: ICD-10-CM

## 2018-10-23 DIAGNOSIS — Z79.899 ON STATIN THERAPY: ICD-10-CM

## 2018-10-23 DIAGNOSIS — E78.00 PURE HYPERCHOLESTEROLEMIA: ICD-10-CM

## 2018-10-23 DIAGNOSIS — I67.9 CEREBROVASCULAR DISEASE: ICD-10-CM

## 2018-10-23 DIAGNOSIS — Z12.5 SPECIAL SCREENING FOR MALIGNANT NEOPLASM OF PROSTATE: ICD-10-CM

## 2018-10-23 DIAGNOSIS — Z13.39 SCREENING FOR ALCOHOLISM: ICD-10-CM

## 2018-10-23 DIAGNOSIS — Z00.00 MEDICARE ANNUAL WELLNESS VISIT, SUBSEQUENT: Primary | ICD-10-CM

## 2018-10-23 DIAGNOSIS — N30.00 ACUTE CYSTITIS WITHOUT HEMATURIA: ICD-10-CM

## 2018-10-23 DIAGNOSIS — I10 HYPERTENSION, UNSPECIFIED TYPE: ICD-10-CM

## 2018-10-23 RX ORDER — CIPROFLOXACIN 500 MG/1
500 TABLET ORAL 2 TIMES DAILY
Qty: 14 TAB | Refills: 0 | Status: SHIPPED | OUTPATIENT
Start: 2018-10-23 | End: 2019-05-16 | Stop reason: ALTCHOICE

## 2018-10-23 NOTE — PROGRESS NOTES
1. Have you been to the ER, urgent care clinic since your last visit? Hospitalized since your last visit? No 
 
2. Have you seen or consulted any other health care providers outside of the [address] [place] [address] since your last visit? Include any pap smears or colon screening. No  
 
Chief Complaint Patient presents with [facility] Annual Wellness Visit Depression Risk Factor Screening: PHQ over the last two weeks 6/28/2018 Little interest or pleasure in doing things Not at all Feeling down, depressed, irritable, or hopeless Not at all Total Score PHQ 2 0 Functional Ability and Level of Safety: Activities of Daily Living ADL Assessment 10/23/2018 Feeding yourself No Help Needed Getting from bed to chair No Help Needed Getting dressed No Help Needed Bathing or showering No Help Needed Walk across the room (includes cane/walker) No Help Needed Using the telphone No Help Needed Taking your medications No Help Needed Preparing meals No Help Needed Managing money (expenses/bills) No Help Needed Moderately strenuous housework (laundry) No Help Needed Shopping for personal items (toiletries/medicines) No Help Needed Shopping for groceries No Help Needed Driving No Help Needed Climbing a flight of stairs No Help Needed Getting to places beyond walking distances No Help Needed Fall Risk Fall Risk Assessment, last 12 mths 4/10/2018 Able to walk? Yes Fall in past 12 months? No  
 
 
Abuse Screen Abuse Screening Questionnaire 10/23/2018 Do you ever feel afraid of your partner? Radha Mandril Are you in a relationship with someone who physically or mentally threatens you? Radha Mandril Is it safe for you to go home? Abraham Chu Patient Care Team  
Patient Care Team: 
Robin Bautista MD as PCP - General (Internal Medicine)

## 2018-10-23 NOTE — PATIENT INSTRUCTIONS
Medicare Wellness Visit, Male The best way to live healthy is to have a lifestyle where you eat a well-balanced diet, exercise regularly, limit alcohol use, and quit all forms of tobacco/nicotine, if applicable. Regular preventive services are another way to keep healthy. Preventive services (vaccines, screening tests, monitoring & exams) can help personalize your care plan, which helps you manage your own care. Screening tests can find health problems at the earliest stages, when they are easiest to treat. [facility] follows the current, evidence-based guidelines published by the [facility] Sekou ([facility]) when recommending preventive services for our patients. Because we follow these guidelines, sometimes recommendations change over time as research supports it. (For example, a prostate screening blood test is no longer routinely recommended for men with no symptoms.) Of course, you and your doctor may decide to screen more often for some diseases, based on your risk and co-morbidities (chronic disease you are already diagnosed with). Preventive services for you include: - Medicare offers their members a free annual wellness visit, which is time for you and your primary care provider to discuss and plan for your preventive service needs. Take advantage of this benefit every year! 
-All adults over age 72 should receive the recommended pneumonia vaccines. Current USPSTF guidelines recommend a series of two vaccines for the best pneumonia protection.  
-All adults should have a flu vaccine yearly and an ECG.  All adults age 61 and older should receive a shingles vaccine once in their lifetime.   
-All adults age 38-68 who are overweight should have a diabetes screening test once every three years.  
-Other screening tests & preventive services for persons with diabetes include: an eye exam to screen for diabetic retinopathy, a kidney function test, a foot exam, and stricter control over your cholesterol.  
-Cardiovascular screening for adults with routine risk involves an electrocardiogram (ECG) at intervals determined by the provider.  
-Colorectal cancer screening should be done for adults age 54-65 with no increased risk factors for colorectal cancer. There are a number of acceptable methods of screening for this type of cancer. Each test has its own benefits and drawbacks. Discuss with your provider what is most appropriate for you during your annual wellness visit. The different tests include: colonoscopy (considered the best screening method), a fecal occult blood test, a fecal DNA test, and sigmoidoscopy. 
-All adults born between [facility] should be screened once for Hepatitis C. 
-An Abdominal Aortic Aneurysm (AAA) Screening is recommended for men age 73-68 who has ever smoked in their lifetime. Here is a list of your current Health Maintenance items (your personalized list of preventive services) with a due date: 
Health Maintenance Due Topic Date Due  Glaucoma Screening   12/03/2000  Pneumococcal Vaccine (2 of 2 - PPSV23) 01/01/2016  Flu Vaccine  08/01/2018 [address] [place] [address] Annual Well Visit  10/15/2018 All Medicare beneficiaries aged 48 and older are covered; however, when a beneficiary is at high risk, there is no minimum age required to receive a screening colonoscopy or a barium enema rendered as an alternative to a screening colonoscopy. The following are the coverage criteria for each colorectal cancer screening test/procedure. Screening FOBT Medicare provides coverage of a screening FOBT annually (i.e., at least 11 months have passed following the month in which the last covered screening FOBT was performed) for beneficiaries aged 48 and older. This screening requires a written order from the beneficiarys attending physician.  
 
NOTE: Medicare will only provide coverage for one FOBT per year: either HCPCS code  or CPT code [id number], but not both. Screening Colonoscopy For Beneficiaries at [facility] for Developing Colorectal Cancer Medicare provides coverage of a screening colonoscopy (HCPCS code ) once every 2 years for beneficiaries at high risk for developing colorectal cancer (i.e., at least 23 months have passed following the month in which the last covered screening colonoscopy [HCPCS code ] was performed). For Beneficiaries Not at [facility] Medicare provides coverage of a screening colonoscopy (HCPCS code ) for beneficiaries who do not meet the criteria for being at high risk for developing colorectal cancer once every 10 years (i.e., at least 119 months have passed following the month in which the last covered screening colonoscopy [HCPCS code ] was performed). If the beneficiary otherwise qualifies to have a covered screening colonoscopy (HCPCS code ) based on the above but has had a covered screening flexible sigmoidoscopy (HCPCS code ), then Medicare may cover a screening colonoscopy (HCPCS code ) only after at least 47 months have passed following the month in which the last covered screening flexible sigmoidoscopy (HCPCS code ) was performed.

## 2018-10-23 NOTE — PROGRESS NOTES
This is the Subsequent Medicare Annual Wellness Exam, performed 12 months or more after the Initial AWV or the last Subsequent AWV I have reviewed the patient's medical history in detail and updated the computerized patient record. History Past Medical History:  
Diagnosis Date  Allergic rhinitis 11/1/2017 Impression: trial of otc Zyrtec  Arthralgia 11/1/2017 Impression: left hip, possibly referred from spine  Arthritis 11/1/2017  Back pain 11/1/2017  Chest pain 11/1/2017 Impression: left shoulder/arm pain ?anginal equivalent  Cough 11/1/2017 Impression: suspect medication related, will follow  Fatigue 11/1/2017  High cholesterol  HTN (hypertension) 11/1/2017 Impression: stable on HCTZ  Hypertension   
 intermittent  Hypertriglyceridemia 11/1/2017  Insomnia 11/1/2017  LBBB (left bundle branch block)  Nasal polyp 11/1/2017  On statin therapy 10/23/2018  Prostate cancer screening 11/1/2017  Rash 11/1/2017  Stroke ([facility] [address] TIA  Wrist pain, acute, left 11/1/2017 Impression: mostly resolved, pt concerned this was heart related, more likely musculoskeletal or neuropathic, observe, if increases/persists follow up Past Surgical History:  
Procedure Laterality Date  HX CIRCUMCISION  2013  HX MOHS PROCEDURES Left  NEUROLOGICAL PROCEDURE UNLISTED \"pinched nerve neck\" Current Outpatient Medications Medication Sig Dispense Refill  ciprofloxacin HCl (CIPRO) 500 mg tablet Take 1 Tab by mouth two (2) times a day. 14 Tab 0  
 atorvastatin (LIPITOR) 40 mg tablet Take 1 Tab by mouth daily. 90 Tab 3  
 eszopiclone (LUNESTA) 2 mg tablet Take 1 Tab by mouth nightly as needed for Sleep. Max Daily Amount: 2 mg. 30 Tab 0  
 aspirin 81 mg chewable tablet Take 1 Tab by mouth daily. 30 Tab 6  
 hydrochlorothiazide (HYDRODIURIL) 25 mg tablet Take 25 mg by mouth daily. Indications: HYPERTENSION Allergies Allergen Reactions  Hydrocodone Other (comments)  
  jittery  Lisinopril Cough Family History Problem Relation Age of Onset  Dementia Mother  Heart Disease Father Social History Tobacco Use  Smoking status: Former Smoker Packs/day: 1.00 Years: 3.00 Pack years: 3.00 Last attempt to quit: 1960 Years since quittin.8  Smokeless tobacco: Never Used Substance Use Topics  Alcohol use: Yes Alcohol/week: 4.2 oz Types: 7 Cans of beer per week Patient Active Problem List  
Diagnosis Code  Insomnia G47.00  Wrist pain, acute, left M25.532  Allergic rhinitis J30.9  Acute gout of right foot M10.9  Arthralgia M25.50  Nasal polyp J33.9  Back pain M54.9  Cough R05  Chest pain R07.9  Arthritis M19.90  
 Hypertriglyceridemia E78.1  Fatigue R53.83  
 HTN (hypertension) I10  
 Rash R21  
 Prostate cancer screening Z12.5  TIA (transient ischemic attack) G45.9  Bilateral carotid artery stenosis I65.23  
 Dysarthria due to recent cerebrovascular accident (CVA) M82.966  Cerebrovascular disease I67.9  Pure hypercholesterolemia E78.00  
 On statin therapy Z79.899 Depression Risk Factor Screening: PHQ over the last two weeks 2018 Little interest or pleasure in doing things Not at all Feeling down, depressed, irritable, or hopeless Not at all Total Score PHQ 2 0 Alcohol Risk Factor Screening: You do not drink alcohol or very rarely. Functional Ability and Level of Safety:  
Hearing Loss Hearing is good. Activities of Daily Living The home contains: no safety equipment. Patient does total self care Fall Risk Fall Risk Assessment, last 12 mths 4/10/2018 Able to walk? Yes Fall in past 12 months? No  
 
 
Abuse Screen Patient is not abused Cognitive Screening Evaluation of Cognitive Function: 
Has your family/caregiver stated any concerns about your memory: no 
Normal 
 
 Patient Care Team  
Patient Care Team: 
Atif Booth MD as PCP - General (Internal Medicine) Assessment/Plan Education and counseling provided: 
Are appropriate based on today's review and evaluation Diagnoses and all orders for this visit: 
 
1. Medicare annual wellness visit, subsequent 2. Hypertension, unspecified type -     METABOLIC PANEL, COMPREHENSIVE; Future 3. Cerebrovascular disease 4. Pure hypercholesterolemia -     LIPID PANEL; Future 5. On statin therapy 
-     CK; Future -     METABOLIC PANEL, COMPREHENSIVE; Future 6. Screening for alcoholism -     [place] ANNUAL ALCOHOL SCREEN 15 MIN 
 
7. [facility] 
[facility]. Special screening for malignant neoplasm of prostate -     [place] PROSTATE CA SCREENING; TAMMY 
-     PSA SCREENING (SCREENING); Future 9. Acute cystitis without hematuria Other orders 
-     ciprofloxacin HCl (CIPRO) 500 mg tablet; Take 1 Tab by mouth two (2) times a day. Health Maintenance Due Topic Date Due  GLAUCOMA SCREENING Q2Y  12/03/2000  Pneumococcal 65+ Low/Medium Risk (2 of 2 - PPSV23) 01/01/2016  Influenza Age 5 to Adult  08/01/2018  MEDICARE YEARLY EXAM  10/15/2018 This note will not be viewable in [address] Reid Vargas is a 80 y.o. male and presents with Annual Wellness Visit Fransico Thompson Subjective: 
Mr. Lissy Cardenas presents today for annual wellness visit and follow-up of history of cerebrovascular disease, hyperlipidemia, and hypertension. He is doing well on his current medical regimen. He denies any side effects from his medicine. He said no headache, shortness breath, PND, orthopnea, or pedal edema. He had presented with insomnia earlier in the year and took medicine for this initially but states this is completely resolved. Past Medical History:  
Diagnosis Date  Allergic rhinitis 11/1/2017 Impression: trial of otc Zyrtec  Arthralgia 11/1/2017 Impression: left hip, possibly referred from spine  Arthritis 11/1/2017  Back pain 11/1/2017  Chest pain 11/1/2017 Impression: left shoulder/arm pain ?anginal equivalent  Cough 11/1/2017 Impression: suspect medication related, will follow  Fatigue 11/1/2017  High cholesterol  HTN (hypertension) 11/1/2017 Impression: stable on HCTZ  Hypertension   
 intermittent  Hypertriglyceridemia 11/1/2017  Insomnia 11/1/2017  LBBB (left bundle branch block)  Nasal polyp 11/1/2017  On statin therapy 10/23/2018  Prostate cancer screening 11/1/2017  Rash 11/1/2017  Stroke ([facility] [address] TIA  Wrist pain, acute, left 11/1/2017 Impression: mostly resolved, pt concerned this was heart related, more likely musculoskeletal or neuropathic, observe, if increases/persists follow up Past Surgical History:  
Procedure Laterality Date  HX CIRCUMCISION  2013  HX MOHS PROCEDURES Left  NEUROLOGICAL PROCEDURE UNLISTED \"pinched nerve neck\" Allergies Allergen Reactions  Hydrocodone Other (comments)  
  jittery  Lisinopril Cough Current Outpatient Medications Medication Sig Dispense Refill  ciprofloxacin HCl (CIPRO) 500 mg tablet Take 1 Tab by mouth two (2) times a day. 14 Tab 0  
 atorvastatin (LIPITOR) 40 mg tablet Take 1 Tab by mouth daily. 90 Tab 3  
 eszopiclone (LUNESTA) 2 mg tablet Take 1 Tab by mouth nightly as needed for Sleep. Max Daily Amount: 2 mg. 30 Tab 0  
 aspirin 81 mg chewable tablet Take 1 Tab by mouth daily. 30 Tab 6  
 hydrochlorothiazide (HYDRODIURIL) 25 mg tablet Take 25 mg by mouth daily. Indications: HYPERTENSION Social History Socioeconomic History  Marital status:  Spouse name: Not on file  Number of children: Not on file  Years of education: Not on file  Highest education level: Not on file Social Needs  Financial resource strain: Not on file  Food insecurity - worry: Not on file  Food insecurity - inability: Not on file  Transportation needs - medical: Not on file  Transportation needs - non-medical: Not on file Occupational History  Not on file Tobacco Use  Smoking status: Former Smoker Packs/day: 1.00 Years: 3.00 Pack years: 3.00 Last attempt to quit: 1960 Years since quittin.8  Smokeless tobacco: Never Used Substance and Sexual Activity  Alcohol use: Yes Alcohol/week: 4.2 oz Types: 7 Cans of beer per week  Drug use: No  
 Sexual activity: Not on file Other Topics Concern  Not on file Social History Narrative  Not on file Family History Problem Relation Age of Onset  Dementia Mother  Heart Disease Father Review of Systems Constitutional:  negative for fevers, chills, anorexia and weight loss Eyes:    negative for visual disturbance and irritation ENT:    negative for tinnitus,sore throat,nasal congestion,ear pains. hoarseness Respiratory:     negative for cough, hemoptysis, dyspnea,wheezing CV:    negative for chest pain, palpitations, lower extremity edema GI:    negative for nausea, vomiting, diarrhea, abdominal pain,melena Endo:               negative for polyuria,polydipsia,polyphagia,heat intolerance Genitourinary : negative for  hematuria, positive for some dysuria present for 2 days no fever chills or rigors, no back pain Integumentary: negative for rash and pruritus Hematologic:   negative for easy bruising and gum/nose bleeding Musculoskel:  negative for myalgias, arthralgias, back pain, muscle weakness, joint pain Neurological:   negative for headaches, dizziness, vertigo, memory problems and gait Behavl/Psych:  negative for feelings of anxiety, depression, mood changes ROS otherwise negative Objective:Visit Vitals /70 (BP 1 Location: Left arm, BP Patient Position: Sitting) Pulse 69 Resp 16 Ht 5' 9\" (1.753 m) Wt 187 lb 6.4 oz (85 kg) SpO2 97% BMI 27.67 kg/m² Physical Exam:  
General appearance - alert, well appearing, and in no distress Mental status - alert, oriented to person, place, and time EYE-KESHIA, EOMI, fundi normal, corneas normal, no foreign bodies ENT-ENT exam normal, no neck nodes or sinus tenderness Nose - normal and patent, no erythema, discharge or polyps Mouth - mucous membranes moist, pharynx normal without lesions Neck - supple, no significant adenopathy Chest - clear to auscultation, no wheezes, rales or rhonchi, symmetric air entry Heart - normal rate, regular rhythm, normal S1, S2, no murmurs, rubs, clicks or gallops Abdomen - soft, nontender, nondistended, no masses or organomegaly Lymph- no adenopathy palpable Ext-peripheral pulses normal, no pedal edema, no clubbing or cyanosis Skin-Warm and dry. no hyperpigmentation, vitiligo, or suspicious lesions Neuro -alert, oriented, normal speech, no focal findings or movement disorder noted Assessment/Plan: 
Diagnoses and all orders for this visit: 
 
1. Medicare annual wellness visit, subsequent 2. Hypertension, unspecified type -     METABOLIC PANEL, COMPREHENSIVE; Future 3. Cerebrovascular disease 4. Pure hypercholesterolemia -     LIPID PANEL; Future 5. On statin therapy 
-     CK; Future -     METABOLIC PANEL, COMPREHENSIVE; Future 6. Screening for alcoholism -     [place] ANNU[place] ALCOH[place] SCREEN 15 MIN 
 
7. Screening for depression 
-     [place] [id number] 8. Special screening for malignant neoplasm of prostate -     [place] PROSTATE CA SCREENING; TAMMY 
-     PSA SCREENING (SCREENING); Future 9. Acute cystitis without hematuria Other orders 
-     ciprofloxacin HCl (CIPRO) 500 mg tablet; Take 1 Tab by mouth two (2) times a day. ICD-10-CM ICD-9-CM 1. Medicare annual wellness visit, subsequent Z00.00 V70.0 2.  Hypertension, unspecified type B95 573.4 METABOLIC PANEL, COMPREHENSIVE  
 3. Cerebrovascular disease I67.9 437.9 4. Pure hypercholesterolemia E78.00 272.0 LIPID PANEL  
5. On statin therapy Z79.899 V58.69 CK METABOLIC PANEL, COMPREHENSIVE 6. Screening for alcoholism Z13.39 V79.1 [place] ANNUAL ALCOHOL SCREEN 15 MIN  
7. Screening for [facility] Z13.31 [facility]79.0 [facility]. Special screening for malignant neoplasm of prostate Z12.5 V76.44 [place] PROSTATE CA SCREENING; TAMMY  
   PSA SCREENING (SCREENING) 9. Acute cystitis without hematuria N30.00 595.0 Plan: The patient will continue his current medical regimen as outlined above. Start empirically on Cipro pending urine analysis and culture. Continue medications otherwise as prescribed. Follow-up Disposition: 
Return in about 6 months (around 4/23/2019). I have reviewed with the patient details of the assessment and plan and all questions were answered. Relevent patient education was performed. Verbal and/or written instructions (see AVS) provided. The most recent lab findings were reviewed with the patient. Plan was discussed with patient who verbally expressed understanding. An After Visit Summary was printed and given to the patient.  
 
Dash Wilson MD

## 2018-10-30 ENCOUNTER — APPOINTMENT (OUTPATIENT)
Dept: INTERNAL MEDICINE CLINIC | Age: 83
End: 2018-10-30

## 2018-10-30 DIAGNOSIS — I10 HYPERTENSION, UNSPECIFIED TYPE: ICD-10-CM

## 2018-10-30 DIAGNOSIS — Z79.899 ON STATIN THERAPY: ICD-10-CM

## 2018-10-30 DIAGNOSIS — Z12.5 SPECIAL SCREENING FOR MALIGNANT NEOPLASM OF PROSTATE: ICD-10-CM

## 2018-10-30 DIAGNOSIS — E78.00 PURE HYPERCHOLESTEROLEMIA: ICD-10-CM

## 2018-10-31 LAB
ALBUMIN SERPL-MCNC: 4.3 G/DL (ref 3.5–4.7)
ALBUMIN/GLOB SERPL: 1.7 {RATIO} (ref 1.2–2.2)
ALP SERPL-CCNC: 80 IU/L (ref 39–117)
ALT SERPL-CCNC: 16 IU/L (ref 0–44)
AST SERPL-CCNC: 16 IU/L (ref 0–40)
BILIRUB SERPL-MCNC: 0.6 MG/DL (ref 0–1.2)
BUN SERPL-MCNC: 15 MG/DL (ref 8–27)
BUN/CREAT SERPL: 17 (ref 10–24)
CALCIUM SERPL-MCNC: 9.3 MG/DL (ref 8.6–10.2)
CHLORIDE SERPL-SCNC: 101 MMOL/L (ref 96–106)
CHOLEST SERPL-MCNC: 109 MG/DL (ref 100–199)
CK SERPL-CCNC: 83 U/L (ref 24–204)
CO2 SERPL-SCNC: 27 MMOL/L (ref 20–29)
CREAT SERPL-MCNC: 0.9 MG/DL (ref 0.76–1.27)
GLOBULIN SER CALC-MCNC: 2.6 G/DL (ref 1.5–4.5)
GLUCOSE SERPL-MCNC: 104 MG/DL (ref 65–99)
HDLC SERPL-MCNC: 41 MG/DL
LDLC SERPL CALC-MCNC: 45 MG/DL (ref 0–99)
POTASSIUM SERPL-SCNC: 4 MMOL/L (ref 3.5–5.2)
PROT SERPL-MCNC: 6.9 G/DL (ref 6–8.5)
PSA SERPL-MCNC: 0.6 NG/ML (ref 0–4)
SODIUM SERPL-SCNC: 141 MMOL/L (ref 134–144)
TRIGL SERPL-MCNC: 113 MG/DL (ref 0–149)
VLDLC SERPL CALC-MCNC: 23 MG/DL (ref 5–40)

## 2019-05-16 ENCOUNTER — OFFICE VISIT (OUTPATIENT)
Dept: INTERNAL MEDICINE CLINIC | Age: 84
End: 2019-05-16

## 2019-05-16 VITALS
RESPIRATION RATE: 16 BRPM | WEIGHT: 193 LBS | BODY MASS INDEX: 28.58 KG/M2 | DIASTOLIC BLOOD PRESSURE: 78 MMHG | TEMPERATURE: 97.9 F | SYSTOLIC BLOOD PRESSURE: 128 MMHG | HEIGHT: 69 IN | HEART RATE: 72 BPM | OXYGEN SATURATION: 97 %

## 2019-05-16 DIAGNOSIS — Z79.899 ON STATIN THERAPY: ICD-10-CM

## 2019-05-16 DIAGNOSIS — I10 HYPERTENSION, UNSPECIFIED TYPE: Primary | ICD-10-CM

## 2019-05-16 DIAGNOSIS — E78.00 PURE HYPERCHOLESTEROLEMIA: ICD-10-CM

## 2019-05-16 DIAGNOSIS — I67.9 CEREBROVASCULAR DISEASE: ICD-10-CM

## 2019-05-16 LAB
A-G RATIO,AGRAT: 1.5 RATIO
ALBUMIN SERPL-MCNC: 4.3 G/DL (ref 3.9–5.4)
ALP SERPL-CCNC: 119 U/L (ref 38–126)
ALT SERPL-CCNC: 25 U/L (ref 9–52)
ANION GAP SERPL CALC-SCNC: 13 MMOL/L
AST SERPL W P-5'-P-CCNC: 20 U/L (ref 14–36)
BILIRUB SERPL-MCNC: 0.6 MG/DL (ref 0.2–1.3)
BUN SERPL-MCNC: 19 MG/DL (ref 9–20)
BUN/CREATININE RATIO,BUCR: 21 RATIO
CALCIUM SERPL-MCNC: 9.4 MG/DL (ref 8.4–10.2)
CHLORIDE SERPL-SCNC: 102 MMOL/L (ref 98–107)
CK SERPL-CCNC: 113 U/L (ref 30–135)
CO2 SERPL-SCNC: 25 MMOL/L (ref 22–32)
CREAT SERPL-MCNC: 0.9 MG/DL (ref 0.8–1.5)
GLOBULIN,GLOB: 2.8
GLUCOSE SERPL-MCNC: 88 MG/DL (ref 75–110)
POTASSIUM SERPL-SCNC: 4 MMOL/L (ref 3.6–5)
PROT SERPL-MCNC: 7.1 G/DL (ref 6.3–8.2)
SODIUM SERPL-SCNC: 140 MMOL/L (ref 137–145)

## 2019-05-16 NOTE — PROGRESS NOTES
Rell Kulkarni presents today at the clinic for Chief Complaint Patient presents with  Hypertension  
  follow up  Cholesterol Problem  
  follow up Wt Readings from Last 3 Encounters:  
05/16/19 193 lb (87.5 kg) 10/23/18 187 lb 6.4 oz (85 kg) 10/09/18 178 lb (80.7 kg) Temp Readings from Last 3 Encounters:  
05/16/19 97.9 °F (36.6 °C) (Oral) 10/09/18 97.8 °F (36.6 °C)  
08/31/18 97.8 °F (36.6 °C) (Oral) BP Readings from Last 3 Encounters:  
05/16/19 128/78  
10/23/18 110/70  
10/09/18 166/72 Pulse Readings from Last 3 Encounters:  
05/16/19 72  
10/23/18 69  
10/09/18 84 Health Maintenance Due Topic  GLAUCOMA SCREENING Q2Y  Pneumococcal 65+ years (2 of 2 - PCV13)  Shingrix Vaccine Age 50> (2 of 2) Learning Assessment: 
:  
 
Learning Assessment 6/28/2018 12/20/2017 12/8/2017 PRIMARY LEARNER Patient Patient Patient HIGHEST LEVEL OF EDUCATION - PRIMARY LEARNER  - - SOME COLLEGE  
BARRIERS PRIMARY LEARNER - - NONE  
CO-LEARNER CAREGIVER - - No  
PRIMARY LANGUAGE ENGLISH ENGLISH ENGLISH  
LEARNER PREFERENCE PRIMARY DEMONSTRATION DEMONSTRATION DEMONSTRATION  
ANSWERED BY patient patient patient RELATIONSHIP SELF SELF SELF Depression Screening: 
:  
 
3 most recent PHQ Screens 5/16/2019 Little interest or pleasure in doing things Not at all Feeling down, depressed, irritable, or hopeless Not at all Total Score PHQ 2 0 Fall Risk Assessment: 
:  
 
Fall Risk Assessment, last 12 mths 5/16/2019 Able to walk? Yes Fall in past 12 months? No  
 
 
Abuse Screening: 
:  
 
Abuse Screening Questionnaire 5/16/2019 10/23/2018 12/8/2017 Do you ever feel afraid of your partner? N N N Are you in a relationship with someone who physically or mentally threatens you? N N N Is it safe for you to go home? Rupa Shankar Coordination of Care Questionnaire: 
:  
 
1. Have you been to the ER, urgent care clinic since your last visit? Hospitalized since your last visit? no 
 
2. Have you seen or consulted any other health care providers outside of the [address] [place] [address] since your last visit? Include any pap smears or colon screening.  no

## 2019-05-16 NOTE — PROGRESS NOTES
This note will not be viewable in [address] William Perdomo is a 80 y.o. male and presents with Hypertension (follow up) and Cholesterol Problem (follow up) Suzy Yu Subjective: 
 
Mr. Eleazar Torres presents today for follow-up of hypertension, hyperlipidemia, and monitoring of statin therapy. He has a remote history of CVA. He has no residual deficits. He has no shortness of breath, chest pain, palpitations, PND, orthopnea, or pedal edema. He denies any side effects from his medications. He remains on hydrochlorthiazide and atorvastatin. Review of Systems Constitutional:  
Eyes:   negative for visual disturbance and irritation ENT:   negative for tinnitus,sore throat,nasal congestion,ear pains. hoarseness Respiratory:  negative for cough, hemoptysis, dyspnea,wheezing CV:   negative for chest pain, palpitations, lower extremity edema GI:   negative for nausea, vomiting, diarrhea, abdominal pain,melena Endo:               negative for polyuria,polydipsia,polyphagia,heat intolerance Genitourinary: negative for frequency, dysuria and hematuria Integumentary: negative for rash and pruritus Hematologic:  negative for easy bruising and gum/nose bleeding Musculoskel: negative for myalgias, arthralgias, back pain, muscle weakness, joint pain Neurological:  negative for headaches, dizziness, vertigo, memory problems and gait Behavl/Psych: negative for feelings of anxiety, depression, mood changes Past Medical History:  
Diagnosis Date  Allergic rhinitis 11/1/2017 Impression: trial of otc Zyrtec  Arthralgia 11/1/2017 Impression: left hip, possibly referred from spine  Arthritis 11/1/2017  Back pain 11/1/2017  Chest pain 11/1/2017 Impression: left shoulder/arm pain ?anginal equivalent  Cough 11/1/2017 Impression: suspect medication related, will follow  Fatigue 11/1/2017  High cholesterol  HTN (hypertension) 11/1/2017 Impression: stable on HCTZ  Hypertension intermittent  Hypertriglyceridemia 2017  Insomnia 2017  LBBB (left bundle branch block)  Nasal polyp 2017  On statin therapy 10/23/2018  Prostate cancer screening 2017  Rash 2017  Stroke ([facility] [address] TIA  Wrist pain, acute, left 2017 Impression: mostly resolved, pt concerned this was heart related, more likely musculoskeletal or neuropathic, observe, if increases/persists follow up Past Surgical History:  
Procedure Laterality Date  COLONOSCOPY N/A 10/9/2018 COLONOSCOPY performed by Tavo Bentley MD at [facility] ENDOSCOPY  
 HX CIRCUMCISION    HX MOHS PROCEDURES Left  NEUROLOGICAL PROCEDURE UNLISTED \"pinched nerve neck\" Social History Socioeconomic History  Marital status:  Spouse name: Not on file  Number of children: Not on file  Years of education: Not on file  Highest education level: Not on file Tobacco Use  Smoking status: Former Smoker Packs/day: 1.00 Years: 3.00 Pack years: 3.00 Last attempt to quit: 1960 Years since quittin.4  Smokeless tobacco: Never Used Substance and Sexual Activity  Alcohol use: Yes Alcohol/week: 4.2 oz Types: 7 Cans of beer per week  Drug use: No  
 
Family History Problem Relation Age of Onset  Dementia Mother  Heart Disease Father Current Outpatient Medications Medication Sig Dispense Refill  atorvastatin (LIPITOR) 40 mg tablet Take 1 Tab by mouth daily. 90 Tab 3  
 hydrochlorothiazide (HYDRODIURIL) 25 mg tablet Take 25 mg by mouth daily. Indications: HYPERTENSION  eszopiclone (LUNESTA) 2 mg tablet Take 1 Tab by mouth nightly as needed for Sleep. Max Daily Amount: 2 mg. 30 Tab 0  
 aspirin 81 mg chewable tablet Take 1 Tab by mouth daily. 30 Tab 6 Allergies Allergen Reactions  Hydrocodone Other (comments)  
  jittery  Lisinopril Cough Objective: 
Visit Vitals /78 (BP 1 Location: Left arm, BP Patient Position: Sitting) Pulse 72 Temp 97.9 °F (36.6 °C) (Oral) Resp 16 Ht 5' 9\" (1.753 m) Wt 193 lb (87.5 kg) SpO2 97% BMI 28.50 kg/m² Physical Exam:  
General appearance - alert, well appearing, and in no distress Mental status - alert, oriented to person, place, and time EYE-KESHIA, EOMI, fundi normal, corneas normal, no foreign bodies ENT-ENT exam normal, no neck nodes or sinus tenderness Nose - normal and patent, no erythema, discharge or polyps Mouth - mucous membranes moist, pharynx normal without lesions Neck - supple, no significant adenopathy Chest - clear to auscultation, no wheezes, rales or rhonchi, symmetric air entry Heart - normal rate, regular rhythm, normal S1, S2, no murmurs, rubs, clicks or gallops Abdomen - soft, nontender, nondistended, no masses or organomegaly Lymph- no adenopathy palpable Ext-peripheral pulses normal, no pedal edema, no clubbing or cyanosis Skin-Warm and dry. no hyperpigmentation, vitiligo, or suspicious lesions Neuro -alert, oriented, normal speech, no focal findings or movement disorder noted Musculoskeletal- FROM, no bony abnormalities, no point tenderness No results found for this visit on 05/16/19. All results for lab orders may not have been returned by the time this encountered was closed. Assessment/Plan: ICD-10-CM ICD-9-CM 1. Hypertension, unspecified type H40 413.9 METABOLIC PANEL, COMPREHENSIVE 2. Cerebrovascular disease I67.9 437.9 3. Pure hypercholesterolemia E78.00 272.0   
4. On statin therapy Z79.899 V58.69 CK METABOLIC PANEL, COMPREHENSIVE Orders Placed This Encounter  CK ([facility]House)  METABOLIC PANEL, COMPREHENSIVE ([facility]) Follow-up and Dispositions · Return in about 6 months (around 11/16/2019) for [address] Plan: 
 
Continue current medical regimen as outlined above.   Further recommendations based on labs as ordered. Cholesterol profile was reviewed with him from the fall and was excellent at that time. Return to clinic in 6 months. I have reviewed with the patient details of the assessment and plan and all questions were answered. Relevent patient education was performed. Verbal and/or written instructions (see AVS) provided. The most recent lab findings were reviewed with the patient. Plan was discussed with patient who verbal expressed understanding. An After Visit Summary was printed and given to the patient.  
 
 
Rashid Padilla MD

## 2019-09-24 PROBLEM — Z12.5 PROSTATE CANCER SCREENING: Status: RESOLVED | Noted: 2017-11-01 | Resolved: 2019-09-24

## 2019-10-11 RX ORDER — ATORVASTATIN CALCIUM 40 MG/1
TABLET, FILM COATED ORAL
Qty: 90 TAB | Refills: 2 | Status: SHIPPED | OUTPATIENT
Start: 2019-10-11 | End: 2021-01-12

## 2019-11-06 ENCOUNTER — HOSPITAL ENCOUNTER (OUTPATIENT)
Dept: MRI IMAGING | Age: 84
Discharge: HOME | End: 2019-11-06
Attending: ORTHOPAEDIC SURGERY
Payer: MEDICARE

## 2019-11-06 DIAGNOSIS — M79.601 PAIN OF RIGHT ARM: ICD-10-CM

## 2019-11-06 DIAGNOSIS — M75.41 IMPINGEMENT SYNDROME OF RIGHT SHOULDER: ICD-10-CM

## 2019-11-06 PROCEDURE — 73221 MRI JOINT UPR EXTREM W/O DYE: CPT

## 2019-11-19 ENCOUNTER — OFFICE VISIT (OUTPATIENT)
Dept: INTERNAL MEDICINE CLINIC | Age: 84
End: 2019-11-19

## 2019-11-19 VITALS
OXYGEN SATURATION: 96 % | TEMPERATURE: 98.6 F | BODY MASS INDEX: 29.01 KG/M2 | RESPIRATION RATE: 16 BRPM | HEIGHT: 68 IN | SYSTOLIC BLOOD PRESSURE: 116 MMHG | HEART RATE: 65 BPM | DIASTOLIC BLOOD PRESSURE: 74 MMHG | WEIGHT: 191.4 LBS

## 2019-11-19 DIAGNOSIS — Z13.31 SCREENING FOR DEPRESSION: ICD-10-CM

## 2019-11-19 DIAGNOSIS — E78.00 PURE HYPERCHOLESTEROLEMIA: ICD-10-CM

## 2019-11-19 DIAGNOSIS — I10 HYPERTENSION, UNSPECIFIED TYPE: ICD-10-CM

## 2019-11-19 DIAGNOSIS — Z13.39 SCREENING FOR ALCOHOLISM: ICD-10-CM

## 2019-11-19 DIAGNOSIS — Z00.00 MEDICARE ANNUAL WELLNESS VISIT, SUBSEQUENT: Primary | ICD-10-CM

## 2019-11-19 DIAGNOSIS — Z79.899 ON STATIN THERAPY: ICD-10-CM

## 2019-11-19 LAB
A-G RATIO,AGRAT: 1.4 RATIO
ALBUMIN SERPL-MCNC: 4.2 G/DL (ref 3.9–5.4)
ALP SERPL-CCNC: 85 U/L (ref 38–126)
ALT SERPL-CCNC: 24 U/L (ref 0–50)
ANION GAP SERPL CALC-SCNC: 10 MMOL/L
AST SERPL W P-5'-P-CCNC: 26 U/L (ref 14–36)
BILIRUB SERPL-MCNC: 1.1 MG/DL (ref 0.2–1.3)
BILIRUB UR QL: NEGATIVE
BUN SERPL-MCNC: 17 MG/DL (ref 9–20)
BUN/CREATININE RATIO,BUCR: 19 RATIO
CALCIUM SERPL-MCNC: 9.9 MG/DL (ref 8.4–10.2)
CHLORIDE SERPL-SCNC: 101 MMOL/L (ref 98–107)
CHOL/HDL RATIO,CHHD: 3 RATIO (ref 0–4)
CHOLEST SERPL-MCNC: 108 MG/DL (ref 0–200)
CK SERPL-CCNC: 101 U/L (ref 30–135)
CLARITY: CLEAR
CO2 SERPL-SCNC: 30 MMOL/L (ref 22–32)
COLOR UR: NORMAL
CREAT SERPL-MCNC: 0.9 MG/DL (ref 0.8–1.5)
GLOBULIN,GLOB: 2.9
GLUCOSE 24H UR-MRATE: NEGATIVE G/(24.H)
GLUCOSE SERPL-MCNC: 105 MG/DL (ref 75–110)
HDLC SERPL-MCNC: 43 MG/DL (ref 35–130)
HGB UR QL STRIP: NEGATIVE
KETONES UR QL STRIP.AUTO: NEGATIVE
LDL/HDL RATIO,LDHD: 1 RATIO
LDLC SERPL CALC-MCNC: 41 MG/DL (ref 0–130)
LEUKOCYTE ESTERASE: NEGATIVE
NITRITE UR QL STRIP.AUTO: NEGATIVE
PH UR STRIP: 6 [PH] (ref 5–7)
POTASSIUM SERPL-SCNC: 4.8 MMOL/L (ref 3.6–5)
PROT SERPL-MCNC: 7.1 G/DL (ref 6.3–8.2)
PROT UR STRIP-MCNC: NEGATIVE MG/DL
SODIUM SERPL-SCNC: 141 MMOL/L (ref 137–145)
SP GR UR REFRACTOMETRY: 1.01 (ref 1–1.03)
TRIGL SERPL-MCNC: 121 MG/DL (ref 0–200)
UROBILINOGEN UR QL STRIP.AUTO: NEGATIVE
VLDLC SERPL CALC-MCNC: 24 MG/DL

## 2019-11-19 NOTE — PATIENT INSTRUCTIONS
Medicare Wellness Visit, Male The best way to live healthy is to have a lifestyle where you eat a well-balanced diet, exercise regularly, limit alcohol use, and quit all forms of tobacco/nicotine, if applicable. Regular preventive services are another way to keep healthy. Preventive services (vaccines, screening tests, monitoring & exams) can help personalize your care plan, which helps you manage your own care. Screening tests can find health problems at the earliest stages, when they are easiest to treat. [facility] follows the current, evidence-based guidelines published by the [facility] ([facility]) when recommending preventive services for our patients. Because we follow these guidelines, sometimes recommendations change over time as research supports it. (For example, a prostate screening blood test is no longer routinely recommended for men with no symptoms). Of course, you and your doctor may decide to screen more often for some diseases, based on your risk and co-morbidities (chronic disease you are already diagnosed with). Preventive services for you include: - Medicare offers their members a free annual wellness visit, which is time for you and your primary care provider to discuss and plan for your preventive service needs. Take advantage of this benefit every year! 
-All adults over age 72 should receive the recommended pneumonia vaccines. Current USPSTF guidelines recommend a series of two vaccines for the best pneumonia protection.  
-All adults should have a flu vaccine yearly and tetanus vaccine every 10 years. 
-All adults age 48 and older should receive the shingles vaccines (series of two vaccines).       
-All adults age 38-68 who are overweight should have a diabetes screening test once every three years.  
-Other screening tests & preventive services for persons with diabetes include: an eye exam to screen for diabetic retinopathy, a kidney function test, a foot exam, and stricter control over your cholesterol.  
-Cardiovascular screening for adults with routine risk involves an electrocardiogram (ECG) at intervals determined by the provider.  
-Colorectal cancer screening should be done for adults age 54-65 with no increased risk factors for colorectal cancer. There are a number of acceptable methods of screening for this type of cancer. Each test has its own benefits and drawbacks. Discuss with your provider what is most appropriate for you during your annual wellness visit. The different tests include: colonoscopy (considered the best screening method), a fecal occult blood test, a fecal DNA test, and sigmoidoscopy. 
-All adults born between [facility] should be screened once for Hepatitis C. 
-An Abdominal Aortic Aneurysm (AAA) Screening is recommended for men age 73-68 who has ever smoked in their lifetime. Here is a list of your current Health Maintenance items (your personalized list of preventive services) with a due date: 
Health Maintenance Due Topic Date Due  Glaucoma Screening   12/03/2000 Sydnie Annual Well Visit  10/24/2019

## 2019-11-19 NOTE — PROGRESS NOTES
Chief Complaint   Patient presents with    Annual Wellness Visit    Hypertension    Cholesterol Problem       Depression Risk Factor Screening:     3 most recent PHQ Screens 2019   Little interest or pleasure in doing things Not at all   Feeling down, depressed, irritable, or hopeless Not at all   Total Score PHQ 2 0       Functional Ability and Level of Safety:     Activities of Daily Living  ADL Assessment 2019   Feeding yourself No Help Needed   Getting from bed to chair No Help Needed   Getting dressed No Help Needed   Bathing or showering No Help Needed   Walk across the room (includes cane/walker) No Help Needed   Using the telphone No Help Needed   Taking your medications No Help Needed   Preparing meals No Help Needed   Managing money (expenses/bills) No Help Needed   Moderately strenuous housework (laundry) No Help Needed   Shopping for personal items (toiletries/medicines) No Help Needed   Shopping for groceries No Help Needed   Driving No Help Needed   Climbing a flight of stairs No Help Needed   Getting to places beyond walking distances No Help Needed       Fall Risk  Fall Risk Assessment, last 12 mths 2019   Able to walk? Yes   Fall in past 12 months? No       Abuse Screen  Abuse Screening Questionnaire 2019   Do you ever feel afraid of your partner? N   Are you in a relationship with someone who physically or mentally threatens you? N   Is it safe for you to go home? Y     Reviewed record in preparation for visit and have obtained necessary documentation. Identified pt with two pt identifiers(name and ).     Chief Complaint   Patient presents with    Annual Wellness Visit    Hypertension    Cholesterol Problem      Wt Readings from Last 3 Encounters:   19 191 lb 6.4 oz (86.8 kg)   19 193 lb (87.5 kg)   10/23/18 187 lb 6.4 oz (85 kg)     Temp Readings from Last 3 Encounters:   19 98.6 °F (37 °C) (Oral)   19 97.9 °F (36.6 °C) (Oral)   10/09/18 97.8 °F (36.6 °C)     BP Readings from Last 3 Encounters:   11/19/19 116/74   05/16/19 128/78   10/23/18 110/70     Pulse Readings from Last 3 Encounters:   11/19/19 65   05/16/19 72   10/23/18 69       Coordination of Care Questionnaire:  :     1) Have you been to an emergency room, urgent care clinic since your last visit? No     Hospitalized since your last visit? No               2) Have you seen or consulted any other health care providers outside of [address] [place] [address] since your last visit?   Yes Dr Augustus Wilburn            Patient Care Team   Patient Care Team:  Genie Swain MD as PCP - General (Internal Medicine)  Genie Swain [facility] as PCP [facility]

## 2019-11-19 NOTE — PROGRESS NOTES
This is the Subsequent Medicare Annual Wellness Exam, performed 12 months or more after the Initial AWV or the last Subsequent AWV    I have reviewed the patient's medical history in detail and updated the computerized patient record.      History     Patient Active Problem List   Diagnosis Code    Insomnia G47.00    Wrist pain, acute, left M25.532    Allergic rhinitis J30.9    Acute gout of right foot M10.9    Arthralgia M25.50    Nasal polyp J33.9    Back pain M54.9    Cough R05    Chest pain R07.9    Arthritis M19.90    Hypertriglyceridemia E78.1    Fatigue R53.83    HTN (hypertension) I10    Rash R21    TIA (transient ischemic attack) G45.9    Bilateral carotid artery stenosis I65.23    Dysarthria due to recent cerebrovascular accident (CVA) M32.149    Cerebrovascular disease I67.9    Pure hypercholesterolemia E78.00    On statin therapy Z79.899     Past Medical History:   Diagnosis Date    Allergic rhinitis 11/1/2017    Impression: trial of otc Zyrtec    Arthralgia 11/1/2017    Impression: left hip, possibly referred from spine    Arthritis 11/1/2017    Back pain 11/1/2017    Chest pain 11/1/2017    Impression: left shoulder/arm pain ?anginal equivalent    Cough 11/1/2017    Impression: suspect medication related, will follow    Fatigue 11/1/2017    High cholesterol     HTN (hypertension) 11/1/2017    Impression: stable on HCTZ    Hypertension     intermittent    Hypertriglyceridemia 11/1/2017    Insomnia 11/1/2017    LBBB (left bundle branch block)     Nasal polyp 11/1/2017    On statin therapy 10/23/2018    Prostate cancer screening 11/1/2017    Rash 11/1/2017    Stroke ([facility] [address]     TIA    Wrist pain, acute, left 11/1/2017    Impression: mostly resolved, pt concerned this was heart related, more likely musculoskeletal or neuropathic, observe, if increases/persists follow up      Past Surgical History:   Procedure Laterality Date    COLONOSCOPY N/A 10/9/2018    COL[facility]cl[facility]COPY    HX CIRCUMCISION  2013    HX MOHS PROCEDURES Left     NEUROLOGICAL PROCEDURE UNLISTED      \"pinched nerve neck\"     Current Outpatient Medications   Medication Sig Dispense Refill    diph,pertuss,acel,,tetanus vac,PF, (ADACEL) 2 Lf-(2.5-5-3-5 mcg)-5Lf/0.5 mL syrg vaccine 0.5 mL by IntraMUSCular route once for 1 dose. 0.5 mL 0    atorvastatin (LIPITOR) 40 mg tablet TAKE ONE TABLET BY MOUTH DAILY 90 Tab 2    hydrochlorothiazide (HYDRODIURIL) 25 mg tablet Take 25 mg by mouth daily. Indications: HYPERTENSION      eszopiclone (LUNESTA) 2 mg tablet Take 1 Tab by mouth nightly as needed for Sleep. Max Daily Amount: 2 mg. 30 Tab 0    aspirin 81 mg chewable tablet Take 1 Tab by mouth daily. 30 Tab 6     Allergies   Allergen Reactions    Hydrocodone Other (comments)     jittery    Lisinopril Cough       Family History   Problem Relation Age of Onset    Dementia Mother     Heart Disease Father      Social History     Tobacco Use    Smoking status: Former Smoker     Packs/day: 1.00     Years: 3.00     Pack years: 3.00     Last attempt to quit: 1960     Years since quittin.9    Smokeless tobacco: Never Used   Substance Use Topics    Alcohol use: Yes     Alcohol/week: 7.0 standard drinks     Types: 7 Cans of beer per week       Depression Risk Factor Screening:     3 most recent PHQ Screens 2019   Little interest or pleasure in doing things Not at all   Feeling down, depressed, irritable, or hopeless Not at all   Total Score PHQ 2 0       Alcohol Risk Factor Screening (MALE > 65): Do you average more 1 drink per night or more than 7 drinks a week: No    In the past three months have you have had more than 4 drinks containing alcohol on one occasion: No      Functional Ability and Level of Safety:   Hearing: The patient wears hearing aids. Activities of Daily Living: The home contains: no safety equipment.   Patient does total self care    Ambulation: with no difficulty    Fall Risk:  Fall Risk Assessment, last 12 mths 5/16/2019   Able to walk? Yes   Fall in past 12 months? No       Abuse Screen:  Patient is not abused    Cognitive Screening   Has your family/caregiver stated any concerns about your memory: no  Cognitive Screening: Normal - Verbal Fluency Test    Patient Care Team   Patient Care Team:  Myrtle Billings MD as PCP - General (Internal Medicine)  Myrtle Billings MD as PCP - [facility]    Assessment/Plan   Education and counseling provided:  Are appropriate based on today's review and evaluation    Diagnoses and all orders for this visit:    1. Medicare annual wellness visit, subsequent    2. Hypertension, unspecified type  -     METABOLIC PANEL, COMPREHENSIVE  -     URINALYSIS W/O MICRO    3. Pure hypercholesterolemia  -     LIPID PANEL    4. On statin therapy  -     CK  -     METABOLIC PANEL, COMPREHENSIVE    5. Screening for alcoholism  -     GA ANNUAL ALCOHOL SCREEN 15 MIN    6. Screening for depression  -     DEPRESSION SCREEN ANNUAL    Other orders  -     diph,pertuss,acel,,tetanus vac,PF, (ADACEL) 2 Lf-(2.5-5-3-5 mcg)-5Lf/0.5 mL syrg vaccine; 0.5 mL by IntraMUSCular route once for 1 dose. Health Maintenance Due   Topic Date Due    GLAUCOMA SCREENING Q2Y  12/03/2000    MEDICARE YEARLY EXAM  10/24/2019     This note will not be viewable in MyChart. Debra Stevens is a 80 y.o. male and presents with Annual Wellness Visit; Hypertension; and Cholesterol Problem  . Subjective:  Mr. Michelle Lockwood presents today for follow-up annual wellness visit as well as follow-up of hypertension, hyperlipidemia, and monitoring of statin therapy. He is doing well on his current medical regimen. He denies any side effects from his medications. He has no shortness of breath, chest pain, palpitations, PND, orthopnea, or pedal edema. He did receive a influenza vaccine at his local pharmacy.   He is previously received the Shingrix vaccine as well. Past Medical History:   Diagnosis Date    Allergic rhinitis 11/1/2017    Impression: trial of otc Zyrtec    Arthralgia 11/1/2017    Impression: left hip, possibly referred from spine    Arthritis 11/1/2017    Back pain 11/1/2017    Chest pain 11/1/2017    Impression: left shoulder/arm pain ?anginal equivalent    Cough 11/1/2017    Impression: suspect medication related, will follow    Fatigue 11/1/2017    High cholesterol     HTN (hypertension) 11/1/2017    Impression: stable on HCTZ    Hypertension     intermittent    Hypertriglyceridemia 11/1/2017    Insomnia 11/1/2017    LBBB (left bundle branch block)     Nasal polyp 11/1/2017    On statin therapy 10/23/2018    Prostate cancer screening 11/1/2017    Rash 11/1/2017    Stroke ([facility] [address]     TIA    Wrist pain, acute, left 11/1/2017    Impression: mostly resolved, pt concerned this was heart related, more likely musculoskeletal or neuropathic, observe, if increases/persists follow up     Past Surgical History:   Procedure Laterality Date    COLONOSCOPY N/A 10/9/2018    COLONOSCOPY performed by Darlyn [facility]bert MD at [facility] ENDOSCOPY    HX CIRCUMCISION  2013    HX MOHS PROCEDURES Left     NEUROLOGICAL PROCEDURE UNLISTED      \"pinched nerve neck\"     Allergies   Allergen Reactions    Hydrocodone Other (comments)     jittery    Lisinopril Cough     Current Outpatient Medications   Medication Sig Dispense Refill    diph,pertuss,acel,,tetanus vac,PF, (ADACEL) 2 Lf-(2.5-5-3-5 mcg)-5Lf/0.5 mL syrg vaccine 0.5 mL by IntraMUSCular route once for 1 dose. 0.5 mL 0    atorvastatin (LIPITOR) 40 mg tablet TAKE ONE TABLET BY MOUTH DAILY 90 Tab 2    hydrochlorothiazide (HYDRODIURIL) 25 mg tablet Take 25 mg by mouth daily. Indications: HYPERTENSION      eszopiclone (LUNESTA) 2 mg tablet Take 1 Tab by mouth nightly as needed for Sleep. Max Daily Amount: 2 mg. 30 Tab 0    aspirin 81 mg chewable tablet Take 1 Tab by mouth daily. [address] Tab 6     Social History     Socioeconomic History    Marital status:      Spouse name: Not on file    Number of children: Not on file    Years of education: Not on file    Highest education level: Not on file   Tobacco Use    Smoking status: Former Smoker     Packs/day: 1.00     Years: 3.00     Pack years: 3.00     Last attempt to quit: 1960     Years since quittin.9    Smokeless tobacco: Never Used   Substance and Sexual Activity    Alcohol use: Yes     Alcohol/week: 7.0 standard drinks     Types: 7 Cans of beer per week    Drug use: No     Family History   Problem Relation Age of Onset    Dementia Mother     Heart Disease Father        Review of Systems  Constitutional:  negative for fevers, chills, anorexia and weight loss  Eyes:    negative for visual disturbance and irritation  ENT:    negative for tinnitus,sore throat,nasal congestion,ear pains. hoarseness  Respiratory:     negative for cough, hemoptysis, dyspnea,wheezing  CV:    negative for chest pain, palpitations, lower extremity edema  GI:    negative for nausea, vomiting, diarrhea, abdominal pain,melena  Endo:               negative for polyuria,polydipsia,polyphagia,heat intolerance  Genitourinary : negative for frequency, dysuria and hematuria  Integumentary: negative for rash and pruritus  Hematologic:   negative for easy bruising and gum/nose bleeding  Musculoskel:  negative for myalgias, arthralgias, back pain, muscle weakness, joint pain  Neurological:   negative for headaches, dizziness, vertigo, memory problems and gait   Behavl/Psych:  negative for feelings of anxiety, depression, mood changes  ROS otherwise negative      Objective:  Visit Vitals  /74 (BP 1 Location: Left arm, BP Patient Position: Sitting)   Pulse 65   Temp 98.6 °F (37 °C) (Oral)   Resp 16   Ht 5' 7.75\" (1.721 m)   Wt 191 lb 6.4 oz (86.8 kg)   SpO2 96%   BMI 29.32 kg/m²     Physical Exam:   General appearance - alert, well appearing, and in no distress  Mental status - alert, oriented to person, place, and time  EYE-KESHIA, EOMI, fundi normal, corneas normal, no foreign bodies  ENT-ENT exam normal, no neck nodes or sinus tenderness  Nose - normal and patent, no erythema, discharge or polyps  Mouth - mucous membranes moist, pharynx normal without lesions  Neck - supple, no significant adenopathy   Chest - clear to auscultation, no wheezes, rales or rhonchi, symmetric air entry   Heart - normal rate, regular rhythm, normal S1, S2, no murmurs, rubs, clicks or gallops   Abdomen - soft, nontender, nondistended, no masses or organomegaly  Lymph- no adenopathy palpable  Ext-peripheral pulses normal, no pedal edema, no clubbing or cyanosis  Skin-Warm and dry. no hyperpigmentation, vitiligo, or suspicious lesions  Neuro -alert, oriented, normal speech, no focal findings or movement disorder noted      Assessment/Plan:  Diagnoses and all orders for this visit:    1. Medicare annual wellness visit, subsequent    2. Hypertension, unspecified type  -     METABOLIC PANEL, COMPREHENSIVE  -     URINALYSIS W/O MICRO    3. Pure hypercholesterolemia  -     LIPID PANEL    4. On statin therapy  -     CK  -     METABOLIC PANEL, COMPREHENSIVE    5. Screening for alcoholism  -     SC ANNUAL ALCOHOL SCREEN 15 MIN    6. Screening for depression  -     DEPRESSION SCREEN ANNUAL    Other orders  -     diph,pertuss,acel,,tetanus vac,PF, (ADACEL) 2 Lf-(2.5-5-3-5 mcg)-5Lf/0.5 mL syrg vaccine; 0.5 mL by IntraMUSCular route once for 1 dose. ICD-10-CM ICD-9-CM    1. Medicare annual wellness visit, subsequent Z00.00 V70.0    2. Hypertension, unspecified type H91 481.6 METABOLIC PANEL, COMPREHENSIVE      URINALYSIS W/O MICRO   3. Pure hypercholesterolemia E78.00 272.0 LIPID PANEL   4. On statin therapy Z79.899 W76.79 CK      METABOLIC PANEL, COMPREHENSIVE   5.  Screening for alcoholism Z13.39 V79.1 SC ANNUAL ALCOHOL SCREEN 15 MIN   6. Screening for depression Z13.31 V79.0 DEPRESSION SCREEN ANNUAL       Plan:    The patient's blood pressure appears to be well controlled on his current regimen. His cholesterol profile will be checked with a fasting lipid panel. His CK level and liver function test will also be monitored. Follow-up metabolic panel and urinalysis as well. Further recommendations based on lab results. I have reviewed with the patient details of the assessment and plan and all questions were answered. Relevent patient education was performed. Verbal and/or written instructions (see AVS) provided. The most recent lab findings were reviewed with the patient. Plan was discussed with patient who verbally expressed understanding. An After Visit Summary was printed and given to the patient.     Kirby Barros MD

## 2020-01-24 ENCOUNTER — TELEPHONE (OUTPATIENT)
Dept: INTERNAL MEDICINE CLINIC | Age: 85
End: 2020-01-24

## 2020-01-24 RX ORDER — ONDANSETRON 4 MG/1
4 TABLET, ORALLY DISINTEGRATING ORAL
Qty: 15 TAB | Refills: 0 | Status: SHIPPED | OUTPATIENT
Start: 2020-01-24 | End: 2020-06-22 | Stop reason: ALTCHOICE

## 2020-01-24 NOTE — TELEPHONE ENCOUNTER
Wife Stephane Ends calling stating her  Monica Bui is throwing up and diarrhea. She wanted to know if you could call in something to help with the nausea.      Pharmacy:  [id number]

## 2020-06-22 ENCOUNTER — OFFICE VISIT (OUTPATIENT)
Dept: INTERNAL MEDICINE CLINIC | Age: 85
End: 2020-06-22

## 2020-06-22 VITALS
TEMPERATURE: 98 F | WEIGHT: 180 LBS | HEART RATE: 62 BPM | HEIGHT: 68 IN | OXYGEN SATURATION: 97 % | RESPIRATION RATE: 16 BRPM | SYSTOLIC BLOOD PRESSURE: 120 MMHG | BODY MASS INDEX: 27.28 KG/M2 | DIASTOLIC BLOOD PRESSURE: 64 MMHG

## 2020-06-22 DIAGNOSIS — Z79.899 ON STATIN THERAPY: ICD-10-CM

## 2020-06-22 DIAGNOSIS — E78.00 PURE HYPERCHOLESTEROLEMIA: ICD-10-CM

## 2020-06-22 DIAGNOSIS — I10 HYPERTENSION, UNSPECIFIED TYPE: Primary | ICD-10-CM

## 2020-06-22 DIAGNOSIS — I67.9 CEREBROVASCULAR DISEASE: ICD-10-CM

## 2020-06-22 LAB
A-G RATIO,AGRAT: 1.5 RATIO
ALBUMIN SERPL-MCNC: 4.4 G/DL (ref 3.9–5.4)
ALP SERPL-CCNC: 92 U/L (ref 38–126)
ALT SERPL-CCNC: 23 U/L (ref 0–50)
ANION GAP SERPL CALC-SCNC: 9 MMOL/L
AST SERPL W P-5'-P-CCNC: 23 U/L (ref 14–36)
BILIRUB SERPL-MCNC: 1.1 MG/DL (ref 0.2–1.3)
BUN SERPL-MCNC: 17 MG/DL (ref 9–20)
BUN/CREATININE RATIO,BUCR: 17 RATIO
CALCIUM SERPL-MCNC: 9.9 MG/DL (ref 8.4–10.2)
CHLORIDE SERPL-SCNC: 102 MMOL/L (ref 98–107)
CHOL/HDL RATIO,CHHD: 2 RATIO (ref 0–4)
CHOLEST SERPL-MCNC: 117 MG/DL (ref 0–200)
CK SERPL-CCNC: 81 U/L (ref 30–135)
CO2 SERPL-SCNC: 27 MMOL/L (ref 22–32)
CREAT SERPL-MCNC: 1 MG/DL (ref 0.8–1.5)
GLOBULIN,GLOB: 3
GLUCOSE SERPL-MCNC: 98 MG/DL (ref 75–110)
HDLC SERPL-MCNC: 50 MG/DL (ref 35–130)
LDL/HDL RATIO,LDHD: 1 RATIO
LDLC SERPL CALC-MCNC: 43 MG/DL (ref 0–130)
POTASSIUM SERPL-SCNC: 3.9 MMOL/L (ref 3.6–5)
PROT SERPL-MCNC: 7.4 G/DL (ref 6.3–8.2)
SODIUM SERPL-SCNC: 138 MMOL/L (ref 137–145)
TRIGL SERPL-MCNC: 122 MG/DL (ref 0–200)
VLDLC SERPL CALC-MCNC: 24 MG/DL

## 2020-06-22 NOTE — PROGRESS NOTES
This note will not be viewable in [address] Rustam Vazquez is a 80 y.o. male and presents with Hypertension (follow up) and Cholesterol Problem (follow up)  . Subjective:  Mr. Maggie Wood presents today for follow-up of hypertension, cerebrovascular disease, hyperlipidemia, and monitoring statin therapy. He is doing well on his current medical regimen. He denies shortness of breath, chest pain, palpitations, PND, orthopnea, or pedal edema. Unfortunately his wife Nunu had complications after having a knee replacement including a saddle pulmonary embolism resulting in cardiac arrest with shock liver and acute kidney failure. She has been extubated and is slowly improving. The plan is for her to go to [facility] rehab. He is quite down about this especially since he cannot visit with her. Review of Systems  Constitutional:   Eyes:   negative for visual disturbance and irritation  ENT:   negative for tinnitus,sore throat,nasal congestion,ear pains. hoarseness  Respiratory:  negative for cough, hemoptysis, dyspnea,wheezing  CV:   negative for chest pain, palpitations, lower extremity edema  GI:   negative for nausea, vomiting, diarrhea, abdominal pain,melena  Endo:               negative for polyuria,polydipsia,polyphagia,heat intolerance  Genitourinary: negative for frequency, dysuria and hematuria  Integumentary: negative for rash and pruritus  Hematologic:  negative for easy bruising and gum/nose bleeding  Musculoskel: negative for myalgias, arthralgias, back pain, muscle weakness, joint pain  Neurological:  negative for headaches, dizziness, vertigo, memory problems and gait   Behavl/Psych: negative for feelings of anxiety, depression, mood changes    Past Medical History:   Diagnosis Date    Allergic rhinitis 11/1/2017    Impression: trial of otc Zyrtec    Arthralgia 11/1/2017    Impression: left hip, possibly referred from spine    Arthritis 11/1/2017    Back pain 11/1/2017    Chest pain 2017    Impression: left shoulder/arm pain ?anginal equivalent    Cough 2017    Impression: suspect medication related, will follow    Fatigue 2017    High cholesterol     HTN (hypertension) 2017    Impression: stable on HCTZ    Hypertension     intermittent    Hypertriglyceridemia 2017    Insomnia 2017    LBBB (left bundle branch block)     Nasal polyp 2017    On statin therapy 10/23/2018    Prostate cancer screening 2017    Rash 2017    Stroke ([facility] [address]     TIA    Wrist pain, acute, left 2017    Impression: mostly resolved, pt concerned this was heart related, more likely musculoskeletal or neuropathic, observe, if increases/persists follow up     Past Surgical History:   Procedure Laterality Date    COLONOSCOPY N/A 10/9/2018    COLON[facility] performed by Kimberly [facility]ares MD at [facility] ENDOSCOPY    HX CIRCUMCISION      HX MOHS PROCEDURES Left     NEUROLOGICAL PROCEDURE UNLISTED      \"pinched nerve neck\"     Social History     Socioeconomic History    Marital status:      Spouse name: Not on file    Number of children: Not on file    Years of education: Not on file    Highest education level: Not on file   Tobacco Use    Smoking status: Former Smoker     Packs/day: 1.00     Years: 3.00     Pack years: 3.00     Last attempt to quit: 1960     Years since quittin.5    Smokeless tobacco: Never Used   Substance and Sexual Activity    Alcohol use: Yes     Alcohol/week: 7.0 standard drinks     Types: 7 Cans of beer per week    Drug use: No     Family History   Problem Relation Age of Onset    Dementia Mother     Heart Disease Father      Current Outpatient Medications   Medication Sig Dispense Refill    atorvastatin (LIPITOR) 40 mg tablet TAKE ONE TABLET BY MOUTH DAILY 90 Tab 2    aspirin 81 mg chewable tablet Take 1 Tab by mouth daily. 30 Tab 6    hydrochlorothiazide (HYDRODIURIL) 25 mg tablet Take 25 mg by mouth daily.  Indications: HYPERTENSION      eszopiclone (LUNESTA) 2 mg tablet Take 1 Tab by mouth nightly as needed for Sleep. Max Daily Amount: 2 mg. 30 Tab 0     Allergies   Allergen Reactions    Hydrocodone Other (comments)     jittery    Lisinopril Cough       Objective:  Visit Vitals  /64 (BP 1 Location: Left arm, BP Patient Position: Sitting)   Pulse 62   Temp 98 °F (36.7 °C) (Oral)   Resp 16   Ht 5' 7.75\" (1.721 m)   Wt 180 lb (81.6 kg)   SpO2 97%   BMI 27.57 kg/m²     Physical Exam:   General appearance - alert, well appearing, and in no distress  Mental status - alert, oriented to person, place, and time  EYE-KESHIA, EOMI, fundi normal, corneas normal, no foreign bodies  ENT-ENT exam normal, no neck nodes or sinus tenderness  Nose - normal and patent, no erythema, discharge or polyps  Mouth - mucous membranes moist, pharynx normal without lesions  Neck - supple, no significant adenopathy   Chest - clear to auscultation, no wheezes, rales or rhonchi, symmetric air entry   Heart - normal rate, regular rhythm, normal S1, S2, no murmurs, rubs, clicks or gallops   Abdomen - soft, nontender, nondistended, no masses or organomegaly  Lymph- no adenopathy palpable  Ext-peripheral pulses normal, no pedal edema, no clubbing or cyanosis  Skin-Warm and dry. no hyperpigmentation, vitiligo, or suspicious lesions  Neuro -alert, oriented, normal speech, no focal findings or movement disorder noted  Musculoskeletal- FROM, no bony abnormalities, no point tenderness    No results found for this visit on 06/22/20. All results for lab orders may not have been returned by the time this encountered was closed. Assessment/Plan:       ICD-10-CM ICD-9-CM    1. Hypertension, unspecified type R47 937.4 METABOLIC PANEL, COMPREHENSIVE   2. Cerebrovascular disease I67.9 437.9    3. Pure hypercholesterolemia E78.00 272.0 CK      LIPID PANEL      METABOLIC PANEL, COMPREHENSIVE   4.  On statin therapy Z79.899 V58.69 CK      LIPID PANEL      METABOLIC PANEL, COMPREHENSIVE       Orders Placed This Encounter    CK ([facility])    LIPID PANEL ([facility])    METABOLIC PANEL, COMPREHENSIVE ([facility])       Plan:    Continue current medical regimen as outlined above. Further recommendations based on labs as ordered. Return to clinic in 6 months unless otherwise indicated. I have reviewed with the patient details of the assessment and plan and all questions were answered. Relevent patient education was performed. Verbal and/or written instructions (see AVS) provided. The most recent lab findings were reviewed with the patient. Plan was discussed with patient who verbal expressed understanding. An After Visit Summary was printed and given to the patient.       Tim Bettencourt MD

## 2020-06-22 NOTE — PROGRESS NOTES
Amy Tay presents today at the clinic for    Chief Complaint   Patient presents with    Hypertension     follow up    Cholesterol Problem     follow up        Wt Readings from Last 3 Encounters:   06/22/20 180 lb (81.6 kg)   11/19/19 191 lb 6.4 oz (86.8 kg)   05/16/19 193 lb (87.5 kg)     Temp Readings from Last 3 Encounters:   06/22/20 98 °F (36.7 °C) (Oral)   11/19/19 98.6 °F (37 °C) (Oral)   05/16/19 97.9 °F (36.6 °C) (Oral)     BP Readings from Last 3 Encounters:   06/22/20 120/64   11/19/19 116/74   05/16/19 128/78     Pulse Readings from Last 3 Encounters:   06/22/20 62   11/19/19 65   05/16/19 72       Health Maintenance Due   Topic    GLAUCOMA SCREENING Q2Y          Learning Assessment:  :     Learning Assessment 6/28/2018 12/20/2017 12/8/2017   PRIMARY LEARNER Patient Patient Patient   HIGHEST LEVEL OF EDUCATION - PRIMARY LEARNER  - - SOME COLLEGE   BARRIERS PRIMARY LEARNER - - NONE   CO-LEARNER CAREGIVER - - No   PRIMARY LANGUAGE ENGLISH ENGLISH ENGLISH   LEARNER PREFERENCE PRIMARY DEMONSTRATION DEMONSTRATION DEMONSTRATION   ANSWERED BY patient patient patient   RELATIONSHIP SELF SELF SELF       Depression Screening:  :     3 most recent PHQ Screens 6/22/2020   Little interest or pleasure in doing things Not at all   Feeling down, depressed, irritable, or hopeless Not at all   Total Score PHQ 2 0       Fall Risk Assessment:  :     Fall Risk Assessment, last 12 mths 6/22/2020   Able to walk? Yes   Fall in past 12 months? No       Abuse Screening:  :     Abuse Screening Questionnaire 6/22/2020 5/16/2019 10/23/2018 12/8/2017   Do you ever feel afraid of your partner? N N N N   Are you in a relationship with someone who physically or mentally threatens you? N N N N   Is it safe for you to go home? Shabnam Evans       Coordination of Care Questionnaire:  :     1. Have you been to the ER, urgent care clinic since your last visit? Hospitalized since your last visit? no    2.  Have you seen or consulted any other health care providers outside of the [address] [place] [address] since your last visit? Include any pap smears or colon screening.  no

## 2020-10-15 ENCOUNTER — TELEPHONE (OUTPATIENT)
Dept: INTERNAL MEDICINE CLINIC | Age: 85
End: 2020-10-15

## 2020-10-15 NOTE — TELEPHONE ENCOUNTER
Pt would like a prescription filled for test strips under [employer] [facility], he checks BS once a day. Please advise or call pt. Thank you. Please send to [phone]

## 2020-10-23 ENCOUNTER — TELEPHONE (OUTPATIENT)
Dept: INTERNAL MEDICINE CLINIC | Age: 85
End: 2020-10-23

## 2020-10-23 NOTE — TELEPHONE ENCOUNTER
Wagner Cook from [facility] calling to request a prescription to be sent to [address] [place] [address] for [employer] for the flu vaccine.

## 2020-12-21 ENCOUNTER — TELEPHONE (OUTPATIENT)
Dept: INTERNAL MEDICINE CLINIC | Age: 85
End: 2020-12-21

## 2020-12-21 DIAGNOSIS — F51.01 PRIMARY INSOMNIA: ICD-10-CM

## 2020-12-21 RX ORDER — ESZOPICLONE 2 MG/1
2 TABLET, FILM COATED ORAL
Qty: 30 TAB | Refills: 0 | Status: SHIPPED | OUTPATIENT
Start: 2020-12-21 | End: 2021-01-05 | Stop reason: SDUPTHER

## 2020-12-21 NOTE — TELEPHONE ENCOUNTER
Patient is having a hard time sleeping and want to know if Dr. Jae Garcia would call in a prescription to [facility] on [address] [place] [address] to help him sleep.

## 2021-01-05 ENCOUNTER — OFFICE VISIT (OUTPATIENT)
Dept: INTERNAL MEDICINE CLINIC | Age: 86
End: 2021-01-05
Payer: MEDICARE

## 2021-01-05 VITALS
HEIGHT: 68 IN | SYSTOLIC BLOOD PRESSURE: 134 MMHG | BODY MASS INDEX: 27.34 KG/M2 | HEART RATE: 60 BPM | OXYGEN SATURATION: 99 % | DIASTOLIC BLOOD PRESSURE: 70 MMHG | TEMPERATURE: 98.1 F | WEIGHT: 180.4 LBS | RESPIRATION RATE: 16 BRPM

## 2021-01-05 DIAGNOSIS — F41.1 GAD (GENERALIZED ANXIETY DISORDER): ICD-10-CM

## 2021-01-05 DIAGNOSIS — Z79.899 ON STATIN THERAPY: ICD-10-CM

## 2021-01-05 DIAGNOSIS — I10 HYPERTENSION, UNSPECIFIED TYPE: ICD-10-CM

## 2021-01-05 DIAGNOSIS — Z13.1 SCREENING FOR DIABETES MELLITUS: ICD-10-CM

## 2021-01-05 DIAGNOSIS — I67.9 CEREBROVASCULAR DISEASE: ICD-10-CM

## 2021-01-05 DIAGNOSIS — E78.00 PURE HYPERCHOLESTEROLEMIA: ICD-10-CM

## 2021-01-05 DIAGNOSIS — F51.01 PRIMARY INSOMNIA: ICD-10-CM

## 2021-01-05 DIAGNOSIS — N39.0 URINARY TRACT INFECTION WITHOUT HEMATURIA, SITE UNSPECIFIED: ICD-10-CM

## 2021-01-05 DIAGNOSIS — Z00.00 MEDICARE ANNUAL WELLNESS VISIT, SUBSEQUENT: Primary | ICD-10-CM

## 2021-01-05 DIAGNOSIS — Z13.31 SCREENING FOR DEPRESSION: ICD-10-CM

## 2021-01-05 DIAGNOSIS — Z13.6 SCREENING FOR ISCHEMIC HEART DISEASE: ICD-10-CM

## 2021-01-05 LAB
A-G RATIO,AGRAT: 1.5 RATIO
ALBUMIN SERPL-MCNC: 4.4 G/DL (ref 3.9–5.4)
ALP SERPL-CCNC: 83 U/L (ref 38–126)
ALT SERPL-CCNC: 21 U/L (ref 0–50)
ANION GAP SERPL CALC-SCNC: 12 MMOL/L
AST SERPL W P-5'-P-CCNC: 25 U/L (ref 14–36)
BACTERIA,BACTU: ABNORMAL
BILIRUB SERPL-MCNC: 0.7 MG/DL (ref 0.2–1.3)
BILIRUB UR QL: NEGATIVE
BUN SERPL-MCNC: 14 MG/DL (ref 9–20)
BUN/CREATININE RATIO,BUCR: 18 RATIO
CALCIUM SERPL-MCNC: 10 MG/DL (ref 8.4–10.2)
CHLORIDE SERPL-SCNC: 103 MMOL/L (ref 98–107)
CHOL/HDL RATIO,CHHD: 2 RATIO (ref 0–4)
CHOLEST SERPL-MCNC: 118 MG/DL (ref 0–200)
CK SERPL-CCNC: 111 U/L (ref 30–135)
CLARITY: CLEAR
CO2 SERPL-SCNC: 29 MMOL/L (ref 22–32)
COLOR UR: ABNORMAL
CREAT SERPL-MCNC: 0.8 MG/DL (ref 0.8–1.5)
GLOBULIN,GLOB: 2.9
GLUCOSE 24H UR-MRATE: NEGATIVE G/(24.H)
GLUCOSE SERPL-MCNC: 105 MG/DL (ref 75–110)
HDLC SERPL-MCNC: 51 MG/DL (ref 35–130)
HGB UR QL STRIP: ABNORMAL
KETONES UR QL STRIP.AUTO: NEGATIVE
LDL/HDL RATIO,LDHD: 1 RATIO
LDLC SERPL CALC-MCNC: 45 MG/DL (ref 0–130)
LEUKOCYTE ESTERASE: ABNORMAL
NITRITE UR QL STRIP.AUTO: NEGATIVE
PH UR STRIP: 7 [PH] (ref 5–7)
POTASSIUM SERPL-SCNC: 4.4 MMOL/L (ref 3.6–5)
PROT SERPL-MCNC: 7.3 G/DL (ref 6.3–8.2)
PROT UR STRIP-MCNC: ABNORMAL MG/DL
RBC #/AREA URNS HPF: ABNORMAL #/HPF
SODIUM SERPL-SCNC: 144 MMOL/L (ref 137–145)
SP GR UR REFRACTOMETRY: 1.01 (ref 1–1.03)
TRIGL SERPL-MCNC: 108 MG/DL (ref 0–200)
UROBILINOGEN UR QL STRIP.AUTO: ABNORMAL
VLDLC SERPL CALC-MCNC: 22 MG/DL
WBC URNS QL MICRO: ABNORMAL #/HPF

## 2021-01-05 PROCEDURE — 1101F PT FALLS ASSESS-DOCD LE1/YR: CPT | Performed by: INTERNAL MEDICINE

## 2021-01-05 PROCEDURE — G8510 SCR DEP NEG, NO PLAN REQD: HCPCS | Performed by: INTERNAL MEDICINE

## 2021-01-05 PROCEDURE — 82550 ASSAY OF CK (CPK): CPT | Performed by: INTERNAL MEDICINE

## 2021-01-05 PROCEDURE — G8754 DIAS BP LESS 90: HCPCS | Performed by: INTERNAL MEDICINE

## 2021-01-05 PROCEDURE — G8752 SYS BP LESS 140: HCPCS | Performed by: INTERNAL MEDICINE

## 2021-01-05 PROCEDURE — G8536 NO DOC ELDER MAL SCRN: HCPCS | Performed by: INTERNAL MEDICINE

## 2021-01-05 PROCEDURE — 80061 LIPID PANEL: CPT | Performed by: INTERNAL MEDICINE

## 2021-01-05 PROCEDURE — G8419 CALC BMI OUT NRM PARAM NOF/U: HCPCS | Performed by: INTERNAL MEDICINE

## 2021-01-05 PROCEDURE — 99214 OFFICE O/P EST MOD 30 MIN: CPT | Performed by: INTERNAL MEDICINE

## 2021-01-05 PROCEDURE — 80053 COMPREHEN METABOLIC PANEL: CPT | Performed by: INTERNAL MEDICINE

## 2021-01-05 PROCEDURE — G0439 PPPS, SUBSEQ VISIT: HCPCS | Performed by: INTERNAL MEDICINE

## 2021-01-05 PROCEDURE — 81003 URINALYSIS AUTO W/O SCOPE: CPT | Performed by: INTERNAL MEDICINE

## 2021-01-05 PROCEDURE — G8427 DOCREV CUR MEDS BY ELIG CLIN: HCPCS | Performed by: INTERNAL MEDICINE

## 2021-01-05 RX ORDER — ESZOPICLONE 2 MG/1
2 TABLET, FILM COATED ORAL
Qty: 30 TAB | Refills: 0 | Status: SHIPPED | OUTPATIENT
Start: 2021-01-05 | End: 2021-01-19 | Stop reason: SDUPTHER

## 2021-01-05 RX ORDER — SERTRALINE HYDROCHLORIDE 50 MG/1
50 TABLET, FILM COATED ORAL DAILY
Qty: 30 TAB | Refills: 5 | Status: SHIPPED | OUTPATIENT
Start: 2021-01-05 | End: 2021-06-20

## 2021-01-05 NOTE — PROGRESS NOTES
This note will not be viewable in [address] Joanie Lyles is a 80 y.o. male and presents with Hypertension and Annual Wellness Visit (6m)  . Subjective:  Mr. Mandi Leonard presents today for follow-up of hypertension, hyperlipidemia, monitoring statin therapy, and a Medicare annual wellness review. His wife has been chronically ill status post a CVA and has had chronic problems with her back resulting in weakness of her lower extremities which has been very difficult and she has basically been bedridden requiring 2 caregivers at home all the time. He has lost a significant mount of weight over the past few months. He notes that his eating is not as good as it used to be. He has had some anxiety at times and thinks he would like to take medication for this. He did have difficulty sleeping but responded nicely to Lunesta 2 mg nightly on an as-needed basis. He does not take this every night. He denies shortness of breath, chest pain, palpitations, PND, orthopnea, or pedal edema.      Past Medical History:   Diagnosis Date    Allergic rhinitis 11/1/2017    Impression: trial of otc Zyrtec    Arthralgia 11/1/2017    Impression: left hip, possibly referred from spine    Arthritis 11/1/2017    Back pain 11/1/2017    Chest pain 11/1/2017    Impression: left shoulder/arm pain ?anginal equivalent    Cough 11/1/2017    Impression: suspect medication related, will follow    Fatigue 11/1/2017    High cholesterol     HTN (hypertension) 11/1/2017    Impression: stable on HCTZ    Hypertension     intermittent    Hypertriglyceridemia 11/1/2017    Insomnia 11/1/2017    LBBB (left bundle branch block)     Nasal polyp 11/1/2017    On statin therapy 10/23/2018    Prostate cancer screening 11/1/2017    Rash 11/1/2017    Stroke ([facility] [address]     TIA    Wrist pain, acute, left 11/1/2017    Impression: mostly resolved, pt concerned this was heart related, more likely musculoskeletal or neuropathic, observe, if increases/persists follow up     Past Surgical History:   Procedure Laterality Date    COLONOSCOPY N/A 10/9/2018    COLONOSCOPY performed by Sergio Alford MD at [facility] ENDOSCOPY    HX CIRCUMCISION  2013    HX MOHS PROCEDURES Left     NEUROLOGICAL PROCEDURE UNLISTED      \"pinched nerve neck\"     Allergies   Allergen Reactions    Hydrocodone Other (comments)     jittery    Lisinopril Cough     Current Outpatient Medications   Medication Sig Dispense Refill    sertraline (ZOLOFT) 50 mg tablet Take 1 Tab by mouth daily. 30 Tab 5    eszopiclone (LUNESTA) 2 mg tablet Take 1 Tab by mouth nightly as needed for Sleep. Max Daily Amount: 2 mg. 30 Tab 0    atorvastatin (LIPITOR) 40 mg tablet TAKE ONE TABLET BY MOUTH DAILY 90 Tab 2    aspirin 81 mg chewable tablet Take 1 Tab by mouth daily. 30 Tab 6    hydrochlorothiazide (HYDRODIURIL) 25 mg tablet Take 25 mg by mouth daily. Indications: HYPERTENSION       Social History     Socioeconomic History    Marital status:      Spouse name: Not on file    Number of children: Not on file    Years of education: Not on file    Highest education level: Not on file   Tobacco Use    Smoking status: Former Smoker     Packs/day: 1.00     Years: 3.00     Pack years: 3.00     Quit date: 56     Years since quittin.0    Smokeless tobacco: Never Used   Substance and Sexual Activity    Alcohol use: Yes     Alcohol/week: 7.0 standard drinks     Types: 7 Cans of beer per week    Drug use: No     Family History   Problem Relation Age of Onset    Dementia Mother     Heart Disease Father        Review of Systems  Constitutional:  negative for fevers, chills, anorexia and weight loss  Eyes:    negative for visual disturbance and irritation  ENT:    negative for tinnitus,sore throat,nasal congestion,ear pains. hoarseness  Respiratory:     negative for cough, hemoptysis, dyspnea,wheezing  CV:    negative for chest pain, palpitations, lower extremity edema  GI:    negative for nausea, vomiting, diarrhea, abdominal pain,melena  Endo:               negative for polyuria,polydipsia,polyphagia,heat intolerance  Genitourinary : negative for frequency, dysuria and hematuria  Integumentary: negative for rash and pruritus  Hematologic:   negative for easy bruising and gum/nose bleeding  Musculoskel:  negative for myalgias, arthralgias, back pain, muscle weakness, joint pain  Neurological:   negative for headaches, dizziness, vertigo, memory problems and gait   Behavl/Psych:  negative for feelings of anxiety, depression, mood changes  ROS otherwise negative      Objective:  Visit Vitals  /70 (BP 1 Location: Left arm, BP Patient Position: Sitting)   Pulse 60   Temp 98.1 °F (36.7 °C) (Oral)   Resp 16   Ht 5' 7.75\" (1.721 m)   Wt 180 lb 6.4 oz (81.8 kg)   SpO2 99%   BMI 27.63 kg/m²     Physical Exam:   General appearance - alert, well appearing, and in no distress  Mental status - alert, oriented to person, place, and time  EYE-KESHIA, EOMI, fundi normal, corneas normal, no foreign bodies  ENT-ENT exam normal, no neck nodes or sinus tenderness  Nose - normal and patent, no erythema, discharge or polyps  Mouth - mucous membranes moist, pharynx normal without lesions  Neck - supple, no significant adenopathy   Chest - clear to auscultation, no wheezes, rales or rhonchi, symmetric air entry   Heart - normal rate, regular rhythm, normal S1, S2, no murmurs, rubs, clicks or gallops   Abdomen - soft, nontender, nondistended, no masses or organomegaly  Lymph- no adenopathy palpable  Ext-peripheral pulses normal, no pedal edema, no clubbing or cyanosis  Skin-Warm and dry. no hyperpigmentation, vitiligo, or suspicious lesions  Neuro -alert, oriented, normal speech, no focal findings or movement disorder noted      Assessment/Plan:  Diagnoses and all orders for this visit:    1. Hypertension, unspecified type  -     METABOLIC PANEL, COMPREHENSIVE  -     URINALYSIS W/O MICRO    2.  Cerebrovascular disease    3. Pure hypercholesterolemia  -     CK  -     LIPID PANEL    4. On statin therapy  -     CK  -     LIPID PANEL    5. DAKOTA (generalized anxiety disorder)    6. Primary insomnia  -     eszopiclone (LUNESTA) 2 mg tablet; Take 1 Tab by mouth nightly as needed for Sleep. Max Daily Amount: 2 mg. Other orders  -     sertraline (ZOLOFT) 50 mg tablet; Take 1 Tab by mouth daily. ICD-10-CM ICD-9-CM    1. Hypertension, unspecified type  A82 025.5 METABOLIC PANEL, COMPREHENSIVE      URINALYSIS W/O MICRO   2. Cerebrovascular disease  I67.9 437.9    3. Pure hypercholesterolemia  E78.00 272.0 CK      LIPID PANEL   4. On statin therapy  Z79.899 V58.69 CK      LIPID PANEL   5. DAKOTA (generalized anxiety disorder)  F41.1 300.02    6. Primary insomnia  F51.01 307.42 eszopiclone (LUNESTA) 2 mg tablet       Plan:    Problems as outlined above currently stable. We will add sertraline 50 mg daily for generalized anxiety. He will continue Lunesta nightly as needed for insomnia. Further recommendations based on labs as ordered. I have reviewed with the patient details of the assessment and plan and all questions were answered. Relevent patient education was performed. Verbal and/or written instructions (see AVS) provided. The most recent lab findings were reviewed with the patient. Plan was discussed with patient who verbally expressed understanding. An After Visit Summary was printed and given to the patient. Nandini Browne MD        This is the Subsequent Medicare Annual Wellness Exam, performed 12 months or more after the Initial AWV or the last Subsequent AWV    I have reviewed the patient's medical history in detail and updated the computerized patient record.      Depression Risk Factor Screening:     3 most recent PHQ Screens 1/5/2021   Little interest or pleasure in doing things Not at all   Feeling down, depressed, irritable, or hopeless Not at all   Total Score PHQ 2 0       Alcohol Risk Screen Do you average more than 1 drink per night or more than 7 drinks a week: No    In the past three months have you have had more than 4 drinks containing alcohol on one occasion: No        Functional Ability and Level of Safety:    Hearing: Hearing is good. Activities of Daily Living: The home contains: no safety equipment. Patient does total self care      Ambulation: with no difficulty     Fall Risk:  Fall Risk Assessment, last 12 mths 1/5/2021   Able to walk? Yes   Fall in past 12 months? 0   Do you feel unsteady? 0   Are you worried about falling 0      Abuse Screen:  Patient is not abused       Cognitive Screening    Has your family/caregiver stated any concerns about your memory: no     Cognitive Screening: Normal - Verbal Fluency Test    Assessment/Plan   Education and counseling provided:  Are appropriate based on today's review and evaluation    Diagnoses and all orders for this visit:    1. Medicare annual wellness visit, subsequent    2. Hypertension, unspecified type  -     METABOLIC PANEL, COMPREHENSIVE  -     URINALYSIS W/O MICRO    3. Cerebrovascular disease    4. Pure hypercholesterolemia  -     CK  -     LIPID PANEL    5. On statin therapy  -     CK  -     LIPID PANEL    6. DAKOTA (generalized anxiety disorder)    7. Primary insomnia  -     eszopiclone (LUNESTA) 2 mg tablet; Take 1 Tab by mouth nightly as needed for Sleep. Max Daily Amount: 2 mg.    8. Screening for depression  -     DEPRESSION SCREEN ANNUAL    9. Screening for diabetes mellitus    10. Screening for ischemic heart disease    Other orders  -     sertraline (ZOLOFT) 50 mg tablet; Take 1 Tab by mouth daily.         Health Maintenance Due     Health Maintenance Due   Topic Date Due    GLAUCOMA SCREENING Q2Y  12/03/2000    Flu Vaccine (1) 09/01/2020       Patient Care Team   Patient Care Team:  Karson Salter MD as PCP - General (Internal Medicine)  Karson Salter MD as PCP [facility] Empaneled Provider    History     Patient Active Problem List   Diagnosis Code    Insomnia G47.00    Wrist pain, acute, left M25.532    Allergic rhinitis J30.9    Acute gout of right foot M10.9    Arthralgia M25.50    Nasal polyp J33.9    Back pain M54.9    Cough R05    Chest pain R07.9    Arthritis M19.90    Hypertriglyceridemia E78.1    Fatigue R53.83    HTN (hypertension) I10    Rash R21    TIA (transient ischemic attack) G45.9    Bilateral carotid artery stenosis I65.23    Dysarthria due to recent cerebrovascular accident (CVA) L27.212    Cerebrovascular disease I67.9    Pure hypercholesterolemia E78.00    On statin therapy Z79.899     Past Medical History:   Diagnosis Date    Allergic rhinitis 11/1/2017    Impression: trial of otc Zyrtec    Arthralgia 11/1/2017    Impression: left hip, possibly referred from spine    Arthritis 11/1/2017    Back pain 11/1/2017    Chest pain 11/1/2017    Impression: left shoulder/arm pain ?anginal equivalent    Cough 11/1/2017    Impression: suspect medication related, will follow    Fatigue 11/1/2017    High cholesterol     HTN (hypertension) 11/1/2017    Impression: stable on HCTZ    Hypertension     intermittent    Hypertriglyceridemia 11/1/2017    Insomnia 11/1/2017    LBBB (left bundle branch block)     Nasal polyp 11/1/2017    On statin therapy 10/23/2018    Prostate cancer screening 11/1/2017    Rash 11/1/2017    Stroke ([facility] [address]     TIA    Wrist pain, acute, left 11/1/2017    Impression: mostly resolved, pt concerned this was heart related, more likely musculoskeletal or neuropathic, observe, if increases/persists follow up      Past Surgical History:   Procedure Laterality Date    COLONOSCOPY N/A 10/9/2018    COLONOSCOPY performed by Brad Dunn MD at [facility] ENDOSCOPY    HX CIRCUMCISION  2013    HX MOHS PROCEDURES Left     NEUROLOGICAL PROCEDURE UNLISTED      \"pinched nerve neck\"     Current Outpatient Medications   Medication Sig Dispense Refill    sertraline (ZOLOFT) 50 mg tablet Take 1 Tab by mouth daily. 30 Tab 5    eszopiclone (LUNESTA) 2 mg tablet Take 1 Tab by mouth nightly as needed for Sleep. Max Daily Amount: 2 mg. 30 Tab 0    atorvastatin (LIPITOR) 40 mg tablet TAKE ONE TABLET BY MOUTH DAILY 90 Tab 2    aspirin 81 mg chewable tablet Take 1 Tab by mouth daily. 30 Tab 6    hydrochlorothiazide (HYDRODIURIL) 25 mg tablet Take 25 mg by mouth daily. Indications: HYPERTENSION       Allergies   Allergen Reactions    Hydrocodone Other (comments)     jittery    Lisinopril Cough       Family History   Problem Relation Age of Onset    Dementia Mother     Heart Disease Father      Social History     Tobacco Use    Smoking status: Former Smoker     Packs/day: 1.00     Years: 3.00     Pack years: 3.00     Quit date:      Years since quittin.0    Smokeless tobacco: Never Used   Substance Use Topics    Alcohol use:  Yes     Alcohol/week: 7.0 standard drinks     Types: 7 Cans of beer per week

## 2021-01-05 NOTE — PATIENT INSTRUCTIONS
Medicare Wellness Visit, Male The best way to live healthy is to have a lifestyle where you eat a well-balanced diet, exercise regularly, limit alcohol use, and quit all forms of tobacco/nicotine, if applicable. Regular preventive services are another way to keep healthy. Preventive services (vaccines, screening tests, monitoring & exams) can help personalize your care plan, which helps you manage your own care. Screening tests can find health problems at the earliest stages, when they are easiest to treat. [facility] follows the current, evidence-based guidelines published by the [facility] ([facility]) when recommending preventive services for our patients. Because we follow these guidelines, sometimes recommendations change over time as research supports it. (For example, a prostate screening blood test is no longer routinely recommended for men with no symptoms). Of course, you and your doctor may decide to screen more often for some diseases, based on your risk and co-morbidities (chronic disease you are already diagnosed with). Preventive services for you include: - Medicare offers their members a free annual wellness visit, which is time for you and your primary care provider to discuss and plan for your preventive service needs. Take advantage of this benefit every year! 
-All adults over age 72 should receive the recommended pneumonia vaccines. Current USPSTF guidelines recommend a series of two vaccines for the best pneumonia protection.  
-All adults should have a flu vaccine yearly and tetanus vaccine every 10 years. 
-All adults age 48 and older should receive the shingles vaccines (series of two vaccines). -All adults age 38-68 who are overweight should have a diabetes screening test once every three years. -Other screening tests & preventive services for persons with diabetes include: an eye exam to screen for diabetic retinopathy, a kidney function test, a foot exam, and stricter control over your cholesterol.  
-Cardiovascular screening for adults with routine risk involves an electrocardiogram (ECG) at intervals determined by the provider.  
-Colorectal cancer screening should be done for adults age 54-65 with no increased risk factors for colorectal cancer. There are a number of acceptable methods of screening for this type of cancer. Each test has its own benefits and drawbacks. Discuss with your provider what is most appropriate for you during your annual wellness visit. The different tests include: colonoscopy (considered the best screening method), a fecal occult blood test, a fecal DNA test, and sigmoidoscopy. 
-All adults born between [facility] should be screened once for Hepatitis C. 
-An Abdominal Aortic Aneurysm (AAA) Screening is recommended for men age 73-68 who has ever smoked in their lifetime. Here is a list of your current Health Maintenance items (your personalized list of preventive services) with a due date: 
Health Maintenance Due Topic Date Due  Glaucoma Screening   12/03/2000  Yearly Flu Vaccine (1) 09/01/2020

## 2021-01-05 NOTE — PROGRESS NOTES
HIPAA verified by two patient identifiers. Mary oJn is a 80 y.o. male    Chief Complaint   Patient presents with    Hypertension    Annual Wellness Visit     6m       There were no vitals taken for this visit. Pain Scale: /10  Pain Location:       Health Maintenance Due   Topic Date Due    GLAUCOMA SCREENING Q2Y  12/03/2000    Flu Vaccine (1) 09/01/2020    Medicare Yearly Exam  11/19/2020         Coordination of Care Questionnaire:  :   1) Have you been to an emergency room, urgent care, or hospitalized since your last visit? If yes, where when, and reason for visit? no       2. Have seen or consulted any other health care provider since your last visit? If yes, where when, and reason for visit? NO      Patient is accompanied by self I have received verbal consent from Mary oJn to discuss any/all medical information while they are present in the room.

## 2021-01-07 LAB — BACTERIA UR CULT: NORMAL

## 2021-01-12 RX ORDER — ATORVASTATIN CALCIUM 40 MG/1
TABLET, FILM COATED ORAL
Qty: 90 TAB | Refills: 1 | Status: SHIPPED | OUTPATIENT
Start: 2021-01-12 | End: 2021-06-20

## 2021-01-19 DIAGNOSIS — F51.01 PRIMARY INSOMNIA: ICD-10-CM

## 2021-01-19 NOTE — TELEPHONE ENCOUNTER
Last Refill: 1/5/21  Last Visit: 1/5/2021   Next Visit: 7/13/2021    Requested Prescriptions     Pending Prescriptions Disp Refills    eszopiclone (LUNESTA) 2 mg tablet 30 Tab 0     Sig: Take 1 Tab by mouth nightly as needed for Sleep. Max Daily Amount: 2 mg.

## 2021-01-20 RX ORDER — ESZOPICLONE 2 MG/1
2 TABLET, FILM COATED ORAL
Qty: 30 TAB | Refills: 0 | Status: SHIPPED | OUTPATIENT
Start: 2021-01-20 | End: 2021-02-15 | Stop reason: SDUPTHER

## 2021-02-15 DIAGNOSIS — F51.01 PRIMARY INSOMNIA: ICD-10-CM

## 2021-02-15 RX ORDER — ESZOPICLONE 2 MG/1
2 TABLET, FILM COATED ORAL
Qty: 30 TAB | Refills: 0 | Status: SHIPPED | OUTPATIENT
Start: 2021-02-15 | End: 2021-07-13

## 2021-02-15 NOTE — TELEPHONE ENCOUNTER
Last Refill: 1/20/21  Last Visit: 1/5/2021   Next Visit: 7/13/2021    Requested Prescriptions     Pending Prescriptions Disp Refills    eszopiclone (LUNESTA) 2 mg tablet 30 Tab 0     Sig: Take 1 Tab by mouth nightly as needed for Sleep. Max Daily Amount: 2 mg.

## 2021-03-01 ENCOUNTER — IMMUNIZATION (OUTPATIENT)
Dept: INTERNAL MEDICINE CLINIC | Age: 86
End: 2021-03-01
Payer: MEDICARE

## 2021-03-01 DIAGNOSIS — Z23 ENCOUNTER FOR IMMUNIZATION: Primary | ICD-10-CM

## 2021-03-01 PROCEDURE — 91300 COVID-19, MRNA, LNP-S, PF, 30MCG/0.3ML DOSE(PFIZER): CPT | Performed by: FAMILY MEDICINE

## 2021-03-01 PROCEDURE — 0001A COVID-19, MRNA, LNP-S, PF, 30MCG/0.3ML DOSE(PFIZER): CPT | Performed by: FAMILY MEDICINE

## 2021-03-10 ENCOUNTER — TELEPHONE (OUTPATIENT)
Dept: INTERNAL MEDICINE CLINIC | Age: 86
End: 2021-03-10

## 2021-03-10 NOTE — TELEPHONE ENCOUNTER
Patient calling to let Dr. Sasha Colbert know that his insurance will not cover Lunesta but will cover:    Zaleplon   Diazepam  Clonazepam     He needs a refill. [facility]    Please let patient know.

## 2021-03-12 DIAGNOSIS — F51.01 PRIMARY INSOMNIA: Primary | ICD-10-CM

## 2021-03-12 RX ORDER — CLONAZEPAM 1 MG/1
1 TABLET ORAL
Qty: 30 TAB | Refills: 2 | Status: SHIPPED | OUTPATIENT
Start: 2021-03-12 | End: 2021-06-08 | Stop reason: SDUPTHER

## 2021-03-22 ENCOUNTER — IMMUNIZATION (OUTPATIENT)
Dept: INTERNAL MEDICINE CLINIC | Age: 86
End: 2021-03-22
Payer: MEDICARE

## 2021-03-22 DIAGNOSIS — Z23 ENCOUNTER FOR IMMUNIZATION: Primary | ICD-10-CM

## 2021-03-22 PROCEDURE — 0002A COVID-19, MRNA, LNP-S, PF, 30MCG/0.3ML DOSE(PFIZER): CPT | Performed by: FAMILY MEDICINE

## 2021-03-22 PROCEDURE — 91300 COVID-19, MRNA, LNP-S, PF, 30MCG/0.3ML DOSE(PFIZER): CPT | Performed by: FAMILY MEDICINE

## 2021-06-08 DIAGNOSIS — F51.01 PRIMARY INSOMNIA: ICD-10-CM

## 2021-06-08 RX ORDER — CLONAZEPAM 1 MG/1
1 TABLET ORAL
Qty: 30 TABLET | Refills: 2 | Status: SHIPPED | OUTPATIENT
Start: 2021-06-08 | End: 2021-07-13 | Stop reason: SDUPTHER

## 2021-06-08 NOTE — TELEPHONE ENCOUNTER
PCP: Trever Estrada MD    Last appt: 1/5/2021  Future Appointments   Date Time Provider Orin Miguel   7/13/2021 10:15 AM Trever Estrada MD [facility]       Requested Prescriptions     Pending Prescriptions Disp Refills    clonazePAM (KlonoPIN) 1 mg tablet 30 Tablet 2     Sig: Take 1 Tablet by mouth nightly as needed for Anxiety. Max Daily Amount: 1 mg.        Prior labs and Blood pressures:  BP Readings from Last 3 Encounters:   01/05/21 134/70   06/22/20 120/64   11/19/19 116/74     Lab Results   Component Value Date/Time    Sodium 144 01/05/2021 12:06 PM    Potassium 4.4 01/05/2021 12:06 PM    Chloride 103 01/05/2021 12:06 PM    CO2 29.0 01/05/2021 12:06 PM    Anion gap 12 01/05/2021 12:06 PM    Glucose 105 01/05/2021 12:06 PM    BUN 14.0 01/05/2021 12:06 PM    Creatinine 0.8 01/05/2021 12:06 PM    BUN/Creatinine ratio 18 01/05/2021 12:06 PM    GFR est AA >60 01/05/2021 12:06 PM    GFR est non-AA >60 01/05/2021 12:06 PM    Calcium 10.0 01/05/2021 12:06 PM     Lab Results   Component Value Date/Time    Hemoglobin A1c 5.6 11/28/2017 05:12 AM     Lab Results   Component Value Date/Time    Cholesterol, total 118 01/05/2021 12:06 PM    Cholesterol (POC) 104.0 04/10/2018 11:13 AM    HDL Cholesterol 51 01/05/2021 12:06 PM    HDL Cholesterol (POC) 41.0 04/10/2018 11:13 AM    LDL Cholesterol (POC) 34.0 04/10/2018 11:13 AM    LDL, calculated 45 01/05/2021 12:06 PM    VLDL, calculated 23 10/30/2018 09:26 AM    VLDL 22 01/05/2021 12:06 PM    Triglyceride 108 01/05/2021 12:06 PM    Triglycerides (POC) 145.0 04/10/2018 11:13 AM    CHOL/HDL Ratio 2 01/05/2021 12:06 PM     No results found for: ANY Ayala    Lab Results   Component Value Date/Time    TSH 1.66 11/28/2017 05:12 AM

## 2021-06-20 RX ORDER — ATORVASTATIN CALCIUM 40 MG/1
TABLET, FILM COATED ORAL
Qty: 90 TABLET | Refills: 0 | Status: SHIPPED | OUTPATIENT
Start: 2021-06-20 | End: 2021-10-18

## 2021-06-20 RX ORDER — SERTRALINE HYDROCHLORIDE 50 MG/1
TABLET, FILM COATED ORAL
Qty: 30 TABLET | Refills: 4 | Status: SHIPPED | OUTPATIENT
Start: 2021-06-20 | End: 2021-11-04

## 2021-06-22 DIAGNOSIS — F51.01 PRIMARY INSOMNIA: ICD-10-CM

## 2021-07-13 ENCOUNTER — OFFICE VISIT (OUTPATIENT)
Dept: INTERNAL MEDICINE CLINIC | Age: 86
End: 2021-07-13
Payer: MEDICARE

## 2021-07-13 VITALS
SYSTOLIC BLOOD PRESSURE: 132 MMHG | RESPIRATION RATE: 18 BRPM | HEART RATE: 62 BPM | OXYGEN SATURATION: 96 % | BODY MASS INDEX: 28.88 KG/M2 | WEIGHT: 184 LBS | DIASTOLIC BLOOD PRESSURE: 71 MMHG | HEIGHT: 67 IN

## 2021-07-13 DIAGNOSIS — I10 HYPERTENSION, UNSPECIFIED TYPE: Primary | ICD-10-CM

## 2021-07-13 DIAGNOSIS — F51.01 PRIMARY INSOMNIA: ICD-10-CM

## 2021-07-13 DIAGNOSIS — I67.9 CEREBROVASCULAR DISEASE: ICD-10-CM

## 2021-07-13 DIAGNOSIS — Z79.899 ON STATIN THERAPY: ICD-10-CM

## 2021-07-13 DIAGNOSIS — E78.00 PURE HYPERCHOLESTEROLEMIA: ICD-10-CM

## 2021-07-13 LAB
ALBUMIN SERPL-MCNC: 3.9 G/DL (ref 3.5–5)
ALBUMIN/GLOB SERPL: 1.3 {RATIO} (ref 1.1–2.2)
ALP SERPL-CCNC: 97 U/L (ref 45–117)
ALT SERPL-CCNC: 19 U/L (ref 12–78)
ANION GAP SERPL CALC-SCNC: 6 MMOL/L (ref 5–15)
APPEARANCE UR: CLEAR
AST SERPL-CCNC: 15 U/L (ref 15–37)
BACTERIA URNS QL MICRO: NEGATIVE /HPF
BILIRUB SERPL-MCNC: 0.8 MG/DL (ref 0.2–1)
BILIRUB UR QL: NEGATIVE
BUN SERPL-MCNC: 11 MG/DL (ref 6–20)
BUN/CREAT SERPL: 13 (ref 12–20)
CALCIUM SERPL-MCNC: 9.5 MG/DL (ref 8.5–10.1)
CHLORIDE SERPL-SCNC: 106 MMOL/L (ref 97–108)
CHOLEST SERPL-MCNC: 124 MG/DL
CK SERPL-CCNC: 74 U/L (ref 39–308)
CO2 SERPL-SCNC: 27 MMOL/L (ref 21–32)
COLOR UR: NORMAL
CREAT SERPL-MCNC: 0.84 MG/DL (ref 0.7–1.3)
EPITH CASTS URNS QL MICRO: NORMAL /LPF
GLOBULIN SER CALC-MCNC: 3 G/DL (ref 2–4)
GLUCOSE SERPL-MCNC: 102 MG/DL (ref 65–100)
GLUCOSE UR STRIP.AUTO-MCNC: NEGATIVE MG/DL
HDLC SERPL-MCNC: 52 MG/DL
HDLC SERPL: 2.4 {RATIO} (ref 0–5)
HGB UR QL STRIP: NEGATIVE
HYALINE CASTS URNS QL MICRO: NORMAL /LPF (ref 0–5)
KETONES UR QL STRIP.AUTO: NEGATIVE MG/DL
LDLC SERPL CALC-MCNC: 53.4 MG/DL (ref 0–100)
LEUKOCYTE ESTERASE UR QL STRIP.AUTO: NEGATIVE
NITRITE UR QL STRIP.AUTO: NEGATIVE
PH UR STRIP: 5.5 [PH] (ref 5–8)
POTASSIUM SERPL-SCNC: 3.9 MMOL/L (ref 3.5–5.1)
PROT SERPL-MCNC: 6.9 G/DL (ref 6.4–8.2)
PROT UR STRIP-MCNC: NEGATIVE MG/DL
RBC #/AREA URNS HPF: NORMAL /HPF (ref 0–5)
SODIUM SERPL-SCNC: 139 MMOL/L (ref 136–145)
SP GR UR REFRACTOMETRY: 1.02 (ref 1–1.03)
TRIGL SERPL-MCNC: 93 MG/DL (ref ?–150)
UA: UC IF INDICATED,UAUC: NORMAL
UROBILINOGEN UR QL STRIP.AUTO: 1 EU/DL (ref 0.2–1)
VLDLC SERPL CALC-MCNC: 18.6 MG/DL
WBC URNS QL MICRO: NORMAL /HPF (ref 0–4)

## 2021-07-13 PROCEDURE — G8427 DOCREV CUR MEDS BY ELIG CLIN: HCPCS | Performed by: INTERNAL MEDICINE

## 2021-07-13 PROCEDURE — G8536 NO DOC ELDER MAL SCRN: HCPCS | Performed by: INTERNAL MEDICINE

## 2021-07-13 PROCEDURE — 99214 OFFICE O/P EST MOD 30 MIN: CPT | Performed by: INTERNAL MEDICINE

## 2021-07-13 PROCEDURE — G8754 DIAS BP LESS 90: HCPCS | Performed by: INTERNAL MEDICINE

## 2021-07-13 PROCEDURE — G8510 SCR DEP NEG, NO PLAN REQD: HCPCS | Performed by: INTERNAL MEDICINE

## 2021-07-13 PROCEDURE — 1101F PT FALLS ASSESS-DOCD LE1/YR: CPT | Performed by: INTERNAL MEDICINE

## 2021-07-13 PROCEDURE — G8419 CALC BMI OUT NRM PARAM NOF/U: HCPCS | Performed by: INTERNAL MEDICINE

## 2021-07-13 PROCEDURE — G8752 SYS BP LESS 140: HCPCS | Performed by: INTERNAL MEDICINE

## 2021-07-13 RX ORDER — CLONAZEPAM 1 MG/1
1 TABLET ORAL
Qty: 30 TABLET | Refills: 2 | Status: SHIPPED | OUTPATIENT
Start: 2021-07-13 | End: 2021-11-10 | Stop reason: SDUPTHER

## 2021-07-13 NOTE — PROGRESS NOTES
Ankita Angelo is a 80 y.o. male and presents with Follow-up (pt states he has noticed some redness on his arm )  . Subjective:  Mr. Matilde Morales presents today for follow-up of hyperlipidemia, monitoring statin therapy, hypertension, and depression and anxiety. He remains on low-dose sertraline 50 mg daily. He takes clonazepam 1 mg nightly as needed. He remains as the sole caregiver for his wife who is basically disabled and completely dependent on him for ADLs. He does have some help coming in in the mornings. He denies any shortness of breath, chest pain, palpitations, PND, orthopnea, or pedal edema.     Past Medical History:   Diagnosis Date    Allergic rhinitis 11/1/2017    Impression: trial of otc Zyrtec    Arthralgia 11/1/2017    Impression: left hip, possibly referred from spine    Arthritis 11/1/2017    Back pain 11/1/2017    Chest pain 11/1/2017    Impression: left shoulder/arm pain ?anginal equivalent    Cough 11/1/2017    Impression: suspect medication related, will follow    Fatigue 11/1/2017    High cholesterol     HTN (hypertension) 11/1/2017    Impression: stable on HCTZ    Hypertension     intermittent    Hypertriglyceridemia 11/1/2017    Insomnia 11/1/2017    LBBB (left bundle branch block)     Nasal polyp 11/1/2017    On statin therapy 10/23/2018    Prostate cancer screening 11/1/2017    Rash 11/1/2017    Stroke ([facility] [address]     TIA    Wrist pain, acute, left 11/1/2017    Impression: mostly resolved, pt concerned this was heart related, more likely musculoskeletal or neuropathic, observe, if increases/persists follow up     Past Surgical History:   Procedure Laterality Date    COLONOSCOPY N/A 10/9/2018    COLON[facility]COPY performed by [facility]ovanna Sullivan [facility] at [facility]COPY    HX CIRCUMCISION  2013    HX MOHS PROCEDURES Left     NEUROLOGICAL PROCEDURE UNLISTED      \"pinched nerve neck\"     Allergies   Allergen Reactions    Hydrocodone Other (comments)     jittery    Lisinopril Cough     Current Outpatient Medications   Medication Sig Dispense Refill    clonazePAM (KlonoPIN) 1 mg tablet Take 1 Tablet by mouth nightly as needed for Anxiety. Max Daily Amount: 1 mg. 30 Tablet 2    sertraline (ZOLOFT) 50 mg tablet TAKE ONE TABLET BY MOUTH DAILY 30 Tablet 4    atorvastatin (LIPITOR) 40 mg tablet TAKE ONE TABLET BY MOUTH DAILY 90 Tablet 0     Social History     Socioeconomic History    Marital status:      Spouse name: Not on file    Number of children: Not on file    Years of education: Not on file    Highest education level: Not on file   Tobacco Use    Smoking status: Former Smoker     Packs/day: 1.00     Years: 3.00     Pack years: 3.00     Quit date:      Years since quittin.5    Smokeless tobacco: Never Used   Vaping Use    Vaping Use: Never used   Substance and Sexual Activity    Alcohol use: Yes     Alcohol/week: 7.0 standard drinks     Types: 7 Cans of beer per week    Drug use: No     Social Determinants of Health     Financial Resource Strain:     Difficulty of Paying Living Expenses:    Food Insecurity:     Worried About Running Out of Food in the Last Year:     [address] in the Last Year:    Transportation Needs:     Lack of Transportation (Medical):      Lack of Transportation (Non-Medical):    Physical Activity:     Days of Exercise per Week:     Minutes of Exercise per Session:    Stress:     Feeling of Stress :    Social Connections:     Frequency of Communication with Friends and Family:     Frequency of Social Gatherings with Friends and Family:     Attends Muslim Services:     Active Member of Clubs or Organizations:     Attends Club or Organization Meetings:     Marital Status:      Family History   Problem Relation Age of Onset    Dementia Mother     Heart Disease Father        Review of Systems  Constitutional:  negative for fevers, chills, anorexia and weight loss  Eyes:    negative for visual disturbance and irritation  ENT:    negative for tinnitus,sore throat,nasal congestion,ear pains. hoarseness  Respiratory:     negative for cough, hemoptysis, dyspnea,wheezing  CV:    negative for chest pain, palpitations, lower extremity edema  GI:    negative for nausea, vomiting, diarrhea, abdominal pain,melena  Endo:               negative for polyuria,polydipsia,polyphagia,heat intolerance  Genitourinary : negative for frequency, dysuria and hematuria  Integumentary: negative for rash and pruritus  Hematologic:   negative for easy bruising and gum/nose bleeding  Musculoskel:  negative for myalgias, arthralgias, back pain, muscle weakness, joint pain  Neurological:   negative for headaches, dizziness, vertigo, memory problems and gait   Behavl/Psych:  negative for feelings of anxiety, depression, mood changes  ROS otherwise negative      Objective:  Visit Vitals  /71 (BP 1 Location: Left arm, BP Patient Position: Sitting, BP Cuff Size: Large adult)   Pulse 62   Resp 18   Ht 5' 7\" (1.702 m)   Wt 184 lb (83.5 kg)   SpO2 96%   BMI 28.82 kg/m²     Physical Exam:   General appearance - alert, well appearing, and in no distress  Mental status - alert, oriented to person, place, and time  EYE-KESHIA, EOMI, fundi normal, corneas normal, no foreign bodies  ENT-ENT exam normal, no neck nodes or sinus tenderness  Nose - normal and patent, no erythema, discharge or polyps  Mouth - mucous membranes moist, pharynx normal without lesions  Neck - supple, no significant adenopathy   Chest - clear to auscultation, no wheezes, rales or rhonchi, symmetric air entry   Heart - normal rate, regular rhythm, normal S1, S2, no murmurs, rubs, clicks or gallops   Abdomen - soft, nontender, nondistended, no masses or organomegaly  Lymph- no adenopathy palpable  Ext-peripheral pulses normal, no pedal edema, no clubbing or cyanosis  Skin-Warm and dry.  no hyperpigmentation, vitiligo, or suspicious lesions  Neuro -alert, oriented, normal speech, no focal findings or movement disorder noted      Assessment/Plan:  Diagnoses and all orders for this visit:    1. Hypertension, unspecified type  -     METABOLIC PANEL, COMPREHENSIVE; Future  -     URINALYSIS W/ REFLEX CULTURE; Future    2. Cerebrovascular disease    3. Pure hypercholesterolemia  -     CK; Future  -     LIPID PANEL; Future    4. On statin therapy  -     CK; Future  -     LIPID PANEL; Future    5. Primary insomnia  -     clonazePAM (KlonoPIN) 1 mg tablet; Take 1 Tablet by mouth nightly as needed for Anxiety. Max Daily Amount: 1 mg. ICD-10-CM ICD-9-CM    1. Hypertension, unspecified type  W76 291.1 METABOLIC PANEL, COMPREHENSIVE      URINALYSIS W/ REFLEX CULTURE   2. Cerebrovascular disease  I67.9 437.9    3. Pure hypercholesterolemia  E78.00 272.0 CK      LIPID PANEL   4. On statin therapy  Z79.899 V58.69 CK      LIPID PANEL   5. Primary insomnia  F51.01 307.42 clonazePAM (KlonoPIN) 1 mg tablet     Plan:    Continue current medical regimen as outlined above. Further recommendations based on labs as ordered. If he feels that he is becoming more depressed I would consider increasing his sertraline from 50 to 100 mg daily as discussed. Patient is no longer on hydrochlorothiazide for hypertension which appears to be well controlled without medication. Follow-up and Dispositions    · Return in about 6 months (around 1/13/2022) for [address] [place] [address] I have reviewed with the patient details of the assessment and plan and all questions were answered. Relevent patient education was performed. Verbal and/or written instructions (see AVS) provided. The most recent lab findings were reviewed with the patient. Plan was discussed with patient who verbally expressed understanding. An After Visit Summary was printed and given to the patient.     Tiffany Nielsen MD

## 2021-07-13 NOTE — PROGRESS NOTES
1. Have you been to the ER, urgent care clinic since your last visit? Hospitalized since your last visit? No    2. Have you seen or consulted any other health care providers outside of the [address] [place] [address] since your last visit? Include any pap smears or colon screening.  No

## 2021-11-04 RX ORDER — SERTRALINE HYDROCHLORIDE 50 MG/1
TABLET, FILM COATED ORAL
Qty: 30 TABLET | Refills: 4 | Status: SHIPPED | OUTPATIENT
Start: 2021-11-04

## 2021-11-10 DIAGNOSIS — F51.01 PRIMARY INSOMNIA: ICD-10-CM

## 2021-11-10 RX ORDER — CLONAZEPAM 1 MG/1
1 TABLET ORAL
Qty: 30 TABLET | Refills: 2 | Status: SHIPPED | OUTPATIENT
Start: 2021-11-10 | End: 2022-02-11

## 2021-11-10 NOTE — TELEPHONE ENCOUNTER
Last Refill: 7/13/21  Last Visit: 7/13/2021   Next Visit: 1/14/2022    Requested Prescriptions     Pending Prescriptions Disp Refills    clonazePAM (KlonoPIN) 1 mg tablet 30 Tablet 2     Sig: Take 1 Tablet by mouth nightly as needed for Anxiety. Max Daily Amount: 1 mg.

## 2022-01-14 ENCOUNTER — OFFICE VISIT (OUTPATIENT)
Dept: INTERNAL MEDICINE CLINIC | Age: 87
End: 2022-01-14
Payer: MEDICARE

## 2022-01-14 VITALS
HEART RATE: 73 BPM | OXYGEN SATURATION: 96 % | WEIGHT: 180 LBS | RESPIRATION RATE: 18 BRPM | DIASTOLIC BLOOD PRESSURE: 73 MMHG | SYSTOLIC BLOOD PRESSURE: 133 MMHG | BODY MASS INDEX: 28.25 KG/M2 | HEIGHT: 67 IN

## 2022-01-14 DIAGNOSIS — I67.9 CEREBROVASCULAR DISEASE: ICD-10-CM

## 2022-01-14 DIAGNOSIS — I10 HYPERTENSION, UNSPECIFIED TYPE: ICD-10-CM

## 2022-01-14 DIAGNOSIS — Z79.899 ON STATIN THERAPY: ICD-10-CM

## 2022-01-14 DIAGNOSIS — Z13.31 SCREENING FOR DEPRESSION: ICD-10-CM

## 2022-01-14 DIAGNOSIS — E78.00 PURE HYPERCHOLESTEROLEMIA: ICD-10-CM

## 2022-01-14 DIAGNOSIS — Z00.00 MEDICARE ANNUAL WELLNESS VISIT, SUBSEQUENT: Primary | ICD-10-CM

## 2022-01-14 DIAGNOSIS — Z13.6 SCREENING FOR ISCHEMIC HEART DISEASE: ICD-10-CM

## 2022-01-14 PROCEDURE — 1101F PT FALLS ASSESS-DOCD LE1/YR: CPT | Performed by: INTERNAL MEDICINE

## 2022-01-14 PROCEDURE — G8536 NO DOC ELDER MAL SCRN: HCPCS | Performed by: INTERNAL MEDICINE

## 2022-01-14 PROCEDURE — G8510 SCR DEP NEG, NO PLAN REQD: HCPCS | Performed by: INTERNAL MEDICINE

## 2022-01-14 PROCEDURE — G8419 CALC BMI OUT NRM PARAM NOF/U: HCPCS | Performed by: INTERNAL MEDICINE

## 2022-01-14 PROCEDURE — G8427 DOCREV CUR MEDS BY ELIG CLIN: HCPCS | Performed by: INTERNAL MEDICINE

## 2022-01-14 PROCEDURE — G0439 PPPS, SUBSEQ VISIT: HCPCS | Performed by: INTERNAL MEDICINE

## 2022-01-14 NOTE — PROGRESS NOTES
This is the Subsequent Medicare Annual Wellness Exam, performed 12 months or more after the Initial AWV or the last Subsequent AWV    I have reviewed the patient's medical history in detail and updated the computerized patient record. Assessment/Plan   Education and counseling provided:  Are appropriate based on today's review and evaluation    1. Medicare annual wellness visit, subsequent  2. Cerebrovascular disease  3. Hypertension, unspecified type  -     CK; Future  -     LIPID PANEL; Future  -     METABOLIC PANEL, COMPREHENSIVE; Future  4. On statin therapy  -     CK; Future  -     LIPID PANEL; Future  -     METABOLIC PANEL, COMPREHENSIVE; Future  5. Pure hypercholesterolemia  -     CK; Future  -     LIPID PANEL; Future  -     METABOLIC PANEL, COMPREHENSIVE; Future  6. Screening for ischemic heart disease  7. Screening for depression  -     DEPRESSION SCREEN ANNUAL       Depression Risk Factor Screening     3 most recent PHQ Screens 1/14/2022   Little interest or pleasure in doing things Not at all   Feeling down, depressed, irritable, or hopeless Not at all   Total Score PHQ 2 0       Alcohol & Drug Abuse Risk Screen    Do you average more than 1 drink per night or more than 7 drinks a week: No    In the past three months have you have had more than 4 drinks containing alcohol on one occasion: No          Functional Ability and Level of Safety    Hearing: Hearing is good. Activities of Daily Living: The home contains: no safety equipment. Patient does total self care      Ambulation: with no difficulty     Fall Risk:  Fall Risk Assessment, last 12 mths 7/13/2021   Able to walk? Yes   Fall in past 12 months? 0   Do you feel unsteady?  0   Are you worried about falling 0      Abuse Screen:  Patient is not abused       Cognitive Screening    Has your family/caregiver stated any concerns about your memory: no     Cognitive Screening: Normal - Verbal Fluency Test    Health Maintenance Due     Health Maintenance Due   Topic Date Due    Flu Vaccine (1) 09/01/2021    COVID-19 Vaccine (3 - Booster for Pfizer series) 09/22/2021       Patient Care Team   Patient Care Team:  Juliana Valdez MD as PCP - General (Internal Medicine)  Juliana Valdez MD as PCP - [facility] Suzanne Alejo Provider    History     Patient Active Problem List   Diagnosis Code    Insomnia G47.00    Wrist pain, acute, left M25.532    Allergic rhinitis J30.9    Acute gout of right foot M10.9    Arthralgia M25.50    Nasal polyp J33.9    Back pain M54.9    Cough R05.9    Chest pain R07.9    Arthritis M19.90    Hypertriglyceridemia E78.1    Fatigue R53.83    HTN (hypertension) I10    Rash R21    TIA (transient ischemic attack) G45.9    Bilateral carotid artery stenosis I65.23    Dysarthria due to recent cerebrovascular accident (CVA) G66.674    Cerebrovascular disease I67.9    Pure hypercholesterolemia E78.00    On statin therapy Z79.899     Past Medical History:   Diagnosis Date    Allergic rhinitis 11/1/2017    Impression: trial of otc Zyrtec    Arthralgia 11/1/2017    Impression: left hip, possibly referred from spine    Arthritis 11/1/2017    Back pain 11/1/2017    Chest pain 11/1/2017    Impression: left shoulder/arm pain ?anginal equivalent    Cough 11/1/2017    Impression: suspect medication related, will follow    Fatigue 11/1/2017    High cholesterol     HTN (hypertension) 11/1/2017    Impression: stable on HCTZ    Hypertension     intermittent    Hypertriglyceridemia 11/1/2017    Insomnia 11/1/2017    LBBB (left bundle branch block)     Nasal polyp 11/1/2017    On statin therapy 10/23/2018    Prostate cancer screening 11/1/2017    Rash 11/1/2017    Stroke ([address]     TIA    Wrist pain, acute, left 11/1/2017    Impression: mostly resolved, pt concerned this was heart related, more likely musculoskeletal or neuropathic, observe, if increases/persists follow up      Past Surgical History:   Procedure Laterality Date    COLONOSCOPY N/A 10/9/2018    COLONOSCOPY performed by Jessy Mccurdy MD at [facility] ENDOSCOPY    HX CIRCUMCISION      HX MOHS PROCEDURES Left     NEUROLOGICAL PROCEDURE UNLISTED      \"pinched nerve neck\"     Current Outpatient Medications   Medication Sig Dispense Refill    clonazePAM (KlonoPIN) 1 mg tablet Take 1 Tablet by mouth nightly as needed for Anxiety. Max Daily Amount: 1 mg. 30 Tablet 2    sertraline (ZOLOFT) 50 mg tablet TAKE ONE TABLET BY MOUTH DAILY 30 Tablet 4    atorvastatin (LIPITOR) 40 mg tablet TAKE ONE TABLET BY MOUTH DAILY 90 Tablet 1     Allergies   Allergen Reactions    Hydrocodone Other (comments)     jittery    Lisinopril Cough       Family History   Problem Relation Age of Onset    Dementia Mother     Heart Disease Father      Social History     Tobacco Use    Smoking status: Former Smoker     Packs/day: 1.00     Years: 3.00     Pack years: 3.00     Quit date:      Years since quittin.0    Smokeless tobacco: Never Used   Substance Use Topics    Alcohol use: Yes     Alcohol/week: 7.0 standard drinks     Types: 7 Cans of beer per week         Nirav Chamberlain MD           Shekhar Barker is a 80 y.o. male and presents with Follow-up (6 month f/u)  . Subjective:  Mr. Anna Randhawa presents today for Medicare annual notes review as well as follow-up of cerebrovascular disease status post previous CVA without residual weakness, hyperlipidemia, and monitoring statin therapy. He continues to take care of his wife who is generally healthy other than loss of lower extremity strength and inability to walk. She is completely dependent on him for ADLs. He notes this has been tough. He has lost little bit of weight. His appetite is good and he is eating well. He has no shortness of breath, chest pain, palpitations, PND, orthopnea, or pedal edema.     Past Medical History:   Diagnosis Date    Allergic rhinitis 2017    Impression: trial of otc Zyrtec    Arthralgia 2017    Impression: left hip, possibly referred from spine    Arthritis 2017    Back pain 2017    Chest pain 2017    Impression: left shoulder/arm pain ?anginal equivalent    Cough 2017    Impression: suspect medication related, will follow    Fatigue 2017    High cholesterol     HTN (hypertension) 2017    Impression: stable on HCTZ    Hypertension     intermittent    Hypertriglyceridemia 2017    Insomnia 2017    LBBB (left bundle branch block)     Nasal polyp 2017    On statin therapy 10/23/2018    Prostate cancer screening 2017    Rash 2017    Stroke ([facility] [address]     TIA    Wrist pain, acute, left 2017    Impression: mostly resolved, pt concerned this was heart related, more likely musculoskeletal or neuropathic, observe, if increases/persists follow up     Past Surgical History:   Procedure Laterality Date    COLONOSCOPY N/A 10/9/2018    COLON[facility] performed by [facility]arnestine Kilg[facility] at [facility] ENDOSCOPY    HX CIRCUMCISION      HX MOHS PROCEDURES Left     NEUROLOGICAL PROCEDURE UNLISTED      \"pinched nerve neck\"     Allergies   Allergen Reactions    Hydrocodone Other (comments)     jittery    Lisinopril Cough     Current Outpatient Medications   Medication Sig Dispense Refill    clonazePAM (KlonoPIN) 1 mg tablet Take 1 Tablet by mouth nightly as needed for Anxiety. Max Daily Amount: 1 mg. 30 Tablet 2    sertraline (ZOLOFT) 50 mg tablet TAKE ONE TABLET BY MOUTH DAILY 30 Tablet 4    atorvastatin (LIPITOR) 40 mg tablet TAKE ONE TABLET BY MOUTH DAILY 90 Tablet 1     Social History     Socioeconomic History    Marital status:    Tobacco Use    Smoking status: Former Smoker     Packs/day: 1.00     Years: 3.00     Pack years: 3.00     Quit date: 1960     Years since quittin.0    Smokeless tobacco: Never Used   Vaping Use    Vaping Use: Never used   Substance and Sexual Activity    Alcohol use:  Yes Alcohol/week: 7.0 standard drinks     Types: 7 Cans of beer per week    Drug use: No     Family History   Problem Relation Age of Onset    Dementia Mother     Heart Disease Father        Review of Systems  Constitutional:  negative for fevers, chills, anorexia and weight loss  Eyes:    negative for visual disturbance and irritation  ENT:    negative for tinnitus,sore throat,nasal congestion,ear pains. hoarseness  Respiratory:     negative for cough, hemoptysis, dyspnea,wheezing  CV:    negative for chest pain, palpitations, lower extremity edema  GI:    negative for nausea, vomiting, diarrhea, abdominal pain,melena  Endo:               negative for polyuria,polydipsia,polyphagia,heat intolerance  Genitourinary : negative for frequency, dysuria and hematuria  Integumentary: negative for rash and pruritus  Hematologic:   negative for easy bruising and gum/nose bleeding  Musculoskel:  negative for myalgias, arthralgias, back pain, muscle weakness, joint pain  Neurological:   negative for headaches, dizziness, vertigo, memory problems and gait   Behavl/Psych:  negative for feelings of anxiety, depression, mood changes  ROS otherwise negative      Objective:  Visit Vitals  /73 (BP 1 Location: Left arm, BP Patient Position: Sitting, BP Cuff Size: Large adult)   Pulse 73   Resp 18   Ht 5' 7\" (1.702 m)   Wt 180 lb (81.6 kg)   SpO2 96%   BMI 28.19 kg/m²     Physical Exam:   General appearance - alert, well appearing, and in no distress  Mental status - alert, oriented to person, place, and time  EYE-KESHIA, EOMI, fundi normal, corneas normal, no foreign bodies  ENT-ENT exam normal, no neck nodes or sinus tenderness  Nose - normal and patent, no erythema, discharge or polyps  Mouth - mucous membranes moist, pharynx normal without lesions  Neck - supple, no significant adenopathy   Chest - clear to auscultation, no wheezes, rales or rhonchi, symmetric air entry   Heart - normal rate, regular rhythm, normal S1, S2, no murmurs, rubs, clicks or gallops   Abdomen - soft, nontender, nondistended, no masses or organomegaly  Lymph- no adenopathy palpable  Ext-peripheral pulses normal, no pedal edema, no clubbing or cyanosis  Skin-Warm and dry. no hyperpigmentation, vitiligo, or suspicious lesions  Neuro -alert, oriented, normal speech, no focal findings or movement disorder noted      Assessment/Plan:  Diagnoses and all orders for this visit:    1. Medicare annual wellness visit, subsequent    2. Cerebrovascular disease    3. Hypertension, unspecified type  -     CK; Future  -     LIPID PANEL; Future  -     METABOLIC PANEL, COMPREHENSIVE; Future    4. On statin therapy  -     CK; Future  -     LIPID PANEL; Future  -     METABOLIC PANEL, COMPREHENSIVE; Future    5. Pure hypercholesterolemia  -     CK; Future  -     LIPID PANEL; Future  -     METABOLIC PANEL, COMPREHENSIVE; Future    6. Screening for ischemic heart disease    7. Screening for depression  -     [address]ious 68          ICD-10-CM ICD-9-CM    1. Medicare annual wellness visit, subsequent  Z00.00 V70.0    2. Cerebrovascular disease  I67.9 437.9    3. Hypertension, unspecified type  I10 401.9 CK      LIPID PANEL      METABOLIC PANEL, COMPREHENSIVE   4. On statin therapy  Z79.899 V58.69 CK      LIPID PANEL      METABOLIC PANEL, COMPREHENSIVE   5. Pure hypercholesterolemia  E78.00 272.0 CK      LIPID PANEL      METABOLIC PANEL, COMPREHENSIVE   6. Screening for ischemic heart disease  Z13.6 V81.0    7. Screening for depression  Z13.31 V79.0 DEPRESSION SCREEN ANNUAL       Plan:    Continue current medical regimen as outlined above. Follow-up labs as ordered. I have reviewed with the patient details of the assessment and plan and all questions were answered. Relevent patient education was performed. Verbal and/or written instructions (see AVS) provided. The most recent lab findings were reviewed with the patient.   Plan was discussed with patient who verbally expressed understanding. An After Visit Summary was printed and given to the patient.     Janet Morin MD

## 2022-01-14 NOTE — PATIENT INSTRUCTIONS
Medicare Wellness Visit, Male    The best way to live healthy is to have a lifestyle where you eat a well-balanced diet, exercise regularly, limit alcohol use, and quit all forms of tobacco/nicotine, if applicable. Regular preventive services are another way to keep healthy. Preventive services (vaccines, screening tests, monitoring & exams) can help personalize your care plan, which helps you manage your own care. Screening tests can find health problems at the earliest stages, when they are easiest to treat. [facility] follows the current, evidence-based guidelines published by the [facility] Sekou ([facility]) when recommending preventive services for our patients. Because we follow these guidelines, sometimes recommendations change over time as research supports it. (For example, a prostate screening blood test is no longer routinely recommended for men with no symptoms). Of course, you and your doctor may decide to screen more often for some diseases, based on your risk and co-morbidities (chronic disease you are already diagnosed with). Preventive services for you include:  - Medicare offers their members a free annual wellness visit, which is time for you and your primary care provider to discuss and plan for your preventive service needs. Take advantage of this benefit every year!  -All adults over age 72 should receive the recommended pneumonia vaccines. Current USPSTF guidelines recommend a series of two vaccines for the best pneumonia protection.   -All adults should have a flu vaccine yearly and tetanus vaccine every 10 years.  -All adults age 48 and older should receive the shingles vaccines (series of two vaccines).        -All adults age 38-68 who are overweight should have a diabetes screening test once every three years.   -Other screening tests & preventive services for persons with diabetes include: an eye exam to screen for diabetic retinopathy, a kidney function test, a foot exam, and stricter control over your cholesterol.   -Cardiovascular screening for adults with routine risk involves an electrocardiogram (ECG) at intervals determined by the provider.   -Colorectal cancer screening should be done for adults age 54-65 with no increased risk factors for colorectal cancer. There are a number of acceptable methods of screening for this type of cancer. Each test has its own benefits and drawbacks. Discuss with your provider what is most appropriate for you during your annual wellness visit. The different tests include: colonoscopy (considered the best screening method), a fecal occult blood test, a fecal DNA test, and sigmoidoscopy.  -All adults born between [facility] should be screened once for Hepatitis C.  -An Abdominal Aortic Aneurysm (AAA) Screening is recommended for men age 73-68 who has ever smoked in their lifetime.      Here is a list of your current Health Maintenance items (your personalized list of preventive services) with a due date:  Health Maintenance Due   Topic Date Due    Yearly Flu Vaccine (1) 09/01/2021    COVID-19 Vaccine (3 - Booster for Pfizer series) 09/22/2021

## 2022-01-14 NOTE — PROGRESS NOTES
1. Have you been to the ER, urgent care clinic since your last visit? Hospitalized since your last visit? No    2. Have you seen or consulted any other health care providers outside of the [address] [place] [address] since your last visit? Include any pap smears or colon screening.  No

## 2022-01-15 LAB
ALBUMIN SERPL-MCNC: 4 G/DL (ref 3.5–5)
ALBUMIN/GLOB SERPL: 1.3 {RATIO} (ref 1.1–2.2)
ALP SERPL-CCNC: 91 U/L (ref 45–117)
ALT SERPL-CCNC: 24 U/L (ref 12–78)
ANION GAP SERPL CALC-SCNC: 6 MMOL/L (ref 5–15)
AST SERPL-CCNC: 15 U/L (ref 15–37)
BILIRUB SERPL-MCNC: 0.9 MG/DL (ref 0.2–1)
BUN SERPL-MCNC: 14 MG/DL (ref 6–20)
BUN/CREAT SERPL: 16 (ref 12–20)
CALCIUM SERPL-MCNC: 9.4 MG/DL (ref 8.5–10.1)
CHLORIDE SERPL-SCNC: 106 MMOL/L (ref 97–108)
CHOLEST SERPL-MCNC: 108 MG/DL
CK SERPL-CCNC: 67 U/L (ref 39–308)
CO2 SERPL-SCNC: 28 MMOL/L (ref 21–32)
CREAT SERPL-MCNC: 0.9 MG/DL (ref 0.7–1.3)
GLOBULIN SER CALC-MCNC: 3.1 G/DL (ref 2–4)
GLUCOSE SERPL-MCNC: 87 MG/DL (ref 65–100)
HDLC SERPL-MCNC: 48 MG/DL
HDLC SERPL: 2.3 {RATIO} (ref 0–5)
LDLC SERPL CALC-MCNC: 42.6 MG/DL (ref 0–100)
POTASSIUM SERPL-SCNC: 4.3 MMOL/L (ref 3.5–5.1)
PROT SERPL-MCNC: 7.1 G/DL (ref 6.4–8.2)
SODIUM SERPL-SCNC: 140 MMOL/L (ref 136–145)
TRIGL SERPL-MCNC: 87 MG/DL (ref ?–150)
VLDLC SERPL CALC-MCNC: 17.4 MG/DL

## 2022-02-25 ENCOUNTER — OFFICE VISIT (OUTPATIENT)
Dept: INTERNAL MEDICINE CLINIC | Age: 87
End: 2022-02-25
Payer: MEDICARE

## 2022-02-25 VITALS
HEART RATE: 74 BPM | WEIGHT: 180 LBS | RESPIRATION RATE: 18 BRPM | HEIGHT: 67 IN | BODY MASS INDEX: 28.25 KG/M2 | OXYGEN SATURATION: 94 % | SYSTOLIC BLOOD PRESSURE: 127 MMHG | DIASTOLIC BLOOD PRESSURE: 77 MMHG

## 2022-02-25 DIAGNOSIS — I10 HYPERTENSION, UNSPECIFIED TYPE: ICD-10-CM

## 2022-02-25 DIAGNOSIS — M25.532 WRIST PAIN, ACUTE, LEFT: ICD-10-CM

## 2022-02-25 DIAGNOSIS — I67.9 CEREBROVASCULAR DISEASE: ICD-10-CM

## 2022-02-25 DIAGNOSIS — E78.00 PURE HYPERCHOLESTEROLEMIA: ICD-10-CM

## 2022-02-25 DIAGNOSIS — Z11.1 SCREENING-PULMONARY TB: Primary | ICD-10-CM

## 2022-02-25 DIAGNOSIS — F51.01 PRIMARY INSOMNIA: ICD-10-CM

## 2022-02-25 DIAGNOSIS — Z79.899 ON STATIN THERAPY: ICD-10-CM

## 2022-02-25 PROCEDURE — 1101F PT FALLS ASSESS-DOCD LE1/YR: CPT | Performed by: INTERNAL MEDICINE

## 2022-02-25 PROCEDURE — 99214 OFFICE O/P EST MOD 30 MIN: CPT | Performed by: INTERNAL MEDICINE

## 2022-02-25 PROCEDURE — G8419 CALC BMI OUT NRM PARAM NOF/U: HCPCS | Performed by: INTERNAL MEDICINE

## 2022-02-25 PROCEDURE — G8536 NO DOC ELDER MAL SCRN: HCPCS | Performed by: INTERNAL MEDICINE

## 2022-02-25 PROCEDURE — G8427 DOCREV CUR MEDS BY ELIG CLIN: HCPCS | Performed by: INTERNAL MEDICINE

## 2022-02-25 PROCEDURE — G8510 SCR DEP NEG, NO PLAN REQD: HCPCS | Performed by: INTERNAL MEDICINE

## 2022-02-25 RX ORDER — CLONAZEPAM 1 MG/1
TABLET ORAL
Qty: 30 TABLET | Refills: 2 | Status: SHIPPED | OUTPATIENT
Start: 2022-02-25

## 2022-02-25 NOTE — PROGRESS NOTES
1. Have you been to the ER, urgent care clinic since your last visit? Hospitalized since your last visit? No    2. Have you seen or consulted any other health care providers outside of the [address] [place] [address] since your last visit? Include any pap smears or colon screening.  No

## 2022-02-25 NOTE — PROGRESS NOTES
Marquita Pearce is a 80 y.o. male and presents with Form Completion  . Subjective:    Mr. 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 He continues to take care of his wife who is generally healthy other than loss of lower extremity strength and inability to walk. She is completely dependent on him for ADLs. He notes this has been tough. He has lost little bit of weight. His appetite is good and he is eating well. He has no shortness of breath, chest pain, palpitations, PND, orthopnea, or pedal edema. He has wife are planning on going to assisted living where he will have some help taking care of her.   Past Medical History:   Diagnosis Date    Allergic rhinitis 11/1/2017    Impression: trial of otc Zyrtec    Arthralgia 11/1/2017    Impression: left hip, possibly referred from spine    Arthritis 11/1/2017    Back pain 11/1/2017    Chest pain 11/1/2017    Impression: left shoulder/arm pain ?anginal equivalent    Cough 11/1/2017    Impression: suspect medication related, will follow    Fatigue 11/1/2017    High cholesterol     HTN (hypertension) 11/1/2017    Impression: stable on HCTZ    Hypertension     intermittent    Hypertriglyceridemia 11/1/2017    Insomnia 11/1/2017    LBBB (left bundle branch block)     Nasal polyp 11/1/2017    On statin therapy 10/23/2018    Prostate cancer screening 11/1/2017    Rash 11/1/2017    Stroke ([facility] [address]     TIA    Wrist pain, acute, left 11/1/2017    Impression: mostly resolved, pt concerned this was heart related, more likely musculoskeletal or neuropathic, observe, if increases/persists follow up     Past Surgical History:   Procedure Laterality Date    COLONOSCOPY N/A 10/9/2018    COLONOSCOPY performed by Carroll Young MD at \[facility]\"" ENDOSCOPY    HX CIRCUMCISION  2013    HX MOHS PROCEDURES Left     NEUROLOGICAL PROCEDURE UNLISTED      \"pinched nerve neck\"     Allergies   Allergen Reactions    Hydrocodone Other (comments)     jittery    Lisinopril Cough     Current Outpatient Medications   Medication Sig Dispense Refill    clonazePAM (KlonoPIN) 1 mg tablet TAKE ONE TABLET BY MOUTH EVERY NIGHT AT BEDTIME AS NEEDED FOR ANXIETY 30 Tablet 2    sertraline (ZOLOFT) 50 mg tablet TAKE ONE TABLET BY MOUTH DAILY 30 Tablet 4    atorvastatin (LIPITOR) 40 mg tablet TAKE ONE TABLET BY MOUTH DAILY 90 Tablet 1     Social History     Socioeconomic History    Marital status:    Tobacco Use    Smoking status: Former Smoker     Packs/day: 1.00     Years: 3.00     Pack years: 3.00     Quit date:      Years since quittin.1    Smokeless tobacco: Never Used   Vaping Use    Vaping Use: Never used   Substance and Sexual Activity    Alcohol use: Yes     Alcohol/week: 7.0 standard drinks     Types: 7 Cans of beer per week    Drug use: No     Family History   Problem Relation Age of Onset    Dementia Mother     Heart Disease Father        Review of Systems  Constitutional:  negative for fevers, chills, anorexia and weight loss  Eyes:    negative for visual disturbance and irritation  ENT:    negative for tinnitus,sore throat,nasal congestion,ear pains. hoarseness  Respiratory:     negative for cough, hemoptysis, dyspnea,wheezing  CV:    negative for chest pain, palpitations, lower extremity edema  GI:    negative for nausea, vomiting, diarrhea, abdominal pain,melena  Endo:               negative for polyuria,polydipsia,polyphagia,heat intolerance  Genitourinary : negative for frequency, dysuria and hematuria  Integumentary: negative for rash and pruritus  Hematologic:   negative for easy bruising and gum/nose bleeding  Musculoskel:  negative for myalgias, arthralgias, back pain, muscle weakness, joint pain  Neurological:   negative for headaches, dizziness, vertigo, memory problems and gait   Behavl/Psych: negative for feelings of anxiety, depression, mood changes  ROS otherwise negative      Objective:  Visit Vitals  /77 (BP 1 Location: Right arm, BP Patient Position: Sitting, BP Cuff Size: Large adult)   Pulse 74   Resp 18   Ht 5' 7\" (1.702 m)   Wt 180 lb (81.6 kg)   SpO2 94%   BMI 28.19 kg/m²     Physical Exam:   General appearance - alert, well appearing, and in no distress  Mental status - alert, oriented to person, place, and time  EYE-KESHIA, EOMI, fundi normal, corneas normal, no foreign bodies  ENT-ENT exam normal, no neck nodes or sinus tenderness  Nose - normal and patent, no erythema, discharge or polyps  Mouth - mucous membranes moist, pharynx normal without lesions  Neck - supple, no significant adenopathy   Chest - clear to auscultation, no wheezes, rales or rhonchi, symmetric air entry   Heart - normal rate, regular rhythm, normal S1, S2, no murmurs, rubs, clicks or gallops   Abdomen - soft, nontender, nondistended, no masses or organomegaly  Lymph- no adenopathy palpable  Ext-peripheral pulses normal, no pedal edema, no clubbing or cyanosis  Skin-Warm and dry. no hyperpigmentation, vitiligo, or suspicious lesions  Neuro -alert, oriented, normal speech, no focal findings or movement disorder noted      Assessment/Plan:  Diagnoses and all orders for this visit:    1. Screening-pulmonary TB  -     XR CHEST PA LAT; Future          ICD-10-CM ICD-9-CM    1. Screening-pulmonary TB  Z11.1 V74.1 XR CHEST PA LAT             I have reviewed with the patient details of the assessment and plan and all questions were answered. Relevent patient education was performed. Verbal and/or written instructions (see AVS) provided. The most recent lab findings were reviewed with the patient. Plan was discussed with patient who verbally expressed understanding. An After Visit Summary was printed and given to the patient.     Charlene Boogie MD

## 2022-03-18 PROBLEM — I10 HTN (HYPERTENSION): Status: ACTIVE | Noted: 2017-11-01

## 2022-03-18 PROBLEM — G45.9 TIA (TRANSIENT ISCHEMIC ATTACK): Status: ACTIVE | Noted: 2017-11-27

## 2022-03-18 PROBLEM — E78.00 PURE HYPERCHOLESTEROLEMIA: Status: ACTIVE | Noted: 2018-10-23

## 2022-03-18 PROBLEM — M54.9 BACK PAIN: Status: ACTIVE | Noted: 2017-11-01

## 2022-03-18 PROBLEM — M25.50 ARTHRALGIA: Status: ACTIVE | Noted: 2017-11-01

## 2022-03-19 PROBLEM — I69.322 DYSARTHRIA DUE TO RECENT CEREBROVASCULAR ACCIDENT (CVA): Status: ACTIVE | Noted: 2017-11-28

## 2022-03-19 PROBLEM — I67.9 CEREBROVASCULAR DISEASE: Status: ACTIVE | Noted: 2018-04-10

## 2022-03-19 PROBLEM — R53.83 FATIGUE: Status: ACTIVE | Noted: 2017-11-01

## 2022-03-19 PROBLEM — R05.9 COUGH: Status: ACTIVE | Noted: 2017-11-01

## 2022-03-19 PROBLEM — M10.9 ACUTE GOUT OF RIGHT FOOT: Status: ACTIVE | Noted: 2017-11-01

## 2022-03-19 PROBLEM — E78.1 HYPERTRIGLYCERIDEMIA: Status: ACTIVE | Noted: 2017-11-01

## 2022-03-19 PROBLEM — J30.9 ALLERGIC RHINITIS: Status: ACTIVE | Noted: 2017-11-01

## 2022-03-19 PROBLEM — R07.9 CHEST PAIN: Status: ACTIVE | Noted: 2017-11-01

## 2022-03-19 PROBLEM — M19.90 ARTHRITIS: Status: ACTIVE | Noted: 2017-11-01

## 2022-03-19 PROBLEM — J33.9 NASAL POLYP: Status: ACTIVE | Noted: 2017-11-01

## 2022-03-19 PROBLEM — M25.532 WRIST PAIN, ACUTE, LEFT: Status: ACTIVE | Noted: 2017-11-01

## 2022-03-19 PROBLEM — G47.00 INSOMNIA: Status: ACTIVE | Noted: 2017-11-01

## 2022-03-19 PROBLEM — R21 RASH: Status: ACTIVE | Noted: 2017-11-01

## 2022-03-19 PROBLEM — Z79.899 ON STATIN THERAPY: Status: ACTIVE | Noted: 2018-10-23

## 2022-03-20 PROBLEM — I65.23 BILATERAL CAROTID ARTERY STENOSIS: Status: ACTIVE | Noted: 2017-11-28

## 2023-08-17 ENCOUNTER — HOSPITAL ENCOUNTER (EMERGENCY)
Facility: HOSPITAL | Age: 88
Discharge: HOME | End: 2023-08-18
Attending: EMERGENCY MEDICINE
Payer: MEDICARE

## 2023-08-17 ENCOUNTER — APPOINTMENT (OUTPATIENT)
Facility: HOSPITAL | Age: 88
End: 2023-08-17
Payer: MEDICARE

## 2023-08-17 VITALS
TEMPERATURE: 97.9 F | SYSTOLIC BLOOD PRESSURE: 116 MMHG | DIASTOLIC BLOOD PRESSURE: 66 MMHG | HEART RATE: 81 BPM | BODY MASS INDEX: 30.36 KG/M2 | HEIGHT: 69 IN | OXYGEN SATURATION: 95 % | RESPIRATION RATE: 18 BRPM | WEIGHT: 205 LBS

## 2023-08-17 DIAGNOSIS — J18.9 ATYPICAL PNEUMONIA: Primary | ICD-10-CM

## 2023-08-17 LAB
ALBUMIN SERPL-MCNC: 2.9 G/DL (ref 3.5–5)
ALBUMIN/GLOB SERPL: 0.6 (ref 1.1–2.2)
ALP SERPL-CCNC: 106 U/L (ref 45–117)
ALT SERPL-CCNC: 49 U/L (ref 12–78)
ANION GAP SERPL CALC-SCNC: 7 MMOL/L (ref 5–15)
APPEARANCE UR: CLEAR
AST SERPL-CCNC: 46 U/L (ref 15–37)
BACTERIA URNS QL MICRO: ABNORMAL /HPF
BASOPHILS # BLD: 0.1 K/UL (ref 0–0.1)
BASOPHILS NFR BLD: 1 % (ref 0–1)
BILIRUB SERPL-MCNC: 2 MG/DL (ref 0.2–1)
BILIRUB UR QL CFM: NEGATIVE
BUN SERPL-MCNC: 15 MG/DL (ref 6–20)
BUN/CREAT SERPL: 12 (ref 12–20)
CALCIUM SERPL-MCNC: 9.1 MG/DL (ref 8.5–10.1)
CHLORIDE SERPL-SCNC: 102 MMOL/L (ref 97–108)
CO2 SERPL-SCNC: 21 MMOL/L (ref 21–32)
COLOR UR: ABNORMAL
CREAT SERPL-MCNC: 1.27 MG/DL (ref 0.7–1.3)
DIFFERENTIAL METHOD BLD: ABNORMAL
EOSINOPHIL # BLD: 0 K/UL (ref 0–0.4)
EOSINOPHIL NFR BLD: 0 % (ref 0–7)
EPITH CASTS URNS QL MICRO: ABNORMAL /LPF
ERYTHROCYTE [DISTWIDTH] IN BLOOD BY AUTOMATED COUNT: 13.5 % (ref 11.5–14.5)
FLUAV AG NPH QL IA: NEGATIVE
FLUBV AG NOSE QL IA: NEGATIVE
GLOBULIN SER CALC-MCNC: 5.1 G/DL (ref 2–4)
GLUCOSE SERPL-MCNC: 120 MG/DL (ref 65–100)
GLUCOSE UR STRIP.AUTO-MCNC: NEGATIVE MG/DL
HCT VFR BLD AUTO: 43 % (ref 36.6–50.3)
HGB BLD-MCNC: 14.4 G/DL (ref 12.1–17)
HGB UR QL STRIP: ABNORMAL
IMM GRANULOCYTES # BLD AUTO: 0 K/UL (ref 0–0.04)
IMM GRANULOCYTES NFR BLD AUTO: 1 % (ref 0–0.5)
KETONES UR QL STRIP.AUTO: ABNORMAL MG/DL
LACTATE BLD-SCNC: 1.1 MMOL/L (ref 0.4–2)
LEUKOCYTE ESTERASE UR QL STRIP.AUTO: ABNORMAL
LYMPHOCYTES # BLD: 1.2 K/UL (ref 0.8–3.5)
LYMPHOCYTES NFR BLD: 14 % (ref 12–49)
MAGNESIUM SERPL-MCNC: 2.3 MG/DL (ref 1.6–2.4)
MCH RBC QN AUTO: 30.8 PG (ref 26–34)
MCHC RBC AUTO-ENTMCNC: 33.5 G/DL (ref 30–36.5)
MCV RBC AUTO: 92.1 FL (ref 80–99)
MONOCYTES # BLD: 1 K/UL (ref 0–1)
MONOCYTES NFR BLD: 12 % (ref 5–13)
NEUTS SEG # BLD: 6.2 K/UL (ref 1.8–8)
NEUTS SEG NFR BLD: 72 % (ref 32–75)
NITRITE UR QL STRIP.AUTO: POSITIVE
NRBC # BLD: 0 K/UL (ref 0–0.01)
NRBC BLD-RTO: 0 PER 100 WBC
PH UR STRIP: 6 (ref 5–8)
PLATELET # BLD AUTO: 298 K/UL (ref 150–400)
PMV BLD AUTO: 10.3 FL (ref 8.9–12.9)
POTASSIUM SERPL-SCNC: 3.9 MMOL/L (ref 3.5–5.1)
PROT SERPL-MCNC: 8 G/DL (ref 6.4–8.2)
PROT UR STRIP-MCNC: 100 MG/DL
RBC # BLD AUTO: 4.67 M/UL (ref 4.1–5.7)
RBC #/AREA URNS HPF: ABNORMAL /HPF (ref 0–5)
SARS-COV-2 RDRP RESP QL NAA+PROBE: NOT DETECTED
SODIUM SERPL-SCNC: 130 MMOL/L (ref 136–145)
SOURCE: NORMAL
SP GR UR REFRACTOMETRY: 1.02
URINE CULTURE IF INDICATED: ABNORMAL
UROBILINOGEN UR QL STRIP.AUTO: 4 EU/DL (ref 0.2–1)
WBC # BLD AUTO: 8.5 K/UL (ref 4.1–11.1)
WBC URNS QL MICRO: ABNORMAL /HPF (ref 0–4)

## 2023-08-17 PROCEDURE — 6370000000 HC RX 637 (ALT 250 FOR IP): Performed by: EMERGENCY MEDICINE

## 2023-08-17 PROCEDURE — 83735 ASSAY OF MAGNESIUM: CPT

## 2023-08-17 PROCEDURE — 83605 ASSAY OF LACTIC ACID: CPT

## 2023-08-17 PROCEDURE — 81001 URINALYSIS AUTO W/SCOPE: CPT

## 2023-08-17 PROCEDURE — 87635 SARS-COV-2 COVID-19 AMP PRB: CPT

## 2023-08-17 PROCEDURE — 85025 COMPLETE CBC W/AUTO DIFF WBC: CPT

## 2023-08-17 PROCEDURE — 99284 EMERGENCY DEPT VISIT MOD MDM: CPT

## 2023-08-17 PROCEDURE — 87040 BLOOD CULTURE FOR BACTERIA: CPT

## 2023-08-17 PROCEDURE — 71045 X-RAY EXAM CHEST 1 VIEW: CPT

## 2023-08-17 PROCEDURE — 36415 COLL VENOUS BLD VENIPUNCTURE: CPT

## 2023-08-17 PROCEDURE — 87804 INFLUENZA ASSAY W/OPTIC: CPT

## 2023-08-17 PROCEDURE — 80053 COMPREHEN METABOLIC PANEL: CPT

## 2023-08-17 RX ORDER — ACETAMINOPHEN 500 MG
1000 TABLET ORAL
Status: COMPLETED | OUTPATIENT
Start: 2023-08-17 | End: 2023-08-17

## 2023-08-17 RX ORDER — AZITHROMYCIN 250 MG/1
250 TABLET, FILM COATED ORAL SEE ADMIN INSTRUCTIONS
Qty: 6 TABLET | Refills: 0 | Status: SHIPPED | OUTPATIENT
Start: 2023-08-17 | End: 2023-08-22

## 2023-08-17 RX ADMIN — ACETAMINOPHEN 1000 MG: 500 TABLET ORAL at 19:43

## 2023-08-18 NOTE — ED NOTES
Patient ambulated by tech with pulse ox, patient maintained oxygen level of 95-96% while ambulating. MD made aware.       Maria C Hunter RN  08/17/23 [id number]

## 2023-08-18 NOTE — DISCHARGE INSTRUCTIONS
Please follow-up with your primary care doctor. Please take the antibiotics. Please return for new or worsening symptoms anytime.

## 2023-08-20 LAB
BACTERIA SPEC CULT: NORMAL
BACTERIA SPEC CULT: NORMAL
SERVICE CMNT-IMP: NORMAL
SERVICE CMNT-IMP: NORMAL

## 2023-08-23 LAB
BACTERIA SPEC CULT: NORMAL
BACTERIA SPEC CULT: NORMAL
SERVICE CMNT-IMP: NORMAL
SERVICE CMNT-IMP: NORMAL

## (undated) DEVICE — SOLIDIFIER MEDC 1200ML -- CONVERT TO 356117

## (undated) DEVICE — Device

## (undated) DEVICE — SET ADMIN 16ML TBNG L100IN 2 Y INJ SITE IV PIGGY BK DISP

## (undated) DEVICE — 1200 GUARD II KIT W/5MM TUBE W/O VAC TUBE: Brand: GUARDIAN

## (undated) DEVICE — NEONATAL-ADULT SPO2 SENSOR: Brand: NELLCOR

## (undated) DEVICE — Z DISCONTINUED PER MEDLINE LINE GAS SAMPLING O2/CO2 LNG AD 13 FT NSL W/ TBNG FILTERLINE

## (undated) DEVICE — SYR 3ML LL TIP 1/10ML GRAD --

## (undated) DEVICE — TOWEL 4 PLY TISS 19X30 SUE WHT

## (undated) DEVICE — BASIN EMSIS 16OZ GRAPHITE PLAS KID SHP MOLD GRAD FOR ORAL

## (undated) DEVICE — CATH IV AUTOGRD BC BLU 22GA 25 -- INSYTE

## (undated) DEVICE — KENDALL RADIOLUCENT FOAM MONITORING ELECTRODE RECTANGULAR SHAPE: Brand: KENDALL

## (undated) DEVICE — NEEDLE HYPO 18GA L1.5IN PNK S STL HUB POLYPR SHLD REG BVL

## (undated) DEVICE — SYR 10ML LUER LOK 1/5ML GRAD --